# Patient Record
Sex: FEMALE | Race: WHITE | Employment: OTHER | URBAN - METROPOLITAN AREA
[De-identification: names, ages, dates, MRNs, and addresses within clinical notes are randomized per-mention and may not be internally consistent; named-entity substitution may affect disease eponyms.]

---

## 2017-01-18 RX ORDER — ALPRAZOLAM 1 MG/1
TABLET ORAL
Qty: 60 TAB | Refills: 3 | OUTPATIENT
Start: 2017-01-18 | End: 2017-05-23 | Stop reason: SDUPTHER

## 2017-01-18 RX ORDER — TRAMADOL HYDROCHLORIDE 50 MG/1
TABLET ORAL
Qty: 30 TAB | Refills: 1 | OUTPATIENT
Start: 2017-01-18 | End: 2017-11-13 | Stop reason: SDUPTHER

## 2017-02-07 RX ORDER — LEVOTHYROXINE SODIUM 75 UG/1
TABLET ORAL
Qty: 30 TAB | Refills: 11 | Status: SHIPPED | OUTPATIENT
Start: 2017-02-07 | End: 2017-11-13

## 2017-05-12 ENCOUNTER — OFFICE VISIT (OUTPATIENT)
Dept: FAMILY MEDICINE CLINIC | Age: 67
End: 2017-05-12

## 2017-05-12 ENCOUNTER — HOSPITAL ENCOUNTER (OUTPATIENT)
Dept: MAMMOGRAPHY | Age: 67
Discharge: HOME | End: 2017-05-12
Attending: FAMILY MEDICINE
Payer: COMMERCIAL

## 2017-05-12 VITALS
WEIGHT: 119.4 LBS | RESPIRATION RATE: 16 BRPM | DIASTOLIC BLOOD PRESSURE: 80 MMHG | TEMPERATURE: 99 F | BODY MASS INDEX: 22.54 KG/M2 | SYSTOLIC BLOOD PRESSURE: 148 MMHG | HEIGHT: 61 IN | OXYGEN SATURATION: 97 % | HEART RATE: 75 BPM

## 2017-05-12 DIAGNOSIS — N30.00 ACUTE CYSTITIS WITHOUT HEMATURIA: ICD-10-CM

## 2017-05-12 DIAGNOSIS — E03.4 HYPOTHYROIDISM DUE TO ACQUIRED ATROPHY OF THYROID: ICD-10-CM

## 2017-05-12 DIAGNOSIS — Z13.1 SCREENING FOR DIABETES MELLITUS: ICD-10-CM

## 2017-05-12 DIAGNOSIS — Z91.09 ENVIRONMENTAL ALLERGIES: ICD-10-CM

## 2017-05-12 DIAGNOSIS — F41.9 ANXIETY: ICD-10-CM

## 2017-05-12 DIAGNOSIS — Z51.81 ENCOUNTER FOR MEDICATION MONITORING: ICD-10-CM

## 2017-05-12 DIAGNOSIS — Z12.31 VISIT FOR SCREENING MAMMOGRAM: ICD-10-CM

## 2017-05-12 DIAGNOSIS — Z00.00 ROUTINE GENERAL MEDICAL EXAMINATION AT A HEALTH CARE FACILITY: Primary | ICD-10-CM

## 2017-05-12 LAB
BILIRUB UR QL STRIP: NEGATIVE
GLUCOSE UR-MCNC: NEGATIVE MG/DL
HBA1C MFR BLD HPLC: 5.1 %
HEMOCCULT STL QL: NEGATIVE
KETONES P FAST UR STRIP-MCNC: NORMAL MG/DL
PH UR STRIP: 5.5 [PH] (ref 4.6–8)
PROT UR QL STRIP: NORMAL MG/DL
SP GR UR STRIP: 1.02 (ref 1–1.03)
UA UROBILINOGEN AMB POC: NORMAL (ref 0.2–1)
URINALYSIS CLARITY POC: NORMAL
URINALYSIS COLOR POC: YELLOW
URINE BLOOD POC: NORMAL
URINE LEUKOCYTES POC: NORMAL
URINE NITRITES POC: POSITIVE
VALID INTERNAL CONTROL?: YES

## 2017-05-12 PROCEDURE — 77067 SCR MAMMO BI INCL CAD: CPT

## 2017-05-12 RX ORDER — SULFAMETHOXAZOLE AND TRIMETHOPRIM 800; 160 MG/1; MG/1
1 TABLET ORAL 2 TIMES DAILY
Qty: 10 TAB | Refills: 0 | Status: SHIPPED | OUTPATIENT
Start: 2017-05-12 | End: 2017-05-17

## 2017-05-12 NOTE — PROGRESS NOTES
Subjective:   79 y.o. female for Well Woman Check. Patient's last menstrual period was 01/01/1983. Hypercholesterolemia follow up:  Compliant w/ low fat, low cholesterol diet. Exercising some. Patient fasting today. Thyroid Review:  Patient is seen for followup of hypothyroidism. Thyroid ROS: denies fatigue, heat/cold intolerance, bowel/skin changes or CVS symptoms. Weight is up from last visit. Wt Readings from Last 3 Encounters:   05/12/17 119 lb 6.4 oz (54.2 kg)   06/06/16 114 lb (51.7 kg)   12/11/15 104 lb 6.4 oz (47.4 kg)     Patient Active Problem List   Diagnosis Code    Menopause Z78.0    Hypothyroidism E03.9    Anxiety F41.9    GERD (gastroesophageal reflux disease) K21.9    Lung nodule R91.1    Degenerative arthritis of thumb M19.049       Current Outpatient Prescriptions   Medication Sig Dispense Refill    calcium-cholecalciferol, d3, (CALCIUM 600 + D) 600-125 mg-unit tab Take 1 Tab by mouth daily.  Vit A,C,E-Zinc-Copper (ICAPS AREDS) cap capsule Take 1 Cap by mouth.  Cetirizine (ZYRTEC) 10 mg cap Take 1 Tab by mouth daily.  trimethoprim-sulfamethoxazole (BACTRIM DS, SEPTRA DS) 160-800 mg per tablet Take 1 Tab by mouth two (2) times a day for 5 days. 10 Tab 0    levothyroxine (SYNTHROID) 75 mcg tablet TAKE ONE TABLET BY MOUTH ONE TIME DAILY 30 Tab 11    traMADol (ULTRAM) 50 mg tablet Take 1 tablet by mouth every 6 hours as needed for pain. (Max 4 tablets per day) 30 Tab 1    ALPRAZolam (XANAX) 1 mg tablet TAKE ONE OR TWO TABLETS BY MOUTH TWICE DAILY AS NEEDED for anxiety 60 Tab 3    promethazine-dextromethorphan (PROMETHAZINE-DM) 6.25-15 mg/5 mL syrup Take 5 ml by mouth every 6 hours as needed for cough 240 mL 1    omeprazole (PRILOSEC) 20 mg capsule TAKE ONE CAPSULE BY MOUTH ONE TIME DAILY 90 Cap 3    black cohosh 540 mg cap Take 1 Tab by mouth daily.       fluticasone (FLONASE) 50 mcg/actuation nasal spray USE TWO SPRAYS IN EACH NOSTRIL DAILY  16 g 10 Allergies   Allergen Reactions    Codeine Nausea and Vomiting    Mepivacaine Other (comments)     Mepivacaine 3%   Facial swelling    Novocain [Procaine] Swelling     Facial swelling       Past Medical History:   Diagnosis Date    Anxiety 4/15/2010    Environmental allergies     GERD (gastroesophageal reflux disease)     Hypothyroidism 4/15/2010    Lung nodule     Menopause 4/15/2010    Pneumonia     Thyroid disease        Past Surgical History:   Procedure Laterality Date    HX APPENDECTOMY      HX HYSTERECTOMY  1983    MT COLONOSCOPY FLX DX W/COLLJ SPEC WHEN PFRMD  8/4/2010    nawaf       Family History   Problem Relation Age of Onset    Lung Disease Mother      emphysema    Hypertension Mother     Cancer Father      colon     Alzheimer Brother        Social History   Substance Use Topics    Smoking status: Former Smoker     Quit date: 1/1/2000    Smokeless tobacco: Never Used    Alcohol use 0.0 oz/week     0 Standard drinks or equivalent per week      Comment: occasional          ROS:  Feeling well. No dyspnea or chest pain on exertion. No abdominal pain, change in bowel habits, black or bloody stools. No urinary tract symptoms. GYN ROS: no breast pain or new or enlarging lumps on self exam, no discharge or pelvic pain, no hot flashes. No neurological complaints. Objective:     Visit Vitals    /80    Pulse 75    Temp 99 °F (37.2 °C) (Oral)    Resp 16    Ht 5' 1\" (1.549 m)    Wt 119 lb 6.4 oz (54.2 kg)    LMP 01/01/1983    SpO2 97%    BMI 22.56 kg/m2     The patient appears well, alert, oriented x 3, in no distress. ENT normal.  Neck supple. No adenopathy or thyromegaly. WILBERT. Lungs are clear, good air entry, no wheezes, rhonchi or rales. S1 and S2 normal, no murmurs, regular rate and rhythm. Abdomen soft without tenderness, guarding, mass or organomegaly. Extremities show no edema, normal peripheral pulses. Neurological is normal, no focal findings.     BREAST EXAM: breasts appear normal, no suspicious masses, no skin or nipple changes or axillary nodes    PELVIC EXAM: RECTAL: normal rectal, no masses, guaiac negative stool obtained    Assessment/Plan:   well woman  mammogram  counseled on breast self exam, mammography screening, osteoporosis and adequate intake of calcium and vitamin D  additional lab tests per orders  Emma Phillips was seen today for complete physical.    Diagnoses and all orders for this visit:    Routine general medical examination at a health care facility  -     LIPID PANEL  -     CBC W/O DIFF  -     AMB POC URINALYSIS DIP STICK AUTO W/ MICRO  -     AMB POC EKG ROUTINE W/ 12 LEADS, INTER & REP  -   Nonspecific ST changes non diagnostic. Asymptomatic for cardiac sx. -     AMB POC FECAL BLOOD, OCCULT, QL 1 CARD    Hypothyroidism due to acquired atrophy of thyroid  -     TSH 3RD GENERATION    Anxiety  Stable     Environmental allergies  Stable with OTC medication    Encounter for medication monitoring  -     METABOLIC PANEL, COMPREHENSIVE    Screening for diabetes mellitus  -     AMB POC HEMOGLOBIN A1C    Acute cystitis without hematuria  UA with 4+ bacteria. Does admit to urinary discomfort with passing her urine. Has a pulling sensation with urination. -     trimethoprim-sulfamethoxazole (BACTRIM DS, SEPTRA DS) 160-800 mg per tablet; Take 1 Tab by mouth two (2) times a day for 5 days. -     CULTURE, URINE        Follow-up Disposition:  Return in about 6 months (around 11/12/2017). reviewed diet, exercise and weight control  cardiovascular risk and specific lipid/LDL goals reviewed  reviewed medications and side effects in detail    I have discussed diagnosis listed in this note with pt and/or family. I have discussed treatment plans and options and the risk/benefit analysis of those options, including safe use of medications and possible medication side effects. Through the use of shared decision making we have agreed to the above plan.  The patient has received an after-visit summary and questions were answered concerning future plans and follow up. Advise pt of any urgent changes then to proceed to the ER.

## 2017-05-12 NOTE — PROGRESS NOTES
Patient is here for her yearly physical. She does not yet have Medicare.     CMP 12/11/2015    BMP 8/10/15    TSH/T4 6/6/16    Lipid 8/10/2015    Mammogram: 8/10/2015---appt today    Colonoscopy 3/12/2015 by Dr. Anna Query in 5 yrs

## 2017-05-12 NOTE — MR AVS SNAPSHOT
Visit Information Date & Time Provider Department Dept. Phone Encounter #  
 5/12/2017 10:15 AM Wilfredo Swenson MD [facility] [phone] [id number] Follow-up Instructions Return in about 6 months (around 11/12/2017). Your Appointments 11/13/2017  9:15 AM  
COMPLETE PHYSICAL with Wilfredo Swenson MD  
[facility] [address]) Appt Note: [facility]  
 [address] [place] [id number] [phone] [address] [facility] [address] Upcoming Health Maintenance Date Due ZOSTER VACCINE AGE 60> 1/28/2010 BREAST CANCER SCRN MAMMOGRAM 8/10/2017 INFLUENZA AGE 9 TO ADULT 8/1/2017 Pneumococcal 65+ Low/Medium Risk (2 of 2 - PPSV23) 12/11/2017 MEDICARE YEARLY EXAM 5/13/2018 GLAUCOMA SCREENING Q2Y 3/31/2019 COLONOSCOPY 3/12/2020 DTaP/Tdap/Td series (2 - Td) 4/19/2020 Allergies as of 5/12/2017  Review Complete On: 5/12/2017 By: Wilfredo Swenson MD  
  
 Severity Noted Reaction Type Reactions Codeine  10/07/2012    Nausea and Vomiting Mepivacaine  04/01/2010    Other (comments) Mepivacaine 3% Facial swelling Novocain [Procaine]  10/18/2010    Swelling Facial swelling Current Immunizations  Reviewed on 5/12/2017 Name Date Influenza Vaccine 9/15/2015, 12/11/2012 Influenza Vaccine PF 10/18/2013 Influenza Vaccine Split 10/21/2011, 10/18/2010 Pneumococcal Conjugate (PCV-13) 8/10/2015 Pneumococcal Polysaccharide (PPSV-23) 12/11/2012 TDAP Vaccine 4/19/2010 Reviewed by Wilfredo Swenson MD on 5/12/2017 at 11:05 AM  
You Were Diagnosed With   
  
 Codes Comments Routine general medical examination at a health care facility    -  Primary ICD-10-CM: Z00.00 ICD-9-CM: V70.0 Hypothyroidism due to acquired atrophy of thyroid     ICD-10-CM: E03.4 ICD-9-CM: 244.8, 246.8 Anxiety     ICD-10-CM: F41.9 ICD-9-CM: 300.00 Environmental allergies     ICD-10-CM: Z91.09 
ICD-9-CM: V15.09 Encounter for medication monitoring     ICD-10-CM: Z51.81 
ICD-9-CM: V58.83 Screening for diabetes mellitus     ICD-10-CM: Z13.1 ICD-9-CM: V77.1 Acute cystitis without hematuria     ICD-10-CM: N30.00 ICD-9-CM: 595.0 Vitals BP Pulse Temp Resp Height(growth percentile) Weight(growth percentile) 148/80 75 99 °F (37.2 °C) (Oral) 16 5' 1\" (1.549 m) 119 lb 6.4 oz (54.2 kg) LMP SpO2 BMI OB Status Smoking Status 01/01/1983 97% 22.56 kg/m2 Hysterectomy Former Smoker Vitals History BMI and BSA Data Body Mass Index Body Surface Area  
 22.56 kg/m 2 1.53 m 2 Preferred Pharmacy Pharmacy Name Phone [place] [id number] [place] [facility] [place] [phone] Your Updated Medication List  
  
   
This list is accurate as of: 5/12/17  5:06 PM.  Always use your most recent med list.  
  
  
  
  
 ALPRAZolam 1 mg tablet Commonly known as:  XANAX  
TAKE ONE OR TWO TABLETS BY MOUTH TWICE DAILY AS NEEDED for anxiety  
  
 black cohosh 540 mg Cap Take 1 Tab by mouth daily. CALCIUM 600 + D 600-125 mg-unit Tab Generic drug:  calcium-cholecalciferol (d3) Take 1 Tab by mouth daily. fluticasone 50 mcg/actuation nasal spray Commonly known as:  FLONASE  
USE TWO SPRAYS IN EACH NOSTRIL DAILY ICAPS AREDS Cap capsule Generic drug:  Vit A,C,E-Zinc-Copper Take 1 Cap by mouth.  
  
 levothyroxine 75 mcg tablet Commonly known as:  SYNTHROID  
TAKE ONE TABLET BY MOUTH ONE TIME DAILY  
  
 omeprazole 20 mg capsule Commonly known as:  PRILOSEC  
TAKE ONE CAPSULE BY MOUTH ONE TIME DAILY promethazine-dextromethorphan 6.25-15 mg/5 mL syrup Commonly known as:  PROMETHAZINE-DM Take 5 ml by mouth every 6 hours as needed for cough  
  
 traMADol 50 mg tablet Commonly known as:  Jose Burris  
 Take 1 tablet by mouth every 6 hours as needed for pain. (Max 4 tablets per day)  
  
 trimethoprim-sulfamethoxazole 160-800 mg per tablet Commonly known as:  BACTRIM DS, SEPTRA DS Take 1 Tab by mouth two (2) times a day for 5 days. ZyrTEC 10 mg Cap Generic drug:  Cetirizine Take 1 Tab by mouth daily. Prescriptions Sent to Pharmacy Refills  
 trimethoprim-sulfamethoxazole (BACTRIM DS, SEPTRA DS) 160-800 mg per tablet 0 Sig: Take 1 Tab by mouth two (2) times a day for 5 days. Class: Normal  
 Pharmacy: [facility] [id number] [place] [facility]: [phone] Route: Oral  
  
We Performed the Following AMB POC EKG ROUTINE W/ 12 LEADS, INTER & REP [71709 CPT(R)] AMB POC FECAL BLOOD, OCCULT, QL 1 CARD [81772 CPT(R)] AMB POC HEMOGLOBIN A1C [12750 CPT(R)] AMB POC URINALYSIS DIP STICK AUTO W/ MICRO [10615 CPT(R)] CBC W/O DIFF [44979 CPT(R)] CULTURE, URINE Q7618723 CPT(R)] LIPID PANEL [57689 CPT(R)] METABOLIC PANEL, COMPREHENSIVE [16358 CPT(R)] TSH 3RD GENERATION [12292 CPT(R)] Follow-up Instructions Return in about 6 months (around 11/12/2017). Introducing [facility]! Harshal Garcia introduces [employer] patient portal. Now you can access parts of your medical record, email your doctor's office, and request medication refills online. 1. In your internet browser, go to [email or web address] 2. Click on the First Time User? Click Here link in the Sign In box. You will see the New Member Sign Up page. 3. Enter your [employer] Access Code exactly as it appears below. You will not need to use this code after youve completed the sign-up process. If you do not sign up before the expiration date, you must request a new code. · [employer] Access Code: [email or web address] Expires: 8/10/2017 11:48 AM 
 
4.  Enter the last four digits of your Social Security Number (xxxx) and Date of Birth (mm/dd/yyyy) as indicated and click Submit. You will be taken to the next sign-up page. 5. Create a Medical Envelope ID. This will be your Medical Envelope login ID and cannot be changed, so think of one that is secure and easy to remember. 6. Create a Medical Envelope password. You can change your password at any time. 7. Enter your Password Reset Question and Answer. This can be used at a later time if you forget your password. 8. Enter your e-mail address. You will receive e-mail notification when new information is available in [address] 9. Click Sign Up. You can now view and download portions of your medical record. 10. Click the Download Summary menu link to download a portable copy of your medical information. If you have questions, please visit the Frequently Asked Questions section of the [employer] website. Remember, [employer] is NOT to be used for urgent needs. For medical emergencies, dial 911. Now available from your iPhone and Android! Please provide this summary of care documentation to your next provider. Your primary care clinician is listed as Dinah Berrios. If you have any questions after today's visit, please call [phone].

## 2017-05-13 LAB
ALBUMIN SERPL-MCNC: 4.7 G/DL (ref 3.6–4.8)
ALBUMIN/GLOB SERPL: 2.2 {RATIO} (ref 1.2–2.2)
ALP SERPL-CCNC: 79 IU/L (ref 39–117)
ALT SERPL-CCNC: 9 IU/L (ref 0–32)
AST SERPL-CCNC: 12 IU/L (ref 0–40)
BILIRUB SERPL-MCNC: 0.7 MG/DL (ref 0–1.2)
BUN SERPL-MCNC: 12 MG/DL (ref 8–27)
BUN/CREAT SERPL: 16 (ref 12–28)
CALCIUM SERPL-MCNC: 9.6 MG/DL (ref 8.7–10.3)
CHLORIDE SERPL-SCNC: 96 MMOL/L (ref 96–106)
CHOLEST SERPL-MCNC: 198 MG/DL (ref 100–199)
CO2 SERPL-SCNC: 23 MMOL/L (ref 18–29)
CREAT SERPL-MCNC: 0.75 MG/DL (ref 0.57–1)
ERYTHROCYTE [DISTWIDTH] IN BLOOD BY AUTOMATED COUNT: 13.6 % (ref 12.3–15.4)
GLOBULIN SER CALC-MCNC: 2.1 G/DL (ref 1.5–4.5)
GLUCOSE SERPL-MCNC: 89 MG/DL (ref 65–99)
HCT VFR BLD AUTO: 39.2 % (ref 34–46.6)
HDLC SERPL-MCNC: 85 MG/DL
HGB BLD-MCNC: 13.4 G/DL (ref 11.1–15.9)
INTERPRETATION, 910389: NORMAL
LDLC SERPL CALC-MCNC: 101 MG/DL (ref 0–99)
MCH RBC QN AUTO: 29.8 PG (ref 26.6–33)
MCHC RBC AUTO-ENTMCNC: 34.2 G/DL (ref 31.5–35.7)
MCV RBC AUTO: 87 FL (ref 79–97)
PLATELET # BLD AUTO: 298 X10E3/UL (ref 150–379)
POTASSIUM SERPL-SCNC: 4.7 MMOL/L (ref 3.5–5.2)
PROT SERPL-MCNC: 6.8 G/DL (ref 6–8.5)
RBC # BLD AUTO: 4.49 X10E6/UL (ref 3.77–5.28)
SODIUM SERPL-SCNC: 139 MMOL/L (ref 134–144)
TRIGL SERPL-MCNC: 59 MG/DL (ref 0–149)
TSH SERPL DL<=0.005 MIU/L-ACNC: 0.28 UIU/ML (ref 0.45–4.5)
VLDLC SERPL CALC-MCNC: 12 MG/DL (ref 5–40)
WBC # BLD AUTO: 11.1 X10E3/UL (ref 3.4–10.8)

## 2017-05-15 LAB — BACTERIA UR CULT: ABNORMAL

## 2017-05-16 RX ORDER — OMEPRAZOLE 20 MG/1
CAPSULE, DELAYED RELEASE ORAL
Qty: 90 CAP | Refills: 3 | Status: SHIPPED | OUTPATIENT
Start: 2017-05-16 | End: 2018-05-18 | Stop reason: SDUPTHER

## 2017-05-23 RX ORDER — ALPRAZOLAM 1 MG/1
TABLET ORAL
Qty: 60 TAB | Refills: 3 | OUTPATIENT
Start: 2017-05-23 | End: 2017-09-15 | Stop reason: SDUPTHER

## 2017-07-05 ENCOUNTER — LAB ONLY (OUTPATIENT)
Dept: FAMILY MEDICINE CLINIC | Age: 67
End: 2017-07-05

## 2017-07-05 DIAGNOSIS — E03.4 HYPOTHYROIDISM DUE TO ACQUIRED ATROPHY OF THYROID: Primary | ICD-10-CM

## 2017-07-06 LAB — TSH SERPL DL<=0.005 MIU/L-ACNC: 3.78 UIU/ML (ref 0.45–4.5)

## 2017-08-23 RX ORDER — TRAMADOL HYDROCHLORIDE 50 MG/1
TABLET ORAL
Qty: 30 TAB | Refills: 1 | OUTPATIENT
Start: 2017-08-23

## 2017-08-23 NOTE — TELEPHONE ENCOUNTER
Called pt LM that an office visit is required to get refill on Tramadol. Appt has been scheduled for 9/1/2017 at 2:45pm. If unable to make appt please call back.

## 2017-08-24 ENCOUNTER — TELEPHONE (OUTPATIENT)
Dept: FAMILY MEDICINE CLINIC | Age: 67
End: 2017-08-24

## 2017-08-24 NOTE — TELEPHONE ENCOUNTER
Patient wants to let you know she cancelled her appt for 09/01/2017.   If any questions please give her a call @ [phone]

## 2017-09-15 RX ORDER — ALPRAZOLAM 1 MG/1
TABLET ORAL
Qty: 60 TAB | Refills: 3 | Status: SHIPPED | OUTPATIENT
Start: 2017-09-15 | End: 2018-01-10 | Stop reason: SDUPTHER

## 2017-11-13 ENCOUNTER — OFFICE VISIT (OUTPATIENT)
Dept: FAMILY MEDICINE CLINIC | Age: 67
End: 2017-11-13

## 2017-11-13 VITALS
HEIGHT: 61 IN | BODY MASS INDEX: 22.51 KG/M2 | TEMPERATURE: 95.8 F | SYSTOLIC BLOOD PRESSURE: 140 MMHG | OXYGEN SATURATION: 95 % | HEART RATE: 61 BPM | RESPIRATION RATE: 14 BRPM | WEIGHT: 119.2 LBS | DIASTOLIC BLOOD PRESSURE: 84 MMHG

## 2017-11-13 DIAGNOSIS — F41.9 ANXIETY: ICD-10-CM

## 2017-11-13 DIAGNOSIS — M18.11 OSTEOARTHRITIS OF RIGHT THUMB: ICD-10-CM

## 2017-11-13 DIAGNOSIS — E03.4 HYPOTHYROIDISM DUE TO ACQUIRED ATROPHY OF THYROID: Primary | ICD-10-CM

## 2017-11-13 DIAGNOSIS — G89.29 ENCOUNTER FOR CHRONIC PAIN MANAGEMENT: ICD-10-CM

## 2017-11-13 RX ORDER — TRAMADOL HYDROCHLORIDE 50 MG/1
TABLET ORAL
Qty: 30 TAB | Refills: 0 | Status: SHIPPED | OUTPATIENT
Start: 2017-11-13 | End: 2018-05-14 | Stop reason: SDUPTHER

## 2017-11-13 RX ORDER — NAPROXEN 500 MG/1
500 TABLET ORAL
Qty: 30 TAB | Refills: 1 | Status: SHIPPED | OUTPATIENT
Start: 2017-11-13 | End: 2018-01-02 | Stop reason: SDUPTHER

## 2017-11-13 RX ORDER — LEVOTHYROXINE SODIUM 75 UG/1
TABLET ORAL
COMMUNITY
End: 2018-03-06 | Stop reason: SDUPTHER

## 2017-11-13 NOTE — MR AVS SNAPSHOT
Visit Information Date & Time Provider Department Dept. Phone Encounter #  
 11/13/2017  9:15 AM Eve Dial MD [facility] [phone] [id number] Follow-up Instructions Return in about 6 months (around 5/13/2018) for physical.  
  
Your Appointments 12/1/2017  8:30 AM  
ROUTINE CARE with Eve Dial MD  
[facility] [address]) Appt Note: f/u  jacob clay scheduled $25cp lsp 8/4/17  
 [address] [address] [phone] [address] [facility] [address] Upcoming Health Maintenance Date Due ZOSTER VACCINE AGE 60> 11/28/2009 Pneumococcal 65+ Low/Medium Risk (2 of 2 - PPSV23) 12/11/2017 MEDICARE YEARLY EXAM 5/13/2018 GLAUCOMA SCREENING Q2Y 3/31/2019 BREAST CANCER SCRN MAMMOGRAM 5/12/2019 COLONOSCOPY 3/12/2020 DTaP/Tdap/Td series (2 - Td) 4/19/2020 Allergies as of 11/13/2017  Review Complete On: 11/13/2017 By: Susan Skaggs LPN Severity Noted Reaction Type Reactions Codeine  10/07/2012    Nausea and Vomiting Mepivacaine  04/01/2010    Other (comments) Mepivacaine 3% Facial swelling Novocain [Procaine]  10/18/2010    Swelling Facial swelling Current Immunizations  Reviewed on 11/13/2017 Name Date Influenza Vaccine 8/8/2017, 9/15/2015, 12/11/2012 Influenza Vaccine PF 10/18/2013 Influenza Vaccine Split 10/21/2011, 10/18/2010 Pneumococcal Conjugate (PCV-13) 8/10/2015 Pneumococcal Polysaccharide (PPSV-23) 12/11/2012 TDAP Vaccine 4/19/2010 Reviewed by Eve Dial MD on 11/13/2017 at  9:24 AM  
You Were Diagnosed With   
  
 Codes Comments Hypothyroidism due to acquired atrophy of thyroid    -  Primary ICD-10-CM: E03.4 ICD-9-CM: 244.8, 246.8 Encounter for chronic pain management     ICD-10-CM: G89.29 ICD-9-CM: V65.49 Osteoarthritis of right thumb     ICD-10-CM: M18.11 ICD-9-CM: 715.34 Vitals BP Pulse Temp Resp Height(growth percentile) Weight(growth percentile) 140/84 61 95.8 °F (35.4 °C) (Oral) 14 5' 1\" (1.549 m) 119 lb 3.2 oz (54.1 kg) LMP SpO2 BMI OB Status Smoking Status 01/01/1983 95% 22.52 kg/m2 Hysterectomy Former Smoker Vitals History BMI and BSA Data Body Mass Index Body Surface Area  
 22.52 kg/m 2 1.53 m 2 Preferred Pharmacy Pharmacy Name Phone [facility] [address] [place] [place] [place] [phone] Your Updated Medication List  
  
   
This list is accurate as of: 11/13/17  9:41 AM.  Always use your most recent med list.  
  
  
  
  
 ALPRAZolam 1 mg tablet Commonly known as:  XANAX  
TAKE ONE OR TWO TABLETS BY MOUTH TWICE DAILY AS NEEDED for anxiety  
  
 black cohosh 540 mg Cap Take 1 Tab by mouth daily. CALCIUM 600 + D 600-125 mg-unit Tab Generic drug:  calcium-cholecalciferol (d3) Take 1 Tab by mouth daily. fluticasone 50 mcg/actuation nasal spray Commonly known as:  FLONASE  
USE TWO SPRAYS IN EACH NOSTRIL DAILY ICAPS AREDS Cap capsule Generic drug:  Vit A,C,E-Zinc-Copper Take 1 Cap by mouth.  
  
 levothyroxine 75 mcg tablet Commonly known as:  SYNTHROID Take 1/2  tab by mouth every Saturday and then take 1 tab all other days. naproxen 500 mg tablet Commonly known as:  NAPROSYN Take 1 Tab by mouth two (2) times daily as needed. omeprazole 20 mg capsule Commonly known as:  PRILOSEC  
TAKE ONE CAPSULE BY MOUTH ONE TIME DAILY  
  
 traMADol 50 mg tablet Commonly known as:  ULTRAM  
Take 1 tablet by mouth every 6 hours as needed for pain. (Max 4 tablets per day) ZyrTEC 10 mg Cap Generic drug:  Cetirizine Take 1 Tab by mouth daily. Prescriptions Printed  Refills  
 traMADol (ULTRAM) 50 mg tablet 0  
 Sig: Take 1 tablet by mouth every 6 hours as needed for pain. (Max 4 tablets per day) Class: Print Prescriptions Sent to Pharmacy Refills  
 naproxen (NAPROSYN) 500 mg tablet 1 Sig: Take 1 Tab by mouth two (2) times daily as needed. Class: Normal  
 Pharmacy: [facility] [id number] [place]  #: [phone] Route: Oral  
  
We Performed the Following 9-DRUG SCREEN + OXY, UR [id number] CPT(R)] [address] MONITORING [[id number] Custom] TSH 3RD GENERATION [87118 CPT(R)] Follow-up Instructions Return in about 6 months (around 5/13/2018) for physical.  
  
  
Introducing [facility]! Romayne Duster introduces [employer] patient portal. Now you can access parts of your medical record, email your doctor's office, and request medication refills online. 1. In your internet browser, go to [email or web address] Site 2. Click on the First Time User? Click Here link in the Sign In box. You will see the New Member Sign Up page. 3. Enter your [employer] Access Code exactly as it appears below. You will not need to use this code after youve completed the sign-up process. If you do not sign up before the expiration date, you must request a new code. · [employer] Access Code: [email or web address] Expires: 2/11/2018  8:39 AM 
 
4. Enter the last four digits of your Social Security Number (xxxx) and Date of Birth (mm/dd/yyyy) as indicated and click Submit. You will be taken to the next sign-up page. 5. Create a Secure [employer] ID. This will be your [employer] login ID and cannot be changed, so think of one that is secure and easy to remember. 6. Create a [employer] password. You can change your password at any time. 7. Enter your Password Reset Question and Answer. This can be used at a later time if you forget your password. 8. Enter your e-mail address. You will receive e-mail notification when new information is available in [address] 9. Click Sign Up. You can now view and download portions of your medical record. 10. Click the Download Summary menu link to download a portable copy of your medical information. If you have questions, please visit the Frequently Asked Questions section of the [employer] website. Remember, [employer] is NOT to be used for urgent needs. For medical emergencies, dial 911. Now available from your iPhone and Android! Please provide this summary of care documentation to your next provider. Your primary care clinician is listed as Deena Guevara. If you have any questions after today's visit, please call [phone].

## 2017-11-13 NOTE — PROGRESS NOTES
HISTORY OF PRESENT ILLNESS  Glo Arciniega is a 79 y.o. female. HPI   For follow up. Overall feeling well. Hypercholesterolemia follow up:  Compliant w/ low fat, low cholesterol diet. Exercising some. Patient fasting today. Thyroid Review:  Patient is seen for followup of hypothyroidism. Thyroid ROS: denies fatigue, heat/cold intolerance, bowel/skin changes or CVS symptoms. Weight loss as above. Encounter for pain management. 1./2. Medical history/Past medical History  Chronic Pain:  C/o pain in the right right thumb from arthritits. Aches and has increased pain with trying to  her grandchildren. Increase pain in the the thumb is so several t times. Using the tramadol for severe pain. 3. Applicable records from prior treatment providers are apart of [facility] under the media tab. 4. Diagnostic, therapeutic and laboratory results are available in the [facility] chart. 5. Consultation notes are available for review in the media tab of the [facility] chart. 6. Treatment goals include pain control so that the pt may be as active and function with her daily activities and improved comfort level. Previously pt has been limited by pain. 7. The risks and benefits of treatment has been discussed at this office visit with the pt. She understands that the medication has addicting potential.  Additionally the pt has been advised that narcotic pain medication may impair mental and/or physical ability required for performance of tasks such as driving or operating any other machinery. 8. Pt has an updated signed pain contract on file and can be found under the FYI section of the [facility] chart. 9. The pain contract is reviewed. Pain medication will be continued at the previous dosage. Urine drug screening will be done today. Diagnostic studies are not indicated at this time. Interventional procedure are not indicated at this time. 10. Medication prescibed is .    has been reviewed at this OV by me. 11. Patient instructions have been reviewed in detail as outlined above and in the pain contract. 12. Re-eval is planned for 3 months. Patient Active Problem List   Diagnosis Code    Menopause Z78.0    Hypothyroidism E03.9    Anxiety F41.9    GERD (gastroesophageal reflux disease) K21.9    Lung nodule R91.1    Degenerative arthritis of thumb M18.10       Current Outpatient Prescriptions   Medication Sig Dispense Refill    traMADol (ULTRAM) 50 mg tablet Take 1 tablet by mouth every 6 hours as needed for pain. (Max 4 tablets per day) 30 Tab 0    naproxen (NAPROSYN) 500 mg tablet Take 1 Tab by mouth two (2) times daily as needed. 30 Tab 1    levothyroxine (SYNTHROID) 75 mcg tablet Take 1/2  tab by mouth every Saturday and then take 1 tab all other days.  ALPRAZolam (XANAX) 1 mg tablet TAKE ONE OR TWO TABLETS BY MOUTH TWICE DAILY AS NEEDED for anxiety 60 Tab 3    omeprazole (PRILOSEC) 20 mg capsule TAKE ONE CAPSULE BY MOUTH ONE TIME DAILY 90 Cap 3    calcium-cholecalciferol, d3, (CALCIUM 600 + D) 600-125 mg-unit tab Take 1 Tab by mouth daily.  Vit A,C,E-Zinc-Copper (ICAPS AREDS) cap capsule Take 1 Cap by mouth.  Cetirizine (ZYRTEC) 10 mg cap Take 1 Tab by mouth daily.  black cohosh 540 mg cap Take 1 Tab by mouth daily.       fluticasone (FLONASE) 50 mcg/actuation nasal spray USE TWO SPRAYS IN EACH NOSTRIL DAILY  16 g 10       Allergies   Allergen Reactions    Codeine Nausea and Vomiting    Mepivacaine Other (comments)     Mepivacaine 3%   Facial swelling    Novocain [Procaine] Swelling     Facial swelling         Past Medical History:   Diagnosis Date    Anxiety 4/15/2010    Environmental allergies     GERD (gastroesophageal reflux disease)     Hypothyroidism 4/15/2010    Lung nodule     Menopause 4/15/2010    Pneumonia     Thyroid disease          Past Surgical History:   Procedure Laterality Date    HX APPENDECTOMY      HX HYSTERECTOMY 1983    MS COLONOSCOPY FLX DX W/COLLJ SPEC WHEN PFRMD  8/4/2010    nawaf         Family History   Problem Relation Age of Onset    Lung Disease Mother      emphysema    Hypertension Mother     Cancer Father      colon     Alzheimer Brother        Social History   Substance Use Topics    Smoking status: Former Smoker     Quit date: 1/1/2000    Smokeless tobacco: Never Used    Alcohol use 0.0 oz/week     0 Standard drinks or equivalent per week      Comment: occasional        Lab Results   Component Value Date/Time    WBC 11.1 05/12/2017 11:00 AM    HGB 13.4 05/12/2017 11:00 AM    HCT 39.2 05/12/2017 11:00 AM    PLATELET 326 37/23/2458 11:00 AM    MCV 87 05/12/2017 11:00 AM       Lab Results   Component Value Date/Time    Cholesterol, total 198 05/12/2017 11:00 AM    HDL Cholesterol 85 05/12/2017 11:00 AM    LDL, calculated 101 05/12/2017 11:00 AM    Triglyceride 59 05/12/2017 11:00 AM    CHOL/HDL Ratio 2.3 02/17/2009 04:08 PM       Lab Results   Component Value Date/Time    TSH 3.780 07/05/2017 09:00 AM    T4, Free 1.56 12/11/2015 08:13 AM      Lab Results   Component Value Date/Time    Sodium 139 05/12/2017 11:00 AM    Potassium 4.7 05/12/2017 11:00 AM    Chloride 96 05/12/2017 11:00 AM    CO2 23 05/12/2017 11:00 AM    Anion gap 6 10/07/2012 11:30 AM    Glucose 89 05/12/2017 11:00 AM    BUN 12 05/12/2017 11:00 AM    Creatinine 0.75 05/12/2017 11:00 AM    BUN/Creatinine ratio 16 05/12/2017 11:00 AM    GFR est AA 95 05/12/2017 11:00 AM    GFR est non-AA 83 05/12/2017 11:00 AM    Calcium 9.6 05/12/2017 11:00 AM    Bilirubin, total 0.7 05/12/2017 11:00 AM    ALT (SGPT) 9 05/12/2017 11:00 AM    AST (SGOT) 12 05/12/2017 11:00 AM    Alk.  phosphatase 79 05/12/2017 11:00 AM    Protein, total 6.8 05/12/2017 11:00 AM    Albumin 4.7 05/12/2017 11:00 AM    Globulin 3.7 10/07/2012 11:30 AM    A-G Ratio 2.2 05/12/2017 11:00 AM      Lab Results   Component Value Date/Time    Hemoglobin A1c 5.3 04/20/2012 10:34 AM Hemoglobin A1c (POC) 5.1 05/12/2017 10:05 AM         Review of Systems   Constitutional: Negative for malaise/fatigue. HENT: Negative for congestion. Eyes: Negative for blurred vision. Respiratory: Negative for cough and shortness of breath. Cardiovascular: Negative for chest pain, palpitations and leg swelling. Gastrointestinal: Negative for heartburn, abdominal pain and constipation. Genitourinary: Negative for dysuria, urgency and frequency. Musculoskeletal: Negative for back pain. Neurological: Negative for dizziness, tingling and headaches. Endo/Heme/Allergies: Negative for environmental allergies. Psychiatric/Behavioral: Negative for depression. The patient does not have insomnia. Physical Exam   Constitutional: She appears well-developed and well-nourished. /84  Pulse 61  Temp 95.8 °F (35.4 °C) (Oral)   Resp 14  Ht 5' 1\" (1.549 m)  Wt 119 lb 3.2 oz (54.1 kg)  LMP 01/01/1983  SpO2 95%  BMI 22.52 kg/m2  HENT:   Right Ear: Tympanic membrane and ear canal normal.   Left Ear: Tympanic membrane and ear canal normal.   Nose: No mucosal edema or rhinorrhea. Mouth/Throat: Oropharynx is clear and moist and mucous membranes are normal.   Neck: Normal range of motion. Neck supple. No thyromegaly present. Cardiovascular: Normal rate and regular rhythm. No murmur heard. Pulmonary/Chest: Effort normal and breath sounds normal.   Abdominal: Soft. Bowel sounds are normal. There is no tenderness. Musculoskeletal: Normal range of motion. She exhibits no edema. Lymphadenopathy:     She has no cervical adenopathy. Skin: Skin is warm and dry. Psychiatric: She has a normal mood and affect. Nursing note and vitals reviewed. ASSESSMENT and PLAN  Diagnoses and all orders for this visit:    1. Hypothyroidism due to acquired atrophy of thyroid  -     TSH 3RD GENERATION    2. Osteoarthritis of right thumb  -     traMADol (ULTRAM) 50 mg tablet;  Take 1 tablet by mouth every 6 hours as needed for pain. (Max 4 tablets per day)  -     naproxen (NAPROSYN) 500 mg tablet; Take 1 Tab by mouth two (2) times daily as needed. 3. Anxiety  Stable on Xanax    4. Encounter for chronic pain management  -     9-DRUG SCREEN + OXY, UR  -     COMPLIANCE DRUG SCREEN/PRESCRIPTION MONITORING  The pt has signed medication agreement. Pain contract is reviewed. Pain medications will be continued at the previous dosage. Urine drug screening will be done today. Diagnostic  studies are not indicated at this time. Interventional procedure are not indicated at this time. Re-eval in 3 months. Follow-up Disposition:  Return in about 6 months (around 5/13/2018) for physical.  reviewed diet, exercise and weight control  cardiovascular risk and specific lipid/LDL goals reviewed  reviewed medications and side effects in detail    I have discussed diagnosis listed in this note with pt and/or family. I have discussed treatment plans and options and the risk/benefit analysis of those options, including safe use of medications and possible medication side effects. Through the use of shared decision making we have agreed to the above plan. The patient has received an after-visit summary and questions were answered concerning future plans and follow up. Advise pt of any urgent changes then to proceed to the ER.

## 2017-11-13 NOTE — PROGRESS NOTES
Chief Complaint   Patient presents with    Thyroid Problem           CMP 5/12/17  TSH 7/5/17  Lipid 5/12/17  A1C 5/12/17  Mammogram 5/12/17  Bone Density 4/8/13  Colonoscopy  3/12/15 Dr Lazaro Lay 5yr recommendation  Eye exam  Pt states July 2017 Dr Mraia    1. Have you been to the ER, urgent care clinic since your last visit? Hospitalized since your last visit? No    2. Have you seen or consulted any other health care providers outside of the [address] [place] [address] since your last visit? Include any pap smears or colon screening.  No

## 2017-11-14 LAB — TSH SERPL DL<=0.005 MIU/L-ACNC: 2.84 UIU/ML (ref 0.45–4.5)

## 2017-11-15 LAB
AMPHETAMINES UR QL SCN: NEGATIVE NG/ML
BARBITURATES UR QL SCN: NEGATIVE NG/ML
BENZODIAZ UR QL SCN: POSITIVE NG/ML
BZE UR QL SCN: NEGATIVE NG/ML
CANNABINOIDS UR QL SCN: NEGATIVE NG/ML
CREAT UR-MCNC: 19.9 MG/DL (ref 20–300)
METHADONE UR QL SCN: NEGATIVE NG/ML
OPIATES UR QL SCN: NEGATIVE NG/ML
OXYCODONE+OXYMORPHONE UR QL SCN: NEGATIVE NG/ML
PCP UR QL SCN: NEGATIVE NG/ML
PH UR: 5.6 [PH] (ref 4.5–8.9)
PLEASE NOTE:, 733163: ABNORMAL
PROPOXYPH UR QL SCN: NEGATIVE NG/ML
SP GR UR: 1

## 2017-11-20 LAB — DRUGS UR: NORMAL

## 2017-11-27 ENCOUNTER — TELEPHONE (OUTPATIENT)
Dept: FAMILY MEDICINE CLINIC | Age: 67
End: 2017-11-27

## 2017-11-27 NOTE — TELEPHONE ENCOUNTER
Patients spouse walked in requesting an order to be placed for his wife for her to to get her bone density test and mammogram scheduled  on May 13, 2018 at [facility]. Please fax copy of order to office along with mailing a copy to home.

## 2018-01-02 DIAGNOSIS — M18.11 OSTEOARTHRITIS OF RIGHT THUMB: ICD-10-CM

## 2018-01-02 RX ORDER — NAPROXEN 500 MG/1
500 TABLET ORAL
Qty: 30 TAB | Refills: 1 | Status: SHIPPED | OUTPATIENT
Start: 2018-01-02 | End: 2018-09-05

## 2018-01-02 NOTE — TELEPHONE ENCOUNTER
Requested Prescriptions     Pending Prescriptions Disp Refills    naproxen (NAPROSYN) 500 mg tablet 30 Tab 1     Sig: Take 1 Tab by mouth two (2) times daily as needed.

## 2018-01-10 DIAGNOSIS — F41.9 ANXIETY: Primary | ICD-10-CM

## 2018-01-10 RX ORDER — ALPRAZOLAM 1 MG/1
TABLET ORAL
Qty: 60 TAB | Refills: 3 | OUTPATIENT
Start: 2018-01-10 | End: 2018-05-15 | Stop reason: SDUPTHER

## 2018-03-06 ENCOUNTER — TELEPHONE (OUTPATIENT)
Dept: FAMILY MEDICINE CLINIC | Age: 68
End: 2018-03-06

## 2018-03-06 DIAGNOSIS — E03.4 HYPOTHYROIDISM DUE TO ACQUIRED ATROPHY OF THYROID: ICD-10-CM

## 2018-03-06 RX ORDER — LEVOTHYROXINE SODIUM 75 UG/1
75 TABLET ORAL
Qty: 30 TAB | Refills: 11 | Status: SHIPPED | OUTPATIENT
Start: 2018-03-06 | End: 2018-09-06 | Stop reason: SDUPTHER

## 2018-04-03 RX ORDER — AZITHROMYCIN 250 MG/1
TABLET, FILM COATED ORAL
Qty: 6 TAB | Refills: 0 | Status: SHIPPED | OUTPATIENT
Start: 2018-04-03 | End: 2018-04-08

## 2018-04-03 NOTE — TELEPHONE ENCOUNTER
Pt  has had sore throat, chest congestion and would like something called in for her.  Pt # [phone]

## 2018-04-03 NOTE — TELEPHONE ENCOUNTER
Patient c/o coughing up \"gray/yellow\" sputum, sore throat, and sinus drainage. Patient would like RX sent to the pharm. Informed patient will check with Dr. Brand. Patient verbalized understanding.

## 2018-05-10 ENCOUNTER — DOCUMENTATION ONLY (OUTPATIENT)
Dept: FAMILY MEDICINE CLINIC | Age: 68
End: 2018-05-10

## 2018-05-10 DIAGNOSIS — M81.0 OSTEOPOROSIS, UNSPECIFIED OSTEOPOROSIS TYPE, UNSPECIFIED PATHOLOGICAL FRACTURE PRESENCE: ICD-10-CM

## 2018-05-10 DIAGNOSIS — Z12.31 VISIT FOR SCREENING MAMMOGRAM: Primary | ICD-10-CM

## 2018-05-14 ENCOUNTER — HOSPITAL ENCOUNTER (OUTPATIENT)
Dept: MAMMOGRAPHY | Age: 68
Discharge: HOME | End: 2018-05-14
Attending: FAMILY MEDICINE
Payer: COMMERCIAL

## 2018-05-14 ENCOUNTER — HOSPITAL ENCOUNTER (OUTPATIENT)
Dept: BONE DENSITY | Age: 68
Discharge: HOME | End: 2018-05-14
Attending: FAMILY MEDICINE
Payer: COMMERCIAL

## 2018-05-14 ENCOUNTER — OFFICE VISIT (OUTPATIENT)
Dept: FAMILY MEDICINE CLINIC | Age: 68
End: 2018-05-14

## 2018-05-14 VITALS
BODY MASS INDEX: 22.84 KG/M2 | TEMPERATURE: 96.6 F | SYSTOLIC BLOOD PRESSURE: 136 MMHG | HEART RATE: 76 BPM | WEIGHT: 121 LBS | OXYGEN SATURATION: 97 % | DIASTOLIC BLOOD PRESSURE: 80 MMHG | RESPIRATION RATE: 16 BRPM | HEIGHT: 61 IN

## 2018-05-14 DIAGNOSIS — G89.29 ENCOUNTER FOR CHRONIC PAIN MANAGEMENT: ICD-10-CM

## 2018-05-14 DIAGNOSIS — E03.4 HYPOTHYROIDISM DUE TO ACQUIRED ATROPHY OF THYROID: Primary | ICD-10-CM

## 2018-05-14 DIAGNOSIS — M18.11 OSTEOARTHRITIS OF RIGHT THUMB: ICD-10-CM

## 2018-05-14 DIAGNOSIS — Z13.1 SCREENING FOR DIABETES MELLITUS: ICD-10-CM

## 2018-05-14 DIAGNOSIS — Z51.81 ENCOUNTER FOR MEDICATION MONITORING: ICD-10-CM

## 2018-05-14 DIAGNOSIS — M81.0 OSTEOPOROSIS, UNSPECIFIED OSTEOPOROSIS TYPE, UNSPECIFIED PATHOLOGICAL FRACTURE PRESENCE: ICD-10-CM

## 2018-05-14 DIAGNOSIS — Z12.31 VISIT FOR SCREENING MAMMOGRAM: ICD-10-CM

## 2018-05-14 DIAGNOSIS — Z13.220 LIPID SCREENING: ICD-10-CM

## 2018-05-14 DIAGNOSIS — Z91.09 ENVIRONMENTAL ALLERGIES: ICD-10-CM

## 2018-05-14 LAB — HBA1C MFR BLD HPLC: 4.9 %

## 2018-05-14 PROCEDURE — 77067 SCR MAMMO BI INCL CAD: CPT

## 2018-05-14 PROCEDURE — 77080 DXA BONE DENSITY AXIAL: CPT

## 2018-05-14 RX ORDER — ZOSTER VACCINE RECOMBINANT, ADJUVANTED 50 MCG/0.5
KIT INTRAMUSCULAR
COMMUNITY
Start: 2018-04-20 | End: 2018-05-14 | Stop reason: ALTCHOICE

## 2018-05-14 RX ORDER — TRAMADOL HYDROCHLORIDE 50 MG/1
TABLET ORAL
Qty: 30 TAB | Refills: 0 | Status: SHIPPED | OUTPATIENT
Start: 2018-05-14 | End: 2018-10-05 | Stop reason: SDUPTHER

## 2018-05-14 NOTE — PROGRESS NOTES
HISTORY OF PRESENT ILLNESS  Heath Cisneros is a 76 y.o. female. HPI   For follow up. Overall feeling well. Hypercholesterolemia follow up:  Compliant w/ low fat, low cholesterol diet. Exercising some. Patient fasting today. Thyroid Review:  Patient is seen for followup of hypothyroidism. Thyroid ROS: denies fatigue, heat/cold intolerance, bowel/skin changes or CVS symptoms. Weight loss as above. Encounter for pain management. 1./2. Medical history/Past medical History  Chronic Pain:  C/o pain in the right right thumb from arthritits. Aches and has increased pain with trying to  her grandchildren. Increase pain in the thumb is so severe at times. Using the tramadol for severe pain. Taking NSAIDs upsets her stomach. 3. Applicable records from prior treatment providers are apart of [facility] under the media tab. 4. Diagnostic, therapeutic and laboratory results are available in the [facility] chart. 5. Consultation notes are available for review in the media tab of the [facility] chart. 6. Treatment goals include pain control so that the pt may be as active and function with her daily activities and improved comfort level. Previously pt has been limited by pain. 7. The risks and benefits of treatment has been discussed at this office visit with the pt. She understands that the medication has addicting potential.  Additionally the pt has been advised that narcotic pain medication may impair mental and/or physical ability required for performance of tasks such as driving or operating any other machinery. 8. Pt has an updated signed pain contract on file and can be found under the FYI section of the [facility] chart. 9. The pain contract is reviewed. Pain medication will be continued at the previous dosage. Urine drug screening will be done today. Diagnostic studies are not indicated at this time. Interventional procedure are not indicated at this time.       10. Medication prescibed is traMADol (ULTRAM) 50 mg tablet.  has been reviewed at this OV by me. 11. Patient instructions have been reviewed in detail as outlined above and in the pain contract. 12. Re-eval is planned for 3 months. Patient Active Problem List   Diagnosis Code    Menopause Z78.0    Hypothyroidism E03.9    Anxiety F41.9    GERD (gastroesophageal reflux disease) K21.9    Lung nodule R91.1    Degenerative arthritis of thumb M18.10       Current Outpatient Prescriptions   Medication Sig Dispense Refill    multivit-min/iron/folic/lutein (CENTRUM SILVER WOMEN PO) Take 1 Tab by mouth daily.  loratadine-pseudoephedrine (CLARITIN-D 12 HOUR) 5-120 mg per tablet Take 1 Tab by mouth two (2) times daily as needed.  traMADol (ULTRAM) 50 mg tablet Take 1 tablet by mouth every 6 hours as needed for pain. (Max 4 tablets per day) 30 Tab 0    levothyroxine (SYNTHROID) 75 mcg tablet Take 1 Tab by mouth Daily (before breakfast). 30 Tab 11    ALPRAZolam (XANAX) 1 mg tablet TAKE ONE OR TWO TABLETS BY MOUTH TWICE DAILY AS NEEDED for anxiety 60 Tab 3    naproxen (NAPROSYN) 500 mg tablet Take 1 Tab by mouth two (2) times daily as needed. 30 Tab 1    omeprazole (PRILOSEC) 20 mg capsule TAKE ONE CAPSULE BY MOUTH ONE TIME DAILY 90 Cap 3    Vit A,C,E-Zinc-Copper (ICAPS AREDS) cap capsule Take 1 Cap by mouth.  Cetirizine (ZYRTEC) 10 mg cap Take 1 Tab by mouth daily as needed.  black cohosh 540 mg cap Take 1 Tab by mouth daily.       fluticasone (FLONASE) 50 mcg/actuation nasal spray USE TWO SPRAYS IN EACH NOSTRIL DAILY  16 g 10       Allergies   Allergen Reactions    Codeine Nausea and Vomiting    Mepivacaine Other (comments)     Mepivacaine 3%   Facial swelling    Novocain [Procaine] Swelling     Facial swelling       Past Medical History:   Diagnosis Date    Anxiety 4/15/2010    Environmental allergies     GERD (gastroesophageal reflux disease)     Hypothyroidism 4/15/2010    Lung nodule     Menopause 4/15/2010    Pneumonia     Thyroid disease        Past Surgical History:   Procedure Laterality Date    HX APPENDECTOMY      HX HYSTERECTOMY  1983    VT COLONOSCOPY FLX DX W/COLLJ SPEC WHEN PFRMD  8/4/2010    nawaf       Family History   Problem Relation Age of Onset    Lung Disease Mother      emphysema    Hypertension Mother     Cancer Father      colon     Alzheimer Brother        Social History   Substance Use Topics    Smoking status: Former Smoker     Quit date: 1/1/2000    Smokeless tobacco: Never Used    Alcohol use 0.0 oz/week     0 Standard drinks or equivalent per week      Comment: occasional     Lab Results  Component Value Date/Time   WBC 11.1 (H) 05/12/2017 11:00 AM   HGB 13.4 05/12/2017 11:00 AM   HCT 39.2 05/12/2017 11:00 AM   PLATELET 635 44/59/6745 11:00 AM   MCV 87 05/12/2017 11:00 AM     Lab Results  Component Value Date/Time   Cholesterol, total 198 05/12/2017 11:00 AM   HDL Cholesterol 85 05/12/2017 11:00 AM   LDL, calculated 101 (H) 05/12/2017 11:00 AM   Triglyceride 59 05/12/2017 11:00 AM   CHOL/HDL Ratio 2.3 02/17/2009 04:08 PM     Lab Results  Component Value Date/Time   TSH 2.840 11/13/2017 09:47 AM   T4, Free 1.56 12/11/2015 08:13 AM      Lab Results   Component Value Date/Time    Sodium 139 05/12/2017 11:00 AM    Potassium 4.7 05/12/2017 11:00 AM    Chloride 96 05/12/2017 11:00 AM    CO2 23 05/12/2017 11:00 AM    Anion gap 6 10/07/2012 11:30 AM    Glucose 89 05/12/2017 11:00 AM    BUN 12 05/12/2017 11:00 AM    Creatinine 0.75 05/12/2017 11:00 AM    BUN/Creatinine ratio 16 05/12/2017 11:00 AM    GFR est AA 95 05/12/2017 11:00 AM    GFR est non-AA 83 05/12/2017 11:00 AM    Calcium 9.6 05/12/2017 11:00 AM    Bilirubin, total 0.7 05/12/2017 11:00 AM    ALT (SGPT) 9 05/12/2017 11:00 AM    AST (SGOT) 12 05/12/2017 11:00 AM    Alk.  phosphatase 79 05/12/2017 11:00 AM    Protein, total 6.8 05/12/2017 11:00 AM    Albumin 4.7 05/12/2017 11:00 AM    Globulin 3.7 10/07/2012 11:30 AM    A-G Ratio 2.2 05/12/2017 11:00 AM      Lab Results   Component Value Date/Time    Hemoglobin A1c 5.3 04/20/2012 10:34 AM    Hemoglobin A1c (POC) 4.9 05/14/2018 10:15 AM       Review of Systems   Constitutional: Negative for malaise/fatigue. HENT: Negative for congestion. Eyes: Negative for blurred vision. Respiratory: Negative for cough and shortness of breath. Cardiovascular: Negative for chest pain, palpitations and leg swelling. Gastrointestinal: Negative for heartburn, abdominal pain and constipation. Genitourinary: Negative for dysuria, urgency and frequency. Musculoskeletal: Negative for back pain. Neurological: Negative for dizziness, tingling and headaches. Endo/Heme/Allergies: positive for environmental allergies. Psychiatric/Behavioral: Negative for depression. The patient does not have insomnia. Physical Exam   Constitutional: She appears well-developed and well-nourished. /80  Pulse 76  Temp 96.6 °F (35.9 °C) (Oral)   Resp 16  Ht 5' 1\" (1.549 m)  Wt 121 lb (54.9 kg)  LMP 01/01/1983  SpO2 97%  BMI 22.86 kg/m2    HENT:   Right Ear: Tympanic membrane and ear canal normal.   Left Ear: Tympanic membrane and ear canal normal.   Nose: No mucosal edema or rhinorrhea. Mouth/Throat: Oropharynx is clear and moist and mucous membranes are normal.   Neck: Normal range of motion. Neck supple. No thyromegaly present. Cardiovascular: Normal rate and regular rhythm. No murmur heard. Pulmonary/Chest: Effort normal and breath sounds normal.   Abdominal: Soft. Bowel sounds are normal. There is no tenderness. Musculoskeletal: Normal range of motion. She exhibits no edema. Tenderness in the right thumb at the MCP. Mild swelling noted. Lymphadenopathy:     She has no cervical adenopathy. Skin: Skin is warm and dry. Psychiatric: She has a normal mood and affect. Nursing note and vitals reviewed.     ASSESSMENT and PLAN  Diagnoses and all orders for this visit:    1. Hypothyroidism due to acquired atrophy of thyroid  -     TSH 3RD GENERATION    2. Environmental allergies  Stable with Claritin. Does have some cough at night and she can use mucinex for this. 3. Encounter for medication monitoring  -     METABOLIC PANEL, COMPREHENSIVE  -     CBC W/O DIFF    4. Screening for diabetes mellitus  -     AMB POC HEMOGLOBIN A1C    5. Lipid screening  -     LIPID PANEL    6. Osteoarthritis of right thumb  -     traMADol (ULTRAM) 50 mg tablet; Take 1 tablet by mouth every 6 hours as needed for pain. (Max 4 tablets per day)  Instructions for exercises given and reviewed with pt. Pt also to use heat to the area 3-4 times a day over the next several days until sx are improved. 7. Encounter for chronic pain management  -     9-DRUG SCREEN + OXY, UR  The pt has signed medication agreement. Pain contract is reviewed. Pain medications will be continued at the previous dosage. Urine drug screening will be done today. Diagnostic  studies are not indicated at this time. Interventional procedure are not indicated at this time. Re-eval in 3 months. Follow-up Disposition:  Return in about 4 months (around 9/14/2018) for physical.  reviewed diet, exercise and weight control  cardiovascular risk and specific lipid/LDL goals reviewed  reviewed medications and side effects in detail    I have discussed diagnosis listed in this note with pt and/or family. I have discussed treatment plans and options and the risk/benefit analysis of those options, including safe use of medications and possible medication side effects. Through the use of shared decision making we have agreed to the above plan. The patient has received an after-visit summary and questions were answered concerning future plans and follow up. Advise pt of any urgent changes then to proceed to the ER.

## 2018-05-14 NOTE — PROGRESS NOTES
Chief Complaint   Patient presents with    Thyroid Problem     follow up       Mammogram 5/12/2017. Patient has an appt today. Patient also states she has a bone density today as well. Colonoscopy 3/12/2015 by Dr. Kb Willis repeat in 5 years. Patient states her last eye exam was 3/2018 by Dr. Ximena Retana. Patient signed medical release. 1. Have you been to the ER, urgent care clinic since your last visit? Hospitalized since your last visit? No    2. Have you seen or consulted any other health care providers outside of the [address] [place] [address] since your last visit? Include any pap smears or colon screening.  No

## 2018-05-14 NOTE — MR AVS SNAPSHOT
[address] [facility] 
 
 
 [facility] [id number] 
[phone] Patient: Chuck Irizarry MRN: [id number] FAU:[id number] Visit Information Date & Time Provider Department Dept. Phone Encounter #  
 5/14/2018  9:00 AM Paramjit Piper [phone] [id number] Follow-up Instructions Return in about 4 months (around 9/14/2018) for physical.  
  
Your Appointments 9/5/2018  8:15 AM  
ROUTINE CARE with Audra Foss MD  
[facility] [address] [facility]) Appt Note: cpe per dr Kwan Montoya [address] [facility] [id number] [phone] [address] [facility] [address] Upcoming Health Maintenance Date Due ZOSTER VACCINE AGE 60> 11/28/2009 Pneumococcal 65+ Low/Medium Risk (2 of 2 - PPSV23) 7/14/2018* Influenza Age 5 to Adult 8/1/2018 GLAUCOMA SCREENING Q2Y 3/31/2019 BREAST CANCER SCRN MAMMOGRAM 5/12/2019 COLONOSCOPY 3/12/2020 DTaP/Tdap/Td series (2 - Td) 4/19/2020 *Topic was postponed. The date shown is not the original due date. Allergies as of 5/14/2018  Review Complete On: 5/14/2018 By: Audra Foss MD  
  
 Severity Noted Reaction Type Reactions Codeine  10/07/2012    Nausea and Vomiting Mepivacaine  04/01/2010    Other (comments) Mepivacaine 3% Facial swelling Novocain [Procaine]  10/18/2010    Swelling Facial swelling Current Immunizations  Reviewed on 5/14/2018 Name Date Influenza Vaccine 8/8/2017, 9/15/2015, 12/11/2012 Influenza Vaccine PF 10/18/2013 Influenza Vaccine Split 10/21/2011, 10/18/2010 Pneumococcal Conjugate (PCV-13) 8/10/2015 Pneumococcal Polysaccharide (PPSV-23) 12/11/2012 TDAP Vaccine 4/19/2010 Zoster Recombinant 5/4/2018 Reviewed by Abhijeet Erickson LPN on 1/79/6967 at  9:37 AM  
You Were Diagnosed With   
  
 Codes Comments Hypothyroidism due to acquired atrophy of thyroid    -  Primary ICD-10-CM: E03.4 ICD-9-CM: 244.8, 246.8 Encounter for medication monitoring     ICD-10-CM: Z51.81 
ICD-9-CM: V58.83 Screening for diabetes mellitus     ICD-10-CM: Z13.1 ICD-9-CM: V77.1 Lipid screening     ICD-10-CM: F16.708 ICD-9-CM: V77.91 Osteoarthritis of right thumb     ICD-10-CM: M18.11 ICD-9-CM: 715.34 Encounter for chronic pain management     ICD-10-CM: G89.29 ICD-9-CM: V65.49 Vitals BP Pulse Temp Resp Height(growth percentile) Weight(growth percentile) 140/83 (BP 1 Location: Right arm, BP Patient Position: Sitting) 76 96.6 °F (35.9 °C) (Oral) 16 5' 1\" (1.549 m) 121 lb (54.9 kg) LMP SpO2 BMI OB Status Smoking Status 01/01/1983 97% 22.86 kg/m2 Hysterectomy Former Smoker Vitals History BMI and BSA Data Body Mass Index Body Surface Area  
 22.86 kg/m 2 1.54 m 2 Preferred Pharmacy Pharmacy Name Phone [facility] [place] [phone] Your Updated Medication List  
  
   
This list is accurate as of 5/14/18  1:36 PM.  Always use your most recent med list.  
  
  
  
  
 ALPRAZolam 1 mg tablet Commonly known as:  XANAX  
TAKE ONE OR TWO TABLETS BY MOUTH TWICE DAILY AS NEEDED for anxiety  
  
 black cohosh 540 mg Cap Take 1 Tab by mouth daily. CENTRUM SILVER WOMEN PO Take 1 Tab by mouth daily. CLARITIN-D 12 HOUR 5-120 mg per tablet Generic drug:  loratadine-pseudoephedrine Take 1 Tab by mouth two (2) times daily as needed. fluticasone 50 mcg/actuation nasal spray Commonly known as:  FLONASE  
USE TWO SPRAYS IN EACH NOSTRIL DAILY ICAPS AREDS Cap capsule Generic drug:  Vit A,C,E-Zinc-Copper Take 1 Cap by mouth.  
  
 levothyroxine 75 mcg tablet Commonly known as:  SYNTHROID Take 1 Tab by mouth Daily (before breakfast). naproxen 500 mg tablet Commonly known as:  NAPROSYN  
 Take 1 Tab by mouth two (2) times daily as needed. omeprazole 20 mg capsule Commonly known as:  PRILOSEC  
TAKE ONE CAPSULE BY MOUTH ONE TIME DAILY  
  
 traMADol 50 mg tablet Commonly known as:  ULTRAM  
Take 1 tablet by mouth every 6 hours as needed for pain. (Max 4 tablets per day) ZyrTEC 10 mg Cap Generic drug:  Cetirizine Take 1 Tab by mouth daily as needed. Prescriptions Printed Refills  
 traMADol (ULTRAM) 50 mg tablet 0 Sig: Take 1 tablet by mouth every 6 hours as needed for pain. (Max 4 tablets per day) Class: Print We Performed the Following 9-DRUG SCREEN + OXY, UR [id number] CPT(R)] AMB POC HEMOGLOBIN A1C [82841 CPT(R)] CBC W/O DIFF [70087 CPT(R)] LIPID PANEL [84334 CPT(R)] METABOLIC PANEL, COMPREHENSIVE [32034 CPT(R)] TSH 3RD GENERATION [20507 CPT(R)] Follow-up Instructions Return in about 4 months (around 9/14/2018) for physical.  
  
  
Introducing hospitals & HEALTH SERVICES! [employer] introduces "[employer]" patient portal. Now you can access parts of your medical record, email your doctor's office, and request medication refills online. 1. In your internet browser, go to [email or web address]. [email or web address] 2. Click on the First Time User? Click Here link in the Sign In box. You will see the New Member Sign Up page. 3. Enter your "[employer] [employer]" Access Code exactly as it appears below. You will not need to use this code after youve completed the sign-up process. If you do not sign up before the expiration date, you must request a new code. · "[employer]" Access Code: [email or web address] Expires: 8/9/2018 10:59 AM 
 
4. Enter the last four digits of your Social Security Number (xxxx) and Date of Birth (mm/dd/yyyy) as indicated and click Submit. You will be taken to the next sign-up page. 5. Create a "[employer]" ID. This will be your "[employer]" login ID and cannot be changed, so think of one that is secure and easy to remember. 6. Create a [employer] password. You can change your password at any time. 7. Enter your Password Reset Question and Answer. This can be used at a later time if you forget your password. 8. Enter your e-mail address. You will receive e-mail notification when new information is available in [address] 9. Click Sign Up. You can now view and download portions of your medical record. 10. Click the Download Summary menu link to download a portable copy of your medical information. If you have questions, please visit the Frequently Asked Questions section of the [employer] website. Remember, [employer] is NOT to be used for urgent needs. For medical emergencies, dial 911. Now available from your iPhone and Android! Please provide this summary of care documentation to your next provider. Your primary care clinician is listed as [facility]. If you have any questions after today's visit, please call [phone].

## 2018-05-15 DIAGNOSIS — F41.9 ANXIETY: ICD-10-CM

## 2018-05-15 LAB
ALBUMIN SERPL-MCNC: 4.9 G/DL (ref 3.6–4.8)
ALBUMIN/GLOB SERPL: 2.3 {RATIO} (ref 1.2–2.2)
ALP SERPL-CCNC: 71 IU/L (ref 39–117)
ALT SERPL-CCNC: 18 IU/L (ref 0–32)
AMPHETAMINES UR QL SCN: NEGATIVE NG/ML
AST SERPL-CCNC: 24 IU/L (ref 0–40)
BARBITURATES UR QL SCN: NEGATIVE NG/ML
BENZODIAZ UR QL SCN: POSITIVE NG/ML
BILIRUB SERPL-MCNC: 0.6 MG/DL (ref 0–1.2)
BUN SERPL-MCNC: 16 MG/DL (ref 8–27)
BUN/CREAT SERPL: 23 (ref 12–28)
BZE UR QL SCN: NEGATIVE NG/ML
CALCIUM SERPL-MCNC: 10.1 MG/DL (ref 8.7–10.3)
CANNABINOIDS UR QL SCN: NEGATIVE NG/ML
CHLORIDE SERPL-SCNC: 102 MMOL/L (ref 96–106)
CHOLEST SERPL-MCNC: 216 MG/DL (ref 100–199)
CO2 SERPL-SCNC: 24 MMOL/L (ref 18–29)
CREAT SERPL-MCNC: 0.7 MG/DL (ref 0.57–1)
CREAT UR-MCNC: 21.7 MG/DL (ref 20–300)
ERYTHROCYTE [DISTWIDTH] IN BLOOD BY AUTOMATED COUNT: 13.5 % (ref 12.3–15.4)
GFR SERPLBLD CREATININE-BSD FMLA CKD-EPI: 103 ML/MIN/1.73
GFR SERPLBLD CREATININE-BSD FMLA CKD-EPI: 89 ML/MIN/1.73
GLOBULIN SER CALC-MCNC: 2.1 G/DL (ref 1.5–4.5)
GLUCOSE SERPL-MCNC: 81 MG/DL (ref 65–99)
HCT VFR BLD AUTO: 42.4 % (ref 34–46.6)
HDLC SERPL-MCNC: 92 MG/DL
HGB BLD-MCNC: 14.4 G/DL (ref 11.1–15.9)
INTERPRETATION, 910389: NORMAL
LDLC SERPL CALC-MCNC: 112 MG/DL (ref 0–99)
MCH RBC QN AUTO: 29.7 PG (ref 26.6–33)
MCHC RBC AUTO-ENTMCNC: 34 G/DL (ref 31.5–35.7)
MCV RBC AUTO: 87 FL (ref 79–97)
METHADONE UR QL SCN: NEGATIVE NG/ML
OPIATES UR QL SCN: NEGATIVE NG/ML
OXYCODONE+OXYMORPHONE UR QL SCN: NEGATIVE NG/ML
PCP UR QL: NEGATIVE NG/ML
PH UR: 5.5 [PH] (ref 4.5–8.9)
PLATELET # BLD AUTO: 264 X10E3/UL (ref 150–379)
PLEASE NOTE:, 733163: NORMAL
POTASSIUM SERPL-SCNC: 5.3 MMOL/L (ref 3.5–5.2)
PROPOXYPH UR QL SCN: NEGATIVE NG/ML
PROT SERPL-MCNC: 7 G/DL (ref 6–8.5)
RBC # BLD AUTO: 4.85 X10E6/UL (ref 3.77–5.28)
SODIUM SERPL-SCNC: 142 MMOL/L (ref 134–144)
TRIGL SERPL-MCNC: 58 MG/DL (ref 0–149)
TSH SERPL DL<=0.005 MIU/L-ACNC: 0.55 UIU/ML (ref 0.45–4.5)
VLDLC SERPL CALC-MCNC: 12 MG/DL (ref 5–40)
WBC # BLD AUTO: 5.1 X10E3/UL (ref 3.4–10.8)

## 2018-05-15 RX ORDER — ALPRAZOLAM 1 MG/1
TABLET ORAL
Qty: 60 TAB | Refills: 1 | OUTPATIENT
Start: 2018-05-15 | End: 2018-07-11 | Stop reason: SDUPTHER

## 2018-05-18 DIAGNOSIS — K21.9 GASTROESOPHAGEAL REFLUX DISEASE, ESOPHAGITIS PRESENCE NOT SPECIFIED: Primary | ICD-10-CM

## 2018-05-18 RX ORDER — OMEPRAZOLE 20 MG/1
CAPSULE, DELAYED RELEASE ORAL
Qty: 90 CAP | Refills: 3 | Status: SHIPPED | OUTPATIENT
Start: 2018-05-18 | End: 2018-11-02 | Stop reason: SDUPTHER

## 2018-07-11 DIAGNOSIS — F41.9 ANXIETY: ICD-10-CM

## 2018-07-11 NOTE — TELEPHONE ENCOUNTER
Last Visit: 5/14/18-Reji  Next Appt: 9/5/18-Reji  Previous Refill Encounter: 5/15-60+1    Requested Prescriptions     Pending Prescriptions Disp Refills    ALPRAZolam (XANAX) 1 mg tablet 60 Tab 0     Sig: TAKE ONE OR TWO TABLETS BY MOUTH TWICE DAILY AS NEEDED for anxiety

## 2018-07-12 RX ORDER — ALPRAZOLAM 1 MG/1
TABLET ORAL
Qty: 60 TAB | Refills: 0 | OUTPATIENT
Start: 2018-07-12 | End: 2018-08-07 | Stop reason: SDUPTHER

## 2018-09-05 ENCOUNTER — OFFICE VISIT (OUTPATIENT)
Dept: FAMILY MEDICINE CLINIC | Age: 68
End: 2018-09-05

## 2018-09-05 VITALS
BODY MASS INDEX: 20.84 KG/M2 | TEMPERATURE: 97.8 F | RESPIRATION RATE: 16 BRPM | SYSTOLIC BLOOD PRESSURE: 130 MMHG | HEART RATE: 78 BPM | WEIGHT: 110.4 LBS | OXYGEN SATURATION: 96 % | DIASTOLIC BLOOD PRESSURE: 88 MMHG | HEIGHT: 61 IN

## 2018-09-05 DIAGNOSIS — F41.9 ANXIETY: ICD-10-CM

## 2018-09-05 DIAGNOSIS — E03.4 HYPOTHYROIDISM DUE TO ACQUIRED ATROPHY OF THYROID: ICD-10-CM

## 2018-09-05 DIAGNOSIS — Z51.81 ENCOUNTER FOR MEDICATION MONITORING: ICD-10-CM

## 2018-09-05 DIAGNOSIS — Z23 ENCOUNTER FOR IMMUNIZATION: ICD-10-CM

## 2018-09-05 DIAGNOSIS — Z00.00 ROUTINE GENERAL MEDICAL EXAMINATION AT A HEALTH CARE FACILITY: Primary | ICD-10-CM

## 2018-09-05 DIAGNOSIS — Z12.11 ENCOUNTER FOR SCREENING FECAL OCCULT BLOOD TESTING: ICD-10-CM

## 2018-09-05 DIAGNOSIS — R10.31 RIGHT LOWER QUADRANT ABDOMINAL PAIN: ICD-10-CM

## 2018-09-05 DIAGNOSIS — M25.50 ARTHRALGIA OF MULTIPLE JOINTS: ICD-10-CM

## 2018-09-05 DIAGNOSIS — R07.9 LEFT SIDED CHEST PAIN: ICD-10-CM

## 2018-09-05 DIAGNOSIS — R52 ENCOUNTER FOR PAIN MANAGEMENT: ICD-10-CM

## 2018-09-05 LAB
BILIRUB UR QL STRIP: NEGATIVE
GLUCOSE UR-MCNC: NEGATIVE MG/DL
HEMOCCULT STL QL: NEGATIVE
KETONES P FAST UR STRIP-MCNC: NEGATIVE MG/DL
PH UR STRIP: 6 [PH] (ref 4.6–8)
PROT UR QL STRIP: NEGATIVE
SP GR UR STRIP: 1 (ref 1–1.03)
UA UROBILINOGEN AMB POC: NORMAL (ref 0.2–1)
URINALYSIS CLARITY POC: CLEAR
URINALYSIS COLOR POC: YELLOW
URINE BLOOD POC: NORMAL
URINE LEUKOCYTES POC: NEGATIVE
URINE NITRITES POC: NEGATIVE
VALID INTERNAL CONTROL?: YES

## 2018-09-05 RX ORDER — ZOSTER VACCINE RECOMBINANT, ADJUVANTED 50 MCG/0.5
KIT INTRAMUSCULAR
COMMUNITY
Start: 2018-07-13 | End: 2018-09-05 | Stop reason: ALTCHOICE

## 2018-09-05 RX ORDER — ALPRAZOLAM 1 MG/1
TABLET ORAL
Qty: 60 TAB | Refills: 1 | Status: SHIPPED | OUTPATIENT
Start: 2018-09-05 | End: 2018-11-07 | Stop reason: SDUPTHER

## 2018-09-05 RX ORDER — BLACK COHOSH ROOT 40 MG
1 TABLET ORAL DAILY
COMMUNITY

## 2018-09-05 NOTE — PROGRESS NOTES
Subjective:  
76 y.o. female for Well Woman Check. Patient's last menstrual period was 01/01/1983. Hypercholesterolemia follow up: 
Compliant w/ low fat, low cholesterol diet. Exercising some. Patient fasting today. Thyroid Review: 
Patient is seen for followup of hypothyroidism. Thyroid ROS: denies fatigue, heat/cold intolerance, bowel/skin changes or CVS symptoms. Weight is up from last visit. HM: 
Mammogram: 5/14/2018 Colonoscopy 3/12/2015 by Dr. Antonette Gilmore in 5 yrs Encounter for pain management. 1./2. Medical history/Past medical History Chronic Pain: C/o pain in the right right thumb from arthritits. Aches and has increased pain with trying to  her grandchildren. Increase pain in the thumb is so severe at times. Using the tramadol for severe pain. Taking NSAIDs upsets her stomach. 3. Applicable records from prior treatment providers are apart of [facility] under the media tab. 4. Diagnostic, therapeutic and laboratory results are available in the [address] [place] [address] chart. 5. Consultation notes are available for review in the media tab of the [address] [place] [address] chart. 6. Treatment goals include pain control so that the pt may be as active and function with her daily activities and improved comfort level. Previously pt has been limited by pain. 7. The risks and benefits of treatment has been discussed at this office visit with the pt. She understands that the medication has addicting potential.  Additionally the pt has been advised that narcotic pain medication may impair mental and/or physical ability required for performance of tasks such as driving or operating any other machinery. 8. Pt has an updated signed pain contract on file and can be found under the FYI section of the [facility] chart. 9. The pain contract is reviewed. Pain medication will be continued at the previous dosage. Urine drug screening will be done today.   Diagnostic studies are not indicated at this time. Interventional procedure are not indicated at this time. 10. Medication prescibed is traMADol (ULTRAM) 50 mg tablet.  has been reviewed at this OV by me. 11. Patient instructions have been reviewed in detail as outlined above and in the pain contract. 12. Re-eval is planned for 3 months. Wt Readings from Last 3 Encounters:  
09/05/18 110 lb 6.4 oz (50.1 kg) 05/14/18 121 lb (54.9 kg)  
11/13/17 119 lb 3.2 oz (54.1 kg) Patient Active Problem List  
Diagnosis Code  Menopause Z78.0  Hypothyroidism E03.9  Anxiety F41.9  GERD (gastroesophageal reflux disease) K21.9  Lung nodule R91.1  Degenerative arthritis of thumb M18.10 Current Outpatient Prescriptions Medication Sig Dispense Refill  black cohosh 40 mg tab Take 1 Tab by mouth daily.  calcium citrate/vitamin D3 (CITRACAL + D PO) Take 1 Tab by mouth two (2) times a day.  ALPRAZolam (XANAX) 1 mg tablet TAKE ONE TABLET BY MOUTH TWICE DAILY AS NEEDED for anxiety. DO NOT TAKE MORE THAN 2 TABLETS PER DAY. 60 Tab 1  
 omeprazole (PRILOSEC) 20 mg capsule TAKE ONE CAPSULE BY MOUTH ONE TIME DAILY 90 Cap 3  
 multivit-min/iron/folic/lutein (CENTRUM SILVER WOMEN PO) Take 1 Tab by mouth daily.  loratadine-pseudoephedrine (CLARITIN-D 12 HOUR) 5-120 mg per tablet Take 1 Tab by mouth two (2) times daily as needed.  traMADol (ULTRAM) 50 mg tablet Take 1 tablet by mouth every 6 hours as needed for pain. (Max 4 tablets per day) 30 Tab 0  
 levothyroxine (SYNTHROID) 75 mcg tablet Take 1 Tab by mouth Daily (before breakfast). 30 Tab 11  Vit A,C,E-Zinc-Copper (ICAPS AREDS) cap capsule Take 1 Cap by mouth.  Cetirizine (ZYRTEC) 10 mg cap Take 1 Tab by mouth daily as needed.  fluticasone (FLONASE) 50 mcg/actuation nasal spray USE TWO SPRAYS IN EACH NOSTRIL DAILY  16 g 10 Allergies Allergen Reactions  Codeine Nausea and Vomiting  Mepivacaine Other (comments) Mepivacaine 3% Facial swelling  Novocain [Procaine] Swelling Facial swelling Past Medical History:  
Diagnosis Date  Anxiety 4/15/2010  Environmental allergies  GERD (gastroesophageal reflux disease)  Hypothyroidism 4/15/2010  Lung nodule  Menopause 4/15/2010  Pneumonia  Thyroid disease Past Surgical History:  
Procedure Laterality Date  HX APPENDECTOMY [facility]  MO COLONOSCOPY FLX DX W/COLLJ SPEC WHEN PFRMD  8/4/2010  
 [facility] Family History Problem Relation Age of Onset  Lung Disease Mother   
  emphysema  Hypertension Mother  Cancer Father   
  colon  Alzheimer Brother Social History Substance Use Topics  Smoking status: Former Smoker Quit date: 1/1/2000  Smokeless tobacco: Never Used  Alcohol use 0.0 oz/week  
  0 Standard drinks or equivalent per week Comment: occasional  
 
Lab Results Component Value Date/Time WBC 5.1 05/14/2018 10:15 AM  
HGB 14.4 05/14/2018 10:15 AM  
HCT 42.4 05/14/2018 10:15 AM  
PLATELET 570 15/95/0482 10:15 AM  
MCV 87 05/14/2018 10:15 AM  
 
Lab Results Component Value Date/Time Cholesterol, total 216 (H) 05/14/2018 10:15 AM  
HDL Cholesterol 92 05/14/2018 10:15 AM  
LDL, calculated 112 (H) 05/14/2018 10:15 AM  
Triglyceride 58 05/14/2018 10:15 AM  
CHOL/HDL Ratio 2.3 02/17/2009 04:08 PM  
 
Lab Results Component Value Date/Time TSH 0.545 05/14/2018 10:15 AM  
T4, Free 1.56 12/11/2015 08:13 AM  
  
Lab Results Component Value Date/Time  Sodium 142 05/14/2018 10:15 AM  
 Potassium 5.3 (H) 05/14/2018 10:15 AM  
 Chloride 102 05/14/2018 10:15 AM  
 CO2 24 05/14/2018 10:15 AM  
 Anion gap 6 10/07/2012 11:30 AM  
 Glucose 81 05/14/2018 10:15 AM  
 BUN 16 05/14/2018 10:15 AM  
 Creatinine 0.70 05/14/2018 10:15 AM  
 BUN/Creatinine ratio 23 05/14/2018 10:15 AM  
 GFR est  05/14/2018 10:15 AM  
 GFR est non-AA 89 05/14/2018 10:15 AM  
 Calcium 10.1 05/14/2018 10:15 AM  
 Bilirubin, total 0.6 05/14/2018 10:15 AM  
 ALT (SGPT) 18 05/14/2018 10:15 AM  
 AST (SGOT) 24 05/14/2018 10:15 AM  
 Alk. phosphatase 71 05/14/2018 10:15 AM  
 Protein, total 7.0 05/14/2018 10:15 AM  
 Albumin 4.9 (H) 05/14/2018 10:15 AM  
 Globulin 3.7 10/07/2012 11:30 AM  
 A-G Ratio 2.3 (H) 05/14/2018 10:15 AM  
  
Lab Results Component Value Date/Time Hemoglobin A1c 5.3 04/20/2012 10:34 AM  
 Hemoglobin A1c (POC) 4.9 05/14/2018 10:15 AM  
   
 
 
ROS:  Feeling well. No dyspnea or chest pain on exertion. No abdominal pain, change in bowel habits, black or bloody stools. No urinary tract symptoms. GYN ROS: no breast pain or new or enlarging lumps on self exam, no discharge or pelvic pain, no hot flashes. No neurological complaints. C/o left side pain under the left breast.  Comes and goes. Pain is 5/10. Last for about 30 minutes and then subsides. Not related to activities or eating. No SOB. Occurs about 1 to 2 times a month for the past couple of months. Also c/o pain in the right lower abd. \"Feels like ovation pain\". Seems to happen about once a month. Takes advil which tom help with the pain. No urinary sx. No vaginal discharge. Objective:  
 
Visit Vitals  /88 (BP 1 Location: Left arm, BP Patient Position: Sitting)  Pulse 78  Temp 97.8 °F (36.6 °C) (Oral)  Resp 16  
 Ht 5' 1\" (1.549 m)  Wt 110 lb 6.4 oz (50.1 kg)  LMP 01/01/1983  SpO2 96%  BMI 20.86 kg/m2 The patient appears well, alert, oriented x 3, in no distress. ENT normal.  Neck supple. No adenopathy or thyromegaly. WILBERT. Lungs are clear, good air entry, no wheezes, rhonchi or rales. S1 and S2 normal, no murmurs, regular rate and rhythm. Abdomen soft without tenderness, guarding, mass or organomegaly. Extremities show no edema, normal peripheral pulses. Neurological is normal, no focal findings. BREAST EXAM: breasts appear normal, no suspicious masses, no skin or nipple changes or axillary nodes PELVIC EXAM: RECTAL: normal rectal, no masses, guaiac negative stool obtained Bimanual exam vaginal without any pain or masses noted in the adnexal.   
 
Assessment/Plan:  
well woman 
mammogram 
counseled on breast self exam, mammography screening, osteoporosis and adequate intake of calcium and vitamin D 
additional lab tests per orders Natanael Kebede was seen today for complete physical. 
 
ASSESSMENT and PLAN Diagnoses and all orders for this visit: 1. Routine general medical examination at a health care facility -     AMB POC URINALYSIS DIP STICK AUTO W/ MICRO 2. Hypothyroidism due to acquired atrophy of thyroid 
-     TSH 3RD GENERATION 3. Anxiety -     Refill ALPRAZolam (XANAX) 1 mg tablet; TAKE ONE TABLET BY MOUTH TWICE DAILY AS NEEDED for anxiety. DO NOT TAKE MORE THAN 2 TABLETS PER DAY. 4. Left sided chest pain -     AMB POC EKG ROUTINE W/ 12 LEADS, INTER & REP  -  NSR, WNL Negative risk factors for cardiac cause. Watch for triggers that bring on the pain. Watch for food triggers. Add Zantac as needed to see if this helps with the discomfort. 5. Right lower quadrant abdominal pain 
-     US ABD COMP; Future -     US PELV NON OBS; Future 6. Encounter for medication monitoring 7. Encounter for screening fecal occult blood testing -     AMB POC FECAL BLOOD, OCCULT, QL 1 CARD 8. Arthralgia of multiple joints// 
9. Encounter for pain management 
-     9-DRUG SCREEN + OXY, UR No refill is needed today for the tramadol. The pt has signed medication agreement. Pain contract is reviewed. Pain medications will be continued at the previous dosage. Urine drug screening will be done today. Diagnostic  studies are not indicated at this time. Interventional procedure are not indicated at this time. Re-eval in 3 months. Follow-up Disposition: Return in about 6 months (around 3/5/2019). reviewed diet, exercise and weight control 
cardiovascular risk and specific lipid/LDL goals reviewed 
reviewed medications and side effects in detail I have discussed diagnosis listed in this note with pt and/or family. I have discussed treatment plans and options and the risk/benefit analysis of those options, including safe use of medications and possible medication side effects. Through the use of shared decision making we have agreed to the above plan. The patient has received an after-visit summary and questions were answered concerning future plans and follow up. Advise pt of any urgent changes then to proceed to the ER.

## 2018-09-05 NOTE — PROGRESS NOTES
Chief Complaint Patient presents with  Complete Physical  
  LMP 1/1/1983 Mammogram: 5/14/2018 Colonoscopy 3/12/2015 by Dr. Antonette Gilmore in 5 yrs 1. Have you been to the ER, urgent care clinic since your last visit? Hospitalized since your last visit? No 
 
2. Have you seen or consulted any other health care providers outside of the [address] [place] [address] since your last visit? Include any pap smears or colon screening. No 
 
 
Verbal order received per Dr. Mariela Palomo 23 IM for need for immunization-VORB. Pt received Pneumococcal 23 IM in right deltoid without any difficulty.

## 2018-09-05 NOTE — LETTER
Bronwyn Aguilera XID:[birth year]27 MR #:[id number] Provider Name:Guerita Leavitt Se, MD  
*ISLC-629* BSMG-491 (5/16) Page 1 of 5 Initial Web[employer] CONTROLLED SUBSTANCE AGREEMENT I may be prescribed medications that are controlled substances as part  of my treatment plan for management of my medical condition(s). The goal of my treatment plan is to maintain and/or improve my health and wellbeing. Because controlled substances have an increased risk of abuse or harm, continual re-evaluation is needed determine if the goals of my treatment plan are being met for my safety and the safety of others. Favio Coyle  am entering into this Controlled Substance Agreement with my provider, Vivek Rosa MD at [facility] . I understand that successful treatment requires mutual trust and honesty between me and my provider. I understand that there are state and federal laws and regulations which apply to the medications that my provider may prescribe that must be followed. I understand there are risks and benefits ts of taking the medicines that my provider may prescribe. I understand and agree that following this Agreement is necessary in continuing my provider-patient relationship and success of my treatment plan. As a part of my treatment plan, I agree to the following: COMMUNICATION: 
 
1. I will communicate fully with my provider about my medical condition(s), including the effect on my daily life and how well my medications are helping. I will tell my provider all of the medications that I take for any reason, including medications I receive from another health care provider, and will notify my provider about all issues, problems or concerns, including any side effects, which may be related to my medications. I understand that this information allows my provider to adjust my treatment plan to help manage my medical condition.  I understand that this information will become part of my permanent medical record. 2. I will notify my provider if I have a history of alcohol/drug misuse/addiction or if I have had treatment for alcohol/drug addiction in the past, or if I have a new problem with or concern about alcohol/drug use/addiction, because this increases the likelihood of high risk behaviors and may lead to serious medical conditions. 3. Females Only: I will notify my provider if I am or become pregnant, or if I intend to become pregnant, or if I intend to breastfeed. I understand that communication of these issues with my provider is important, due to possible effects my medication could have on an unborn fetus or breastfeeding child. [facility]:5/[birth year]98 MR #:[id number] Provider Name:Guerita Andrade MD  
*JAWL-404* BSMG-491 (5/16) Page 2 of 5 Initial [employer] MISUSE OF MEDICATIONS / DRUGS: 
 
1. I agree to take all controlled substances as prescribed, and will not misuse or abuse any controlled substances prescribed by my provider. For my safety, I will not increase the amount of medicine I take without first talking with and getting permission from my provider. 2. If I have a medical emergency, another health care provider may prescribe me medication. If I seek emergency treatment, I will notify my provider within seventy-two (72) hours. 3. I understand that my provider may discuss my use and/or possible misuse/abuse of controlled substances and alcohol, as appropriate, with any health care provider involved in my care, pharmacist or legal authority. ILLEGAL DRUGS: 
 
1. I will not use illegal drugs of any kind, including but not limited to marijuana, heroin, cocaine, or any prescription drug which is not prescribed to me. DRUG DIVERSION / PRESCRIPTION FRAUD: 
 
1. I will not share, sell, trade, give away, or otherwise misuse my prescriptions or medications. 2. I will not alter any prescriptions provided to me by my provider. SINGLE PROVIDER: 
 
1. I agree that all controlled substances that I take will be prescribed only by my provider (or his/her covering provider) under this Agreement. This agreement does not prevent me from seeking emergency medical treatment or receiving pain management related to a surgery. PROTECTING MEDICATIONS: 
 
1. I am responsible for keeping my prescriptions and medications in a safe and secure place including safeguarding them from loss or theft. I understand that lost, stolen or damaged/destroyed prescriptions or medications will not be replaced. Idalia Tamez ZNU:9[birth year] MR #:[id number] Provider Name:Guerita Miles MD  
*LCQX-028* BSMG-491 (5/16) Page 3 of 5 Initial [employer] PRESCRIPTION RENEWALS/REFILLS: 
 
1. I will follow my controlled substance medication schedule as prescribed by my provider. 2. I understand and agree that I will make any requests for renewals or refills of my prescriptions only at the time of an office visit or during my providers regular office hours subject to the prescription refill requirements of the individual practice. 3. I understand that my provider may not call in prescriptions for controlled substances to my pharmacy. 4. I understand that my provider may adjust or discontinue these medications as deemed appropriate for my medical treatment plan. This Agreement does not guarantee the prescription of controlled medications. 5. I agree that if my medications are adjusted or discontinued, I will properly dispose of any remaining medications. I understand that I will be required to dispose of any remaining controlled medications prior to being provided with any prescriptions for other controlled medications.  
 
 
1. I authorize my provider and my pharmacy to cooperate fully with any local, state, or federal law enforcement agency in the investigation of any possible misuse, sale, or other diversion of my controlled substance prescriptions or medications. RISKS: 
 
 
1. I understand that if I do not adhere to this Agreement in any way, my provider may change my prescriptions, stop prescribing controlled substances or end our provider-patient relationship. 2. If my provider decides to stop prescribing medication, or decides to end our provider-patient relationship,my provider may require that I taper my medications slowly. If necessary, my provider may also provide a prescription for other medications to treat my withdrawal symptoms. UNDERSTANDING THIS AGREEMENT: 
 
I understand that my provider may adjust or stop my prescriptions for controlled substances based on my medical condition and my treatment plan. I understand that this Agreement does not guarantee that I will be prescribed medications or controlled substances. I understand that controlled substances may be just one part 
of my treatment plan. My initial on each page and my signature below shows that I have read each page of this Agreement, I have had an opportunity to ask questions, and all of my questions have been answered to my satisfaction by my provider. By signing below, I agree to comply with this Agreement, and I understand that if I do not follow the Agreements listed above, my provider may stop 
 
 
 
_________________________________________  Date/Time 9/5/2018 7:58 AM   
             (Patient Signature)

## 2018-09-05 NOTE — MR AVS SNAPSHOT
[facility] 
 
 
 [address] [facility] [address] 
[phone] Patient: Km Diaz MRN: [id number] IZQ:[birth year] Visit Information Date & Time Provider Department Dept. Phone Encounter #  
 9/5/2018  8:15 AM Johnathan Aguial MD [facility] [phone] [id number] Follow-up Instructions Return in about 6 months (around 3/5/2019). Upcoming Health Maintenance Date Due Pneumococcal 65+ Low/Medium Risk (2 of 2 - PPSV23) 12/11/2017 ZOSTER VACCINE AGE 60> 11/12/2018* Influenza Age 5 to Adult 11/12/2018* GLAUCOMA SCREENING Q2Y 3/31/2019 COLONOSCOPY 3/12/2020 DTaP/Tdap/Td series (2 - Td) 4/19/2020 BREAST CANCER SCRN MAMMOGRAM 5/14/2020 *Topic was postponed. The date shown is not the original due date. Allergies as of 9/5/2018  Review Complete On: 9/5/2018 By: Johnathan Aguila MD  
  
 Severity Noted Reaction Type Reactions Codeine  10/07/2012    Nausea and Vomiting Mepivacaine  04/01/2010    Other (comments) Mepivacaine 3% Facial swelling Novocain [Procaine]  10/18/2010    Swelling Facial swelling Current Immunizations  Reviewed on 9/5/2018 Name Date Influenza High Dose Vaccine PF 8/26/2018 Influenza Vaccine 8/8/2017, 9/15/2015, 12/11/2012 Influenza Vaccine PF 10/18/2013 Influenza Vaccine Split 10/21/2011, 10/18/2010 Pneumococcal Conjugate (PCV-13) 8/10/2015 Pneumococcal Polysaccharide (PPSV-23) 12/11/2012 TDAP Vaccine 4/19/2010 Zoster Recombinant 7/13/2018, 5/4/2018, 5/4/2018 Reviewed by Henri Castorena LPN on 4/5/5669 at  1:77 AM  
 Reviewed by Johnathan Aguila MD on 9/5/2018 at  8:13 AM  
 Reviewed by Johnathan Aguila MD on 9/5/2018 at  8:13 AM  
You Were Diagnosed With   
  
 Codes Comments Hypothyroidism due to acquired atrophy of thyroid    -  Primary ICD-10-CM: E03.4 ICD-9-CM: 244.8, 246.8 Anxiety     ICD-10-CM: F41.9 ICD-9-CM: 300.00 Encounter for medication monitoring     ICD-10-CM: Z51.81 
ICD-9-CM: V58.83 Routine general medical examination at a health care facility     ICD-10-CM: Z00.00 ICD-9-CM: V70.0 Encounter for screening fecal occult blood testing     ICD-10-CM: Z12.11 ICD-9-CM: V76.51 Left sided chest pain     ICD-10-CM: R07.9 ICD-9-CM: 786.50 Encounter for pain management     ICD-10-CM: A65 ICD-9-CM: 780.96 Vitals BP Pulse Temp Resp Height(growth percentile) Weight(growth percentile) 130/88 (BP 1 Location: Left arm, BP Patient Position: Sitting) 78 97.8 °F (36.6 °C) (Oral) 16 5' 1\" (1.549 m) 110 lb 6.4 oz (50.1 kg) LMP SpO2 BMI OB Status Smoking Status 01/01/1983 96% 20.86 kg/m2 Hysterectomy Former Smoker Vitals History BMI and BSA Data Body Mass Index Body Surface Area  
 20.86 kg/m 2 1.47 m 2 Preferred Pharmacy Pharmacy Name Phone [facility] [place] [phone] Your Updated Medication List  
  
   
This list is accurate as of 9/5/18  8:51 AM.  Always use your most recent med list.  
  
  
  
  
 ALPRAZolam 1 mg tablet Commonly known as:  XANAX  
TAKE ONE TABLET BY MOUTH TWICE DAILY AS NEEDED for anxiety. DO NOT TAKE MORE THAN 2 TABLETS PER DAY. black cohosh 40 mg Tab Take 1 Tab by mouth daily. CENTRUM SILVER WOMEN PO Take 1 Tab by mouth daily. CITRACAL + D PO Take 1 Tab by mouth two (2) times a day. CLARITIN-D 12 HOUR 5-120 mg per tablet Generic drug:  loratadine-pseudoephedrine Take 1 Tab by mouth two (2) times daily as needed. fluticasone 50 mcg/actuation nasal spray Commonly known as:  FLONASE  
USE TWO SPRAYS IN EACH NOSTRIL DAILY ICAPS AREDS Cap capsule Generic drug:  Vit A,C,E-Zinc-Copper Take 1 Cap by mouth.  
  
 levothyroxine 75 mcg tablet Commonly known as:  SYNTHROID  
 Take 1 Tab by mouth Daily (before breakfast). omeprazole 20 mg capsule Commonly known as:  PRILOSEC  
TAKE ONE CAPSULE BY MOUTH ONE TIME DAILY  
  
 traMADol 50 mg tablet Commonly known as:  ULTRAM  
Take 1 tablet by mouth every 6 hours as needed for pain. (Max 4 tablets per day) ZyrTEC 10 mg Cap Generic drug:  Cetirizine Take 1 Tab by mouth daily as needed. Prescriptions Printed Refills ALPRAZolam (XANAX) 1 mg tablet 1 Sig: TAKE ONE TABLET BY MOUTH TWICE DAILY AS NEEDED for anxiety. DO NOT TAKE MORE THAN 2 TABLETS PER DAY. Class: Print We Performed the Following 9-DRUG SCREEN + OXY, UR B3672580 CPT(R)] AMB POC EKG ROUTINE W/ 12 LEADS, INTER & REP [78422 CPT(R)] AMB POC FECAL BLOOD, OCCULT, QL 1 CARD [14042 CPT(R)] AMB POC URINALYSIS DIP STICK AUTO W/ MICRO [30439 CPT(R)] TSH 3RD GENERATION [67532 CPT(R)] Follow-up Instructions Return in about 6 months (around 3/5/2019). Introducing [facility]! Lilliana Sepulveda introduces [employer] patient portal. Now you can access parts of your medical record, email your doctor's office, and request medication refills online. 1. In your internet browser, go to [email or web address] 2. Click on the First Time User? Click Here link in the Sign In box. You will see the New Member Sign Up page. 3. Enter your [employer] Access Code exactly as it appears below. You will not need to use this code after youve completed the sign-up process. If you do not sign up before the expiration date, you must request a new code. · [employer] Access Code: [email or web address] Expires: 12/4/2018  7:44 AM 
 
4. Enter the last four digits of your Social Security Number (xxxx) and Date of Birth (mm/dd/yyyy) as indicated and click Submit. You will be taken to the next sign-up page. 5. Create a [employer] ID.  This will be your [employer] login ID and cannot be changed, so think of one that is secure and easy to remember. 6. Create a [employer] password. You can change your password at any time. 7. Enter your Password Reset Question and Answer. This can be used at a later time if you forget your password. 8. Enter your e-mail address. You will receive e-mail notification when new information is available in [address] 9. Click Sign Up. You can now view and download portions of your medical record. 10. Click the Download Summary menu link to download a portable copy of your medical information. If you have questions, please visit the Frequently Asked Questions section of the [employer] website. Remember, [employer] is NOT to be used for urgent needs. For medical emergencies, dial 911. Now available from your iPhone and Android! Please provide this summary of care documentation to your next provider. Your primary care clinician is listed as Hardeep Carlos. If you have any questions after today's visit, please call [phone].

## 2018-09-06 DIAGNOSIS — E03.4 HYPOTHYROIDISM DUE TO ACQUIRED ATROPHY OF THYROID: ICD-10-CM

## 2018-09-06 LAB
AMPHETAMINES UR QL SCN: NEGATIVE NG/ML
BARBITURATES UR QL SCN: NEGATIVE NG/ML
BENZODIAZ UR QL SCN: POSITIVE NG/ML
BZE UR QL SCN: NEGATIVE NG/ML
CANNABINOIDS UR QL SCN: NEGATIVE NG/ML
CREAT UR-MCNC: 37.8 MG/DL (ref 20–300)
METHADONE UR QL SCN: NEGATIVE NG/ML
OPIATES UR QL SCN: NEGATIVE NG/ML
OXYCODONE+OXYMORPHONE UR QL SCN: NEGATIVE NG/ML
PCP UR QL: NEGATIVE NG/ML
PH UR: 5.7 [PH] (ref 4.5–8.9)
PLEASE NOTE:, 733163: ABNORMAL
PROPOXYPH UR QL SCN: NEGATIVE NG/ML
TSH SERPL DL<=0.005 MIU/L-ACNC: 2.51 UIU/ML (ref 0.45–4.5)

## 2018-09-06 NOTE — TELEPHONE ENCOUNTER
[facility] Requests A 90 day supply on the patients behalf. Last Visit: 9/60-Xgsjmu-Vfhuwa  Next Appt: 3/1-Fqyojb-Ljmrzh  Previous Refill Encounter: 3/6-30+11    Requested Prescriptions     Pending Prescriptions Disp Refills    levothyroxine (SYNTHROID) 75 mcg tablet 90 Tab 3     Sig: Take 1 Tab by mouth Daily (before breakfast).

## 2018-09-07 RX ORDER — LEVOTHYROXINE SODIUM 75 UG/1
75 TABLET ORAL
Qty: 90 TAB | Refills: 3 | Status: SHIPPED | OUTPATIENT
Start: 2018-09-07 | End: 2019-08-23 | Stop reason: SDUPTHER

## 2018-09-14 ENCOUNTER — HOSPITAL ENCOUNTER (OUTPATIENT)
Dept: ULTRASOUND IMAGING | Age: 68
Discharge: HOME | End: 2018-09-14
Attending: FAMILY MEDICINE
Payer: COMMERCIAL

## 2018-09-14 DIAGNOSIS — R10.31 RIGHT LOWER QUADRANT ABDOMINAL PAIN: ICD-10-CM

## 2018-09-14 DIAGNOSIS — R10.31 RLQ ABDOMINAL PAIN: ICD-10-CM

## 2018-09-14 PROCEDURE — 76700 US EXAM ABDOM COMPLETE: CPT

## 2018-09-14 PROCEDURE — 76856 US EXAM PELVIC COMPLETE: CPT

## 2018-09-18 ENCOUNTER — DOCUMENTATION ONLY (OUTPATIENT)
Dept: FAMILY MEDICINE CLINIC | Age: 68
End: 2018-09-18

## 2018-10-05 DIAGNOSIS — M18.11 OSTEOARTHRITIS OF RIGHT THUMB: ICD-10-CM

## 2018-10-05 RX ORDER — TRAMADOL HYDROCHLORIDE 50 MG/1
TABLET ORAL
Qty: 30 TAB | Refills: 0 | OUTPATIENT
Start: 2018-10-05 | End: 2019-04-23 | Stop reason: SDUPTHER

## 2018-10-05 NOTE — TELEPHONE ENCOUNTER
Last Visit: 9/5  Next Appt: 3/5  Previous Refill Encounter: 5/14-30+0    Requested Prescriptions     Pending Prescriptions Disp Refills    traMADol (ULTRAM) 50 mg tablet 30 Tab 0     Sig: Take 1 tablet by mouth every 6 hours as needed for pain.  (Max 4 tablets per day)

## 2018-11-02 DIAGNOSIS — K21.9 GASTROESOPHAGEAL REFLUX DISEASE, ESOPHAGITIS PRESENCE NOT SPECIFIED: ICD-10-CM

## 2018-11-02 RX ORDER — OMEPRAZOLE 20 MG/1
CAPSULE, DELAYED RELEASE ORAL
Qty: 90 CAP | Refills: 3 | Status: SHIPPED | OUTPATIENT
Start: 2018-11-02 | End: 2020-01-24

## 2018-11-07 DIAGNOSIS — F41.9 ANXIETY: ICD-10-CM

## 2018-11-07 RX ORDER — ALPRAZOLAM 1 MG/1
TABLET ORAL
Qty: 60 TAB | Refills: 1 | OUTPATIENT
Start: 2018-11-07 | End: 2019-02-02 | Stop reason: SDUPTHER

## 2018-11-07 NOTE — TELEPHONE ENCOUNTER
Last Visit: 9/5  Next Appt: 3/5  Previous Refill Encounter: 9/5-60+1    Requested Prescriptions     Pending Prescriptions Disp Refills    ALPRAZolam (XANAX) 1 mg tablet 60 Tab 1     Sig: TAKE ONE TABLET BY MOUTH TWICE DAILY AS NEEDED for anxiety. DO NOT TAKE MORE THAN 2 TABLETS PER DAY.

## 2018-12-27 DIAGNOSIS — M18.11 OSTEOARTHRITIS OF RIGHT THUMB: ICD-10-CM

## 2018-12-28 ENCOUNTER — TELEPHONE (OUTPATIENT)
Dept: FAMILY MEDICINE CLINIC | Age: 68
End: 2018-12-28

## 2018-12-28 RX ORDER — TRAMADOL HYDROCHLORIDE 50 MG/1
TABLET ORAL
Qty: 30 TAB | Refills: 0 | OUTPATIENT
Start: 2018-12-28

## 2018-12-28 NOTE — TELEPHONE ENCOUNTER
Called patient LM that Dr. Brand is unable to refill Tramadol due to the new laws and regulations. Pain contract needs to updated and need a urine drug. An appt has been made for 1/7/2019 at 9:45am. If unable to make appt please call back to reschedule. If appt comes available sooner will call back. Any questions please call back.

## 2019-02-02 DIAGNOSIS — F41.9 ANXIETY: ICD-10-CM

## 2019-02-04 RX ORDER — ALPRAZOLAM 1 MG/1
TABLET ORAL
Qty: 60 TAB | Refills: 1 | OUTPATIENT
Start: 2019-02-04 | End: 2019-04-02 | Stop reason: SDUPTHER

## 2019-04-02 DIAGNOSIS — F41.9 ANXIETY: ICD-10-CM

## 2019-04-03 RX ORDER — ALPRAZOLAM 1 MG/1
TABLET ORAL
Qty: 60 TAB | Refills: 1 | OUTPATIENT
Start: 2019-04-03 | End: 2019-05-31 | Stop reason: SDUPTHER

## 2019-04-23 ENCOUNTER — HOSPITAL ENCOUNTER (OUTPATIENT)
Dept: LAB | Age: 69
Discharge: HOME | End: 2019-04-23
Payer: MEDICARE

## 2019-04-23 ENCOUNTER — OFFICE VISIT (OUTPATIENT)
Dept: FAMILY MEDICINE CLINIC | Age: 69
End: 2019-04-23

## 2019-04-23 VITALS
DIASTOLIC BLOOD PRESSURE: 78 MMHG | OXYGEN SATURATION: 99 % | HEART RATE: 76 BPM | SYSTOLIC BLOOD PRESSURE: 120 MMHG | WEIGHT: 115.2 LBS | TEMPERATURE: 96.4 F | BODY MASS INDEX: 21.75 KG/M2 | RESPIRATION RATE: 16 BRPM | HEIGHT: 61 IN

## 2019-04-23 DIAGNOSIS — M25.50 ARTHRALGIA OF MULTIPLE JOINTS: ICD-10-CM

## 2019-04-23 DIAGNOSIS — R14.3 PASSING GAS: ICD-10-CM

## 2019-04-23 DIAGNOSIS — E03.4 HYPOTHYROIDISM DUE TO ACQUIRED ATROPHY OF THYROID: Primary | ICD-10-CM

## 2019-04-23 DIAGNOSIS — R14.0 ABDOMINAL BLOATING: ICD-10-CM

## 2019-04-23 DIAGNOSIS — R52 ENCOUNTER FOR PAIN MANAGEMENT: ICD-10-CM

## 2019-04-23 DIAGNOSIS — M18.11 OSTEOARTHRITIS OF RIGHT THUMB: ICD-10-CM

## 2019-04-23 DIAGNOSIS — Z51.81 ENCOUNTER FOR MEDICATION MONITORING: ICD-10-CM

## 2019-04-23 PROCEDURE — 80053 COMPREHEN METABOLIC PANEL: CPT

## 2019-04-23 PROCEDURE — 82570 ASSAY OF URINE CREATININE: CPT

## 2019-04-23 PROCEDURE — 84443 ASSAY THYROID STIM HORMONE: CPT

## 2019-04-23 PROCEDURE — 80307 DRUG TEST PRSMV CHEM ANLYZR: CPT

## 2019-04-23 PROCEDURE — 36415 COLL VENOUS BLD VENIPUNCTURE: CPT

## 2019-04-23 PROCEDURE — 85027 COMPLETE CBC AUTOMATED: CPT

## 2019-04-23 RX ORDER — TRAMADOL HYDROCHLORIDE 50 MG/1
50 TABLET ORAL
Qty: 30 TAB | Refills: 0 | Status: SHIPPED | OUTPATIENT
Start: 2019-04-23 | End: 2019-07-31

## 2019-04-23 RX ORDER — GUAIFENESIN AND DEXTROMETHORPHAN HYDROBROMIDE 1200; 60 MG/1; MG/1
1 TABLET, EXTENDED RELEASE ORAL
COMMUNITY
End: 2020-09-11 | Stop reason: ALTCHOICE

## 2019-04-23 NOTE — PATIENT INSTRUCTIONS
Gas and Bloating: Care Instructions Your Care Instructions Gas and bloating can be uncomfortable and embarrassing problems. All people pass gas, but some people produce more gas than others, sometimes enough to cause distress. It is normal to pass gas from 6 to 20 times per day. Excess gas usually is not caused by a serious health problem. Gas and bloating usually are caused by something you eat or drink, including some food supplements and medicines. Gas and bloating are usually harmless and go away without treatment. However, changing your diet can help end the problem. Some over-the-counter medicines can help prevent gas and relieve bloating. Follow-up care is a key part of your treatment and safety. Be sure to make and go to all appointments, and call your doctor if you are having problems. It's also a good idea to know your test results and keep a list of the medicines you take. How can you care for yourself at home? · Keep a food diary if you think a food gives you gas. Write down what you eat or drink. Also record when you get gas. If you notice that a food seems to cause your gas each time, avoid it and see if the gas goes away. Examples of foods that cause gas include: ? Fried and fatty foods. ? Beans. ? Vegetables such as artichokes, asparagus, broccoli, brussels sprouts, cabbage, cauliflower, cucumbers, green peppers, onions, peas, radishes, and raw potatoes. ? Fruits such as apricots, bananas, melons, peaches, pears, prunes, and raw apples. ? Wheat and wheat bran. · Soak dry beans in water overnight, then dump the water and cook the soaked beans in new water. This can help prevent gas and bloating. · If you have problems with lactose, avoid dairy products such as milk and cheese. · Try not to swallow air. Do not drink through a straw, gulp your food, or chew gum. · Take an over-the-counter medicine. Read and follow all instructions on the label. ? Food enzymes, such as Beano, can be added to gas-producing foods to prevent gas. ? Antacids, such as Maalox Anti-Gas and Mylanta Gas, can relieve bloating by making you burp. Be careful when you take over-the-counter antacid medicines. Many of these medicines have aspirin in them. Read the label to make sure that you are not taking more than the recommended dose. Too much aspirin can be harmful. ? Activated charcoal tablets, such as CharcoCaps, may decrease odor from gas you pass. ? If you have problems with lactose, you can take medicines such as Dairy Ease and Lactaid with dairy products to prevent gas and bloating. · Get some exercise regularly. When should you call for help? Call 911 anytime you think you may need emergency care. For example, call if: 
  · You have gas and signs of a heart attack, such as: 
? Chest pain or pressure. ? Sweating. ? Shortness of breath. ? Nausea or vomiting. ? Pain that spreads from the chest to the neck, jaw, or one or both shoulders or arms. ? Dizziness or lightheadedness. ? A fast or uneven pulse. After calling 911, chew 1 adult-strength aspirin. Wait for an ambulance. Do not try to drive yourself.  
 Call your doctor now or seek immediate medical care if: 
  · You have severe belly pain.  
  · You have blood in your stool.  
 Watch closely for changes in your health, and be sure to contact your doctor if: 
  · You have blood or pus in your urine.  
  · Your urine is cloudy or smells bad.  
  · You are burping and have trouble swallowing.  
  · You feel bloated and have swelling in your belly.  
  · You do not get better as expected. Where can you learn more? Go to http://jodi-pete.info/. Enter E931 in the search box to learn more about \"Gas and Bloating: Care Instructions. \" Current as of: September 23, 2018 Content Version: 11.9 © 8723-3592 [employer].  Care instructions adapted under license by [address] (which disclaims liability or warranty for this information). If you have questions about a medical condition or this instruction, always ask your healthcare professional. [address] any warranty or liability for your use of this information.

## 2019-04-23 NOTE — LETTER
Brendan Mcleod FNI:[birth year] MR #:[id number] Provider Taryn Maldonado MD  
*RELR-243* BSMG-491 (5/16) Page 1 of 5 Initial [employer] CONTROLLED SUBSTANCE AGREEMENT I may be prescribed medications that are controlled substances as part  of my treatment plan for management of my medical condition(s). The goal of my treatment plan is to maintain and/or improve my health and wellbeing. Because controlled substances have an increased risk of abuse or harm, continual re-evaluation is needed determine if the goals of my treatment plan are being met for my safety and the safety of others. Nori Qiu  am entering into this Controlled Substance Agreement with my provider, Moy Vivar MD at [facility] . I understand that successful treatment requires mutual trust and honesty between me and my provider. I understand that there are state and federal laws and regulations which apply to the medications that my provider may prescribe that must be followed. I understand there are risks and benefits ts of taking the medicines that my provider may prescribe. I understand and agree that following this Agreement is necessary in continuing my provider-patient relationship and success of my treatment plan. As a part of my treatment plan, I agree to the following: COMMUNICATION: 
 
1. I will communicate fully with my provider about my medical condition(s), including the effect on my daily life and how well my medications are helping. I will tell my provider all of the medications that I take for any reason, including medications I receive from another health care provider, and will notify my provider about all issues, problems or concerns, including any side effects, which may be related to my medications. I understand that this information allows my provider to adjust my treatment plan to help manage my medical condition.  I understand that this information will become part of my permanent medical record. 2. I will notify my provider if I have a history of alcohol/drug misuse/addiction or if I have had treatment for alcohol/drug addiction in the past, or if I have a new problem with or concern about alcohol/drug use/addiction, because this increases the likelihood of high risk behaviors and may lead to serious medical conditions. 3. Females Only: I will notify my provider if I am or become pregnant, or if I intend to become pregnant, or if I intend to breastfeed. I understand that communication of these issues with my provider is important, due to possible effects my medication could have on an unborn fetus or breastfeeding child. Ebony Henson JDD:[birth year] MR #:[id number] Provider Obey Baez MD  
*WPCR-777* BSMG-491 (5/16) Page 2 of 5 Initial [employer] MISUSE OF MEDICATIONS / DRUGS: 
 
1. I agree to take all controlled substances as prescribed, and will not misuse or abuse any controlled substances prescribed by my provider. For my safety, I will not increase the amount of medicine I take without first talking with and getting permission from my provider. 2. If I have a medical emergency, another health care provider may prescribe me medication. If I seek emergency treatment, I will notify my provider within seventy-two (72) hours. 3. I understand that my provider may discuss my use and/or possible misuse/abuse of controlled substances and alcohol, as appropriate, with any health care provider involved in my care, pharmacist or legal authority. ILLEGAL DRUGS: 
 
1. I will not use illegal drugs of any kind, including but not limited to marijuana, heroin, cocaine, or any prescription drug which is not prescribed to me. DRUG DIVERSION / PRESCRIPTION FRAUD: 
 
1. I will not share, sell, trade, give away, or otherwise misuse my prescriptions or medications. 2. I will not alter any prescriptions provided to me by my provider. SINGLE PROVIDER: 
 
1. I agree that all controlled substances that I take will be prescribed only by my provider (or his/her covering provider) under this Agreement. This agreement does not prevent me from seeking emergency medical treatment or receiving pain management related to a surgery. PROTECTING MEDICATIONS: 
 
1. I am responsible for keeping my prescriptions and medications in a safe and secure place including safeguarding them from loss or theft. I understand that lost, stolen or damaged/destroyed prescriptions or medications will not be replaced. Bruna Denis ZFP:[birth year] MR #:[id number] Provider Alesha Puri MD  
*TAMN-256* BSMG-491 (5/16) Page 3 of 5 Initial [facility] PRESCRIPTION RENEWALS/REFILLS: 
 
1. I will follow my controlled substance medication schedule as prescribed by my provider. 2. I understand and agree that I will make any requests for renewals or refills of my prescriptions only at the time of an office visit or during my providers regular office hours subject to the prescription refill requirements of the individual practice. 3. I understand that my provider may not call in prescriptions for controlled substances to my pharmacy. 4. I understand that my provider may adjust or discontinue these medications as deemed appropriate for my medical treatment plan. This Agreement does not guarantee the prescription of controlled medications. 5. I agree that if my medications are adjusted or discontinued, I will properly dispose of any remaining medications. I understand that I will be required to dispose of any remaining controlled medications prior to being provided with any prescriptions for other controlled medications.  
 
 
1. I authorize my provider and my pharmacy to cooperate fully with any local, state, or federal law enforcement agency in the investigation of any possible misuse, sale, or other diversion of my controlled substance prescriptions or medications. RISKS: 
 
 
1. I understand that if I do not adhere to this Agreement in any way, my provider may change my prescriptions, stop prescribing controlled substances or end our provider-patient relationship. 2. If my provider decides to stop prescribing medication, or decides to end our provider-patient relationship,my provider may require that I taper my medications slowly. If necessary, my provider may also provide a prescription for other medications to treat my withdrawal symptoms. UNDERSTANDING THIS AGREEMENT: 
 
I understand that my provider may adjust or stop my prescriptions for controlled substances based on my medical condition and my treatment plan. I understand that this Agreement does not guarantee that I will be prescribed medications or controlled substances. I understand that controlled substances may be just one part 
of my treatment plan. My initial on each page and my signature below shows that I have read each page of this Agreement, I have had an opportunity to ask questions, and all of my questions have been answered to my satisfaction by my provider. By signing below, I agree to comply with this Agreement, and I understand that if I do not follow the Agreements listed above, my provider may stop 
 
 
 
_________________________________________  Date/Time 4/23/2019 7:54 AM   
             (Patient Signature)

## 2019-04-23 NOTE — PROGRESS NOTES
Chief Complaint Patient presents with  Thyroid Problem  
  follow up  Cholesterol Problem  
  follow up Mammogram: 5/14/2018 Bone density 5/14/2018 Colonoscopy 3/12/2015 by Dr. Brisa Villalta in 5 yrs Patient states she has an eye 6/2019 Pain contracted updated today 1. Have you been to the ER, urgent care clinic since your last visit? Hospitalized since your last visit? No 
 
2. Have you seen or consulted any other health care providers outside of the [address] [place] [address] since your last visit? Include any pap smears or colon screening.  no

## 2019-04-24 LAB
ALBUMIN SERPL-MCNC: 5.1 G/DL (ref 3.6–4.8)
ALBUMIN/GLOB SERPL: 2.4 {RATIO} (ref 1.2–2.2)
ALP SERPL-CCNC: 71 IU/L (ref 39–117)
ALT SERPL-CCNC: 14 IU/L (ref 0–32)
AST SERPL-CCNC: 18 IU/L (ref 0–40)
BILIRUB SERPL-MCNC: 0.4 MG/DL (ref 0–1.2)
BUN SERPL-MCNC: 21 MG/DL (ref 8–27)
BUN/CREAT SERPL: 21 (ref 12–28)
CALCIUM SERPL-MCNC: 10.3 MG/DL (ref 8.7–10.3)
CHLORIDE SERPL-SCNC: 103 MMOL/L (ref 96–106)
CO2 SERPL-SCNC: 24 MMOL/L (ref 20–29)
CREAT SERPL-MCNC: 0.99 MG/DL (ref 0.57–1)
ERYTHROCYTE [DISTWIDTH] IN BLOOD BY AUTOMATED COUNT: 13.3 % (ref 12.3–15.4)
GLOBULIN SER CALC-MCNC: 2.1 G/DL (ref 1.5–4.5)
GLUCOSE SERPL-MCNC: 83 MG/DL (ref 65–99)
HCT VFR BLD AUTO: 46.1 % (ref 34–46.6)
HGB BLD-MCNC: 15.7 G/DL (ref 11.1–15.9)
INTERPRETATION: NORMAL
MCH RBC QN AUTO: 30.1 PG (ref 26.6–33)
MCHC RBC AUTO-ENTMCNC: 34.1 G/DL (ref 31.5–35.7)
MCV RBC AUTO: 88 FL (ref 79–97)
PLATELET # BLD AUTO: 244 X10E3/UL (ref 150–379)
POTASSIUM SERPL-SCNC: 5.2 MMOL/L (ref 3.5–5.2)
PROT SERPL-MCNC: 7.2 G/DL (ref 6–8.5)
RBC # BLD AUTO: 5.22 X10E6/UL (ref 3.77–5.28)
SODIUM SERPL-SCNC: 144 MMOL/L (ref 134–144)
TSH SERPL DL<=0.005 MIU/L-ACNC: 3.77 UIU/ML (ref 0.45–4.5)
WBC # BLD AUTO: 5.2 X10E3/UL (ref 3.4–10.8)

## 2019-04-25 LAB
AMPHETAMINES UR QL SCN: NEGATIVE NG/ML
BARBITURATES UR QL SCN: NEGATIVE NG/ML
BENZODIAZ UR QL SCN: POSITIVE NG/ML
BZE UR QL SCN: NEGATIVE NG/ML
CANNABINOIDS UR QL SCN: NEGATIVE NG/ML
CREAT UR-MCNC: 18.9 MG/DL (ref 20–300)
METHADONE UR QL SCN: NEGATIVE NG/ML
OPIATES UR QL SCN: NEGATIVE NG/ML
OXYCODONE+OXYMORPHONE UR QL SCN: NEGATIVE NG/ML
PCP UR QL: NEGATIVE NG/ML
PH UR: 6.2 [PH] (ref 4.5–8.9)
PLEASE NOTE:, 733163: ABNORMAL
PROPOXYPH UR QL SCN: NEGATIVE NG/ML
SP GR UR: 1

## 2019-05-01 LAB — DRUGS UR: NORMAL

## 2019-05-17 ENCOUNTER — HOSPITAL ENCOUNTER (OUTPATIENT)
Dept: MAMMOGRAPHY | Age: 69
Discharge: HOME | End: 2019-05-17
Attending: FAMILY MEDICINE
Payer: MEDICARE

## 2019-05-17 DIAGNOSIS — Z12.31 VISIT FOR SCREENING MAMMOGRAM: ICD-10-CM

## 2019-05-17 PROCEDURE — 77067 SCR MAMMO BI INCL CAD: CPT

## 2019-05-31 DIAGNOSIS — F41.9 ANXIETY: ICD-10-CM

## 2019-05-31 RX ORDER — ALPRAZOLAM 1 MG/1
TABLET ORAL
Qty: 60 TAB | Refills: 1 | OUTPATIENT
Start: 2019-05-31 | End: 2019-08-04 | Stop reason: SDUPTHER

## 2019-08-04 DIAGNOSIS — F41.9 ANXIETY: ICD-10-CM

## 2019-08-05 RX ORDER — ALPRAZOLAM 1 MG/1
TABLET ORAL
Qty: 60 TAB | Refills: 1 | OUTPATIENT
Start: 2019-08-05 | End: 2019-10-03 | Stop reason: SDUPTHER

## 2019-08-23 DIAGNOSIS — E03.4 HYPOTHYROIDISM DUE TO ACQUIRED ATROPHY OF THYROID: ICD-10-CM

## 2019-08-23 RX ORDER — LEVOTHYROXINE SODIUM 75 UG/1
TABLET ORAL
Qty: 90 TAB | Refills: 3 | Status: SHIPPED | OUTPATIENT
Start: 2019-08-23 | End: 2020-09-11 | Stop reason: SDUPTHER

## 2019-09-26 NOTE — PROGRESS NOTES
HISTORY OF PRESENT ILLNESS  Venancio Tay is a 71 y.o. female. HPI   Follow up on chronic medical problems. C/o dizzy spells over the past 1-2 months. Feeling lightheaded for periods of time that last for several minutes and then subsides. No nausea or vomiting. No focal sx. No headache noted. Does has some slight congestion over the past few weeks related to allergies. Dizziness seems worse when moving her head suddenly or getting up from sitting or laying down. Feels at time like the room is spinning but then other times just feels lightheaded feeling. No chest pain or SOB. No visual sx. Last eye exam was May of this year. Hypercholesterolemia follow up:  Compliant w/ low fat, low cholesterol diet. Exercising some. Patient fasting today. Thyroid Review:  Patient is seen for followup of hypothyroidism. Thyroid ROS: denies fatigue, heat/cold intolerance, bowel/skin changes or CVS symptoms. Weight is up from last visit. HM:  Mammogram: 5/17/2019   Bone density 5/14/2018  Colonoscopy 3/12/2015 by Dr. Mihir Santiago in 5 yrs   Eye exam 5/31/2019 by Dr. Harleen Pederson for pain management. 1./2. Medical history/Past medical History  Chronic Pain:  Has osteoarthritis in multple joints. Pain is worse in the thumb. C/o pain in the right right thumb from arthritits. Pain in the thumb in particular is severe at times. Using the tramadol for severe pain. Taking NSAIDs upsets her stomach. 3. Applicable records from prior treatment providers are apart of [facility] under the media tab. 4. Diagnostic, therapeutic and laboratory results are available in the [address] [place] [address] chart. 5. Consultation notes are available for review in the media tab of the [address] [place] [address] chart. 6. Treatment goals include pain control so that the pt may be as active and function with her daily activities and improved comfort level. Previously pt has been limited by pain.     7. The risks and benefits of treatment has been discussed at this office visit with the pt. She understands that the medication has addicting potential.  Additionally the pt has been advised that narcotic pain medication may impair mental and/or physical ability required for performance of tasks such as driving or operating any other machinery. 8. Pt has an updated signed pain contract on file and can be found under the FYI section of the [facility] chart. 9. The pain contract is reviewed. Pain medication will be continued at the previous dosage. Urine drug screening will be done today. Diagnostic studies are not indicated at this time. Interventional procedure are not indicated at this time. 10. Medication prescibed is traMADol (ULTRAM) 50 mg tablet.  has been reviewed at this OV by me. 11. Patient instructions have been reviewed in detail as outlined above and in the pain contract. 12. Re-eval is planned for 3 months. Patient Active Problem List   Diagnosis Code    Menopause Z78.0    Hypothyroidism E03.9    Anxiety F41.9    GERD (gastroesophageal reflux disease) K21.9    Lung nodule R91.1    Degenerative arthritis of thumb M18.10       Current Outpatient Medications   Medication Sig Dispense Refill    levothyroxine (SYNTHROID) 75 mcg tablet TAKE 1 TABLET BY MOUTH EVERY DAY BEFORE BREAKFAST 90 Tab 3    ALPRAZolam (XANAX) 1 mg tablet TAKE 1 TABLET BY MOUTH TWICE A DAY AS NEEDED MAX 2 PER DAY 60 Tab 1    dextromethorphan-guaiFENesin (MUCINEX DM) 60-1,200 mg Tb12 Take 1 Tab by mouth daily as needed.  dextromethorphan-guaiFENesin (ROBITUSSIN-DM)  mg/5 mL syrup Take 10 mL by mouth every four (4) hours as needed for Cough.  omeprazole (PRILOSEC) 20 mg capsule TAKE ONE CAPSULE BY MOUTH ONE TIME DAILY 90 Cap 3    black cohosh 40 mg tab Take 1 Tab by mouth daily.  calcium citrate/vitamin D3 (CITRACAL + D PO) Take 1 Tab by mouth two (2) times a day.       loratadine-pseudoephedrine (CLARITIN-D 12 HOUR) 5-120 mg per tablet Take 1 Tab by mouth two (2) times daily as needed.  fluticasone (FLONASE) 50 mcg/actuation nasal spray USE TWO SPRAYS IN EACH NOSTRIL DAILY  16 g 10       Allergies   Allergen Reactions    Codeine Nausea and Vomiting    Mepivacaine Other (comments)     Mepivacaine 3%   Facial swelling    Novocain [Procaine] Swelling     Facial swelling         Past Medical History:   Diagnosis Date    Anxiety 4/15/2010    Environmental allergies     GERD (gastroesophageal reflux disease)     Hypothyroidism 4/15/2010    Lung nodule     Menopause 4/15/2010    Pneumonia     Thyroid disease          Past Surgical History:   Procedure Laterality Date    HX APPENDECTOMY      HX HYSTERECTOMY      NV COLONOSCOPY FLX DX W/COLLJ SPEC WHEN PFRMD  2010    nawaf         Family History   Problem Relation Age of Onset    Lung Disease Mother         emphysema    Hypertension Mother     Cancer Father         colon     Alzheimer Brother        Social History     Tobacco Use    Smoking status: Former Smoker     Last attempt to quit: 2000     Years since quittin.7    Smokeless tobacco: Never Used   Substance Use Topics    Alcohol use:  Yes     Alcohol/week: 0.0 standard drinks     Comment: occasional          Lab Results   Component Value Date/Time    WBC 5.2 2019 08:30 AM    HGB 15.7 2019 08:30 AM    HCT 46.1 2019 08:30 AM    PLATELET 017  08:30 AM    MCV 88 2019 08:30 AM     Lab Results   Component Value Date/Time    Cholesterol, total 216 (H) 2018 10:15 AM    HDL Cholesterol 92 2018 10:15 AM    LDL, calculated 112 (H) 2018 10:15 AM    Triglyceride 58 2018 10:15 AM    CHOL/HDL Ratio 2.3 2009 04:08 PM     Lab Results   Component Value Date/Time    TSH 3.770 2019 08:30 AM    T4, Free 1.56 2015 08:13 AM      Lab Results   Component Value Date/Time    Sodium 144 2019 08:30 AM    Potassium 5.2 2019 08:30 AM Chloride 103 04/23/2019 08:30 AM    CO2 24 04/23/2019 08:30 AM    Anion gap 6 10/07/2012 11:30 AM    Glucose 83 04/23/2019 08:30 AM    BUN 21 04/23/2019 08:30 AM    Creatinine 0.99 04/23/2019 08:30 AM    BUN/Creatinine ratio 21 04/23/2019 08:30 AM    GFR est AA 67 04/23/2019 08:30 AM    GFR est non-AA 58 (L) 04/23/2019 08:30 AM    Calcium 10.3 04/23/2019 08:30 AM    Bilirubin, total 0.4 04/23/2019 08:30 AM    ALT (SGPT) 14 04/23/2019 08:30 AM    AST (SGOT) 18 04/23/2019 08:30 AM    Alk. phosphatase 71 04/23/2019 08:30 AM    Protein, total 7.2 04/23/2019 08:30 AM    Albumin 5.1 (H) 04/23/2019 08:30 AM    Globulin 3.7 10/07/2012 11:30 AM    A-G Ratio 2.4 (H) 04/23/2019 08:30 AM      Lab Results   Component Value Date/Time    Hemoglobin A1c 5.3 04/20/2012 10:34 AM    Hemoglobin A1c (POC) 4.9 05/14/2018 10:15 AM         Review of Systems   Constitutional: Negative for malaise/fatigue. HENT: Negative for congestion. Eyes: Negative for blurred vision. Respiratory: Negative for cough and shortness of breath. Cardiovascular: Negative for chest pain, palpitations and leg swelling. Gastrointestinal: Negative for abdominal pain, constipation and heartburn. Genitourinary: Negative for dysuria, frequency and urgency. Musculoskeletal: Positive for back pain and joint pain. Skin: Positive for itching and rash. Rash on the forearms that has comes up over the past several weeks. Has some itching. Has been using OTC cortisone cream which has not helped. Has some discoloration noted also. Neurological: Negative for tingling, speech change, focal weakness, weakness and headaches. Endo/Heme/Allergies: Negative for environmental allergies. Psychiatric/Behavioral: Negative for depression. The patient does not have insomnia. Physical Exam   Constitutional: She appears well-developed and well-nourished.    /82 (BP 1 Location: Right arm, BP Patient Position: Sitting)   Pulse 70   Temp 96.9 °F (36.1 °C) (Oral)   Resp 16   Ht 5' 1\" (1.549 m)   Wt 112 lb 3.2 oz (50.9 kg)   LMP 01/01/1983   SpO2 97%   BMI 21.20 kg/m²         HENT:   Right Ear: Tympanic membrane and ear canal normal.   Left Ear: Tympanic membrane and ear canal normal.   Nose: No mucosal edema or rhinorrhea. Mouth/Throat: Oropharynx is clear and moist and mucous membranes are normal.   Neck: Normal range of motion. Neck supple. No thyromegaly present. Cardiovascular: Normal rate, regular rhythm and normal heart sounds. Exam reveals no gallop. Pulmonary/Chest: Effort normal and breath sounds normal. Right breast exhibits tenderness (upper outer quandrant of the breast). Abdominal: Soft. Normal appearance and bowel sounds are normal. She exhibits no mass. There is no tenderness. Genitourinary: Rectal exam shows guaiac negative stool. Musculoskeletal: Normal range of motion. She exhibits no edema. Lumbar back: She exhibits tenderness and bony tenderness. She exhibits normal range of motion. Has right thumb tenderness at the MCP. Lymphadenopathy:     She has no cervical adenopathy. Neurological:   Neurological Exam: alert, oriented, normal speech, no focal findings or movement disorder noted, neck supple without rigidity, cranial nerves II through XII intact, DTR's normal and symmetric, Babinski sign negative, motor and sensory grossly normal bilaterally, normal muscle tone, no tremors, strength 5/5, Romberg sign negative, normal gait and station. Skin: Skin is warm and dry. Dry scaly skin patches with hypopigmentation noted around the rash. Psychiatric: She has a normal mood and affect. Nursing note and vitals reviewed. BP lying   160/84      P 70  BP sitting 172/91  P 77  BP standing 155/88    P- 75       ASSESSMENT and PLAN  Diagnoses and all orders for this visit:    1.  Medicare annual wellness visit, subsequent  -     AMB POC URINALYSIS DIP STICK AUTO W/ MICRO  -     AMB POC FECAL BLOOD, OCCULT, QL 1 CARD    2. Dizziness  Appears to most likely related to her BP elevation.    -     AMB POC EKG ROUTINE W/ 12 LEADS, INTER & REP  -  WNL    3. Blood pressure elevated without history of HTN  Likely causing her dizziness at this point.   -     amLODIPine (NORVASC) 2.5 mg tablet; Take 1 Tab by mouth daily. For high blood pressure. Discussed sodium restriction, high k rich diet, maintaining ideal body weight and regular exercise program such as daily walking 30 min perday 4-5 times per week, as physiologic means to achieve blood pressure control.  Medication compliance advised. 4. Hypothyroidism due to acquired atrophy of thyroid  -     TSH 3RD GENERATION    5. Hypercholesterolemia  -     LIPID PANEL    6. Anxiety  Stable     7. Encounter for medication monitoring    8. Encounter for screening fecal occult blood testing  -     AMB POC FECAL BLOOD, OCCULT, QL 1 CARD    9. Arthralgia of multiple joints  -     traMADol (ULTRAM) 50 mg tablet; Take 1 Tab by mouth every six (6) hours as needed for Pain for up to 99 days. Max Daily Amount: 200 mg. 10. Osteoarthritis of right thumb  -     traMADol (ULTRAM) 50 mg tablet; Take 1 Tab by mouth every six (6) hours as needed for Pain for up to 99 days. Max Daily Amount: 200 mg. 11. Encounter for chronic pain management  -     9-DRUG SCREEN + OXY, UR  -     COMPLIANCE DRUG SCREEN/PRESCRIPTION MONITORING  The pt has signed medication agreement. Pain contract is reviewed. Pain medications will be continued at the previous dosage. Urine drug screening will be done today. Diagnostic  studies are not indicated at this time. Interventional procedure are not indicated at this time. Re-eval in 3 months. 12. Rash  -     REFERRAL TO DERMATOLOGY  She should stop the HC cream for now as this may be causing the hypopigmentation. 13. Pain of right breast  -     US BREAST RT COMPLETE 4 QUAD; Future  -     [facility] 3D COLBY W MAMMO RT DX INCL CAD;  Future  Reduce caffeine. Add vitamin E daily. Follow-up and Dispositions    · Return in about 2 weeks (around 10/11/2019). reviewed diet, exercise and weight control  cardiovascular risk and specific lipid/LDL goals reviewed  reviewed medications and side effects in detail    I have discussed diagnosis listed in this note with pt and/or family. I have discussed treatment plans and options and the risk/benefit analysis of those options, including safe use of medications and possible medication side effects. Through the use of shared decision making we have agreed to the above plan. The patient has received an after-visit summary and questions were answered concerning future plans and follow up. Advise pt of any urgent changes then to proceed to the ER.

## 2019-09-27 ENCOUNTER — HOSPITAL ENCOUNTER (OUTPATIENT)
Dept: LAB | Age: 69
Discharge: HOME | End: 2019-09-27
Payer: MEDICARE

## 2019-09-27 ENCOUNTER — OFFICE VISIT (OUTPATIENT)
Dept: FAMILY MEDICINE CLINIC | Age: 69
End: 2019-09-27

## 2019-09-27 VITALS
OXYGEN SATURATION: 97 % | RESPIRATION RATE: 16 BRPM | TEMPERATURE: 96.9 F | SYSTOLIC BLOOD PRESSURE: 140 MMHG | HEIGHT: 61 IN | BODY MASS INDEX: 21.18 KG/M2 | DIASTOLIC BLOOD PRESSURE: 82 MMHG | WEIGHT: 112.2 LBS | HEART RATE: 70 BPM

## 2019-09-27 DIAGNOSIS — G89.29 ENCOUNTER FOR CHRONIC PAIN MANAGEMENT: ICD-10-CM

## 2019-09-27 DIAGNOSIS — N64.4 PAIN OF RIGHT BREAST: ICD-10-CM

## 2019-09-27 DIAGNOSIS — Z00.00 MEDICARE ANNUAL WELLNESS VISIT, SUBSEQUENT: Primary | ICD-10-CM

## 2019-09-27 DIAGNOSIS — Z12.11 ENCOUNTER FOR SCREENING FECAL OCCULT BLOOD TESTING: ICD-10-CM

## 2019-09-27 DIAGNOSIS — R03.0 BLOOD PRESSURE ELEVATED WITHOUT HISTORY OF HTN: ICD-10-CM

## 2019-09-27 DIAGNOSIS — M25.50 ARTHRALGIA OF MULTIPLE JOINTS: ICD-10-CM

## 2019-09-27 DIAGNOSIS — F41.9 ANXIETY: ICD-10-CM

## 2019-09-27 DIAGNOSIS — E78.00 HYPERCHOLESTEROLEMIA: ICD-10-CM

## 2019-09-27 DIAGNOSIS — E03.4 HYPOTHYROIDISM DUE TO ACQUIRED ATROPHY OF THYROID: ICD-10-CM

## 2019-09-27 DIAGNOSIS — R21 RASH: ICD-10-CM

## 2019-09-27 DIAGNOSIS — R42 DIZZINESS: ICD-10-CM

## 2019-09-27 DIAGNOSIS — Z51.81 ENCOUNTER FOR MEDICATION MONITORING: ICD-10-CM

## 2019-09-27 DIAGNOSIS — M18.11 OSTEOARTHRITIS OF RIGHT THUMB: ICD-10-CM

## 2019-09-27 LAB
BILIRUB UR QL STRIP: NEGATIVE
GLUCOSE UR-MCNC: NEGATIVE MG/DL
KETONES P FAST UR STRIP-MCNC: NEGATIVE MG/DL
PH UR STRIP: 7 [PH] (ref 4.6–8)
PROT UR QL STRIP: NEGATIVE
SP GR UR STRIP: 1.01 (ref 1–1.03)
UA UROBILINOGEN AMB POC: NORMAL (ref 0.2–1)
URINALYSIS CLARITY POC: CLEAR
URINALYSIS COLOR POC: YELLOW
URINE BLOOD POC: NORMAL
URINE LEUKOCYTES POC: NEGATIVE
URINE NITRITES POC: NEGATIVE

## 2019-09-27 PROCEDURE — 82570 ASSAY OF URINE CREATININE: CPT

## 2019-09-27 PROCEDURE — 80307 DRUG TEST PRSMV CHEM ANLYZR: CPT

## 2019-09-27 RX ORDER — TRAMADOL HYDROCHLORIDE 50 MG/1
50 TABLET ORAL
COMMUNITY
End: 2019-09-27 | Stop reason: SDUPTHER

## 2019-09-27 RX ORDER — TRAMADOL HYDROCHLORIDE 50 MG/1
50 TABLET ORAL
Qty: 30 TAB | Refills: 0 | Status: SHIPPED | OUTPATIENT
Start: 2019-09-27 | End: 2020-01-04

## 2019-09-27 RX ORDER — AMLODIPINE BESYLATE 2.5 MG/1
2.5 TABLET ORAL DAILY
Qty: 30 TAB | Refills: 3 | Status: SHIPPED | OUTPATIENT
Start: 2019-09-27 | End: 2019-10-18 | Stop reason: SDUPTHER

## 2019-09-27 NOTE — PROGRESS NOTES
Chief Complaint   Patient presents with   [facility] Annual Wellness Visit     Medicare        BP lying   160/84      P 70    BP sitting 172/91  P 77    BP standing 155/88 P- 75    Mammogram: 5/17/2019    Bone density 5/14/2018    Colonoscopy 3/12/2015 by Dr. Betty Juarez in 5 yrs    Eye exam 5/31/2019 by Dr. Michelle Sanchez      1. Have you been to the ER, urgent care clinic since your last visit? Hospitalized since your last visit? No    2. Have you seen or consulted any other health care providers outside of the [address] [place] [address] since your last visit? Include any pap smears or colon screening.  no

## 2019-09-27 NOTE — PATIENT INSTRUCTIONS
Low Sodium Diet (2,000 Milligram): Care Instructions Your Care Instructions Too much sodium causes your body to hold on to extra water. This can raise your blood pressure and force your heart and kidneys to work harder. In very serious cases, this could cause you to be put in the hospital. It might even be life-threatening. By limiting sodium, you will feel better and lower your risk of serious problems. The most common source of sodium is salt. People get most of the salt in their diet from canned, prepared, and packaged foods. Fast food and restaurant meals also are very high in sodium. Your doctor will probably limit your sodium to less than 2,000 milligrams (mg) a day. This limit counts all the sodium in prepared and packaged foods and any salt you add to your food. Follow-up care is a key part of your treatment and safety. Be sure to make and go to all appointments, and call your doctor if you are having problems. It's also a good idea to know your test results and keep a list of the medicines you take. How can you care for yourself at home? Read food labels · Read labels on cans and food packages. The labels tell you how much sodium is in each serving. Make sure that you look at the serving size. If you eat more than the serving size, you have eaten more sodium. · Food labels also tell you the Percent Daily Value for sodium. Choose products with low Percent Daily Values for sodium. · Be aware that sodium can come in forms other than salt, including monosodium glutamate (MSG), sodium citrate, and sodium bicarbonate (baking soda). MSG is often added to Asian food. When you eat out, you can sometimes ask for food without MSG or added salt. Buy low-sodium foods · Buy foods that are labeled \"unsalted\" (no salt added), \"sodium-free\" (less than 5 mg of sodium per serving), or \"low-sodium\" (less than 140 mg of sodium per serving).  Foods labeled \"reduced-sodium\" and \"light sodium\" may still have too much sodium. Be sure to read the label to see how much sodium you are getting. · Buy fresh vegetables, or frozen vegetables without added sauces. Buy low-sodium versions of canned vegetables, soups, and other canned goods. Prepare low-sodium meals · Cut back on the amount of salt you use in cooking. This will help you adjust to the taste. Do not add salt after cooking. One teaspoon of salt has about 2,300 mg of sodium. · Take the salt shaker off the table. · Flavor your food with garlic, lemon juice, onion, vinegar, herbs, and spices. Do not use soy sauce, lite soy sauce, steak sauce, onion salt, garlic salt, celery salt, mustard, or ketchup on your food. · Use low-sodium salad dressings, sauces, and ketchup. Or make your own salad dressings and sauces without adding salt. · Use less salt (or none) when recipes call for it. You can often use half the salt a recipe calls for without losing flavor. Other foods such as rice, pasta, and grains do not need added salt. · Rinse canned vegetables, and cook them in fresh water. This removes somebut not allof the salt. · Avoid water that is naturally high in sodium or that has been treated with water softeners, which add sodium. Call your local water company to find out the sodium content of your water supply. If you buy bottled water, read the label and choose a sodium-free brand. Avoid high-sodium foods · Avoid eating: 
? Smoked, cured, salted, and canned meat, fish, and poultry. ? Ham, thakkar, hot dogs, and luncheon meats. ? Regular, hard, and processed cheese and regular peanut butter. ? Crackers with salted tops, and other salted snack foods such as pretzels, chips, and salted popcorn. ? Frozen prepared meals, unless labeled low-sodium. ? Canned and dried soups, broths, and bouillon, unless labeled sodium-free or low-sodium. ? Canned vegetables, unless labeled sodium-free or low-sodium. ? [place] fries, pizza, tacos, and other fast foods. ? Pickles, olives, ketchup, and other condiments, especially soy sauce, unless labeled sodium-free or low-sodium. Where can you learn more? Go to http://jodi-pete.info/. Enter P287 in the search box to learn more about \"Low Sodium Diet (2,000 Milligram): Care Instructions. \" Current as of: November 7, 2018 Content Version: 12.2 © 2866-0255 [employer]. Care instructions adapted under license by [employer] (which disclaims liability or warranty for this information). If you have questions about a medical condition or this instruction, always ask your healthcare professional. [address] any warranty or liability for your use of this information.

## 2019-09-28 LAB
CHOLEST SERPL-MCNC: 204 MG/DL (ref 100–199)
HDLC SERPL-MCNC: 90 MG/DL
INTERPRETATION, 910389: NORMAL
LDLC SERPL CALC-MCNC: 101 MG/DL (ref 0–99)
TRIGL SERPL-MCNC: 63 MG/DL (ref 0–149)
TSH SERPL DL<=0.005 MIU/L-ACNC: 1.38 UIU/ML (ref 0.45–4.5)
VLDLC SERPL CALC-MCNC: 13 MG/DL (ref 5–40)

## 2019-10-03 DIAGNOSIS — F41.9 ANXIETY: ICD-10-CM

## 2019-10-03 LAB
AMPHETAMINES UR QL SCN: NEGATIVE NG/ML
BARBITURATES UR QL SCN: NEGATIVE NG/ML
BENZODIAZ UR QL SCN: POSITIVE NG/ML
BZE UR QL SCN: NEGATIVE NG/ML
CANNABINOIDS UR QL SCN: NEGATIVE NG/ML
CREAT UR-MCNC: 28.7 MG/DL (ref 20–300)
DRUGS UR: NORMAL
METHADONE UR QL SCN: NEGATIVE NG/ML
OPIATES UR QL SCN: NEGATIVE NG/ML
OXYCODONE+OXYMORPHONE UR QL SCN: NEGATIVE NG/ML
PCP UR QL: NEGATIVE NG/ML
PH UR: 6.8 [PH] (ref 4.5–8.9)
PLEASE NOTE:, 733163: ABNORMAL
PROPOXYPH UR QL SCN: NEGATIVE NG/ML

## 2019-10-03 RX ORDER — ALPRAZOLAM 1 MG/1
TABLET ORAL
Qty: 60 TAB | Refills: 1 | OUTPATIENT
Start: 2019-10-03 | End: 2019-12-01 | Stop reason: SDUPTHER

## 2019-10-18 ENCOUNTER — OFFICE VISIT (OUTPATIENT)
Dept: FAMILY MEDICINE CLINIC | Age: 69
End: 2019-10-18

## 2019-10-18 VITALS
HEART RATE: 79 BPM | WEIGHT: 112.2 LBS | BODY MASS INDEX: 21.18 KG/M2 | SYSTOLIC BLOOD PRESSURE: 138 MMHG | RESPIRATION RATE: 16 BRPM | OXYGEN SATURATION: 97 % | HEIGHT: 61 IN | TEMPERATURE: 96.4 F | DIASTOLIC BLOOD PRESSURE: 78 MMHG

## 2019-10-18 DIAGNOSIS — I10 ESSENTIAL HYPERTENSION: Primary | ICD-10-CM

## 2019-10-18 RX ORDER — AMLODIPINE BESYLATE 2.5 MG/1
5 TABLET ORAL DAILY
Qty: 60 TAB | Refills: 6 | Status: SHIPPED | OUTPATIENT
Start: 2019-10-18 | End: 2020-06-03 | Stop reason: SDUPTHER

## 2019-10-18 NOTE — PROGRESS NOTES
Chief Complaint   Patient presents with    Hypertension     2 week follow up    Medication Evaluation     2 week follow up Norvasc 2.5mg 1 tab daily           1. Have you been to the ER, urgent care clinic since your last visit? Hospitalized since your last visit? no    2. Have you seen or consulted any other health care providers outside of the [address] [place] [address] since your last visit? Include any pap smears or colon screening.  no

## 2019-10-18 NOTE — PATIENT INSTRUCTIONS
Low Sodium Diet (2,000 Milligram): Care Instructions Your Care Instructions Too much sodium causes your body to hold on to extra water. This can raise your blood pressure and force your heart and kidneys to work harder. In very serious cases, this could cause you to be put in the hospital. It might even be life-threatening. By limiting sodium, you will feel better and lower your risk of serious problems. The most common source of sodium is salt. People get most of the salt in their diet from canned, prepared, and packaged foods. Fast food and restaurant meals also are very high in sodium. Your doctor will probably limit your sodium to less than 2,000 milligrams (mg) a day. This limit counts all the sodium in prepared and packaged foods and any salt you add to your food. Follow-up care is a key part of your treatment and safety. Be sure to make and go to all appointments, and call your doctor if you are having problems. It's also a good idea to know your test results and keep a list of the medicines you take. How can you care for yourself at home? Read food labels · Read labels on cans and food packages. The labels tell you how much sodium is in each serving. Make sure that you look at the serving size. If you eat more than the serving size, you have eaten more sodium. · Food labels also tell you the Percent Daily Value for sodium. Choose products with low Percent Daily Values for sodium. · Be aware that sodium can come in forms other than salt, including monosodium glutamate (MSG), sodium citrate, and sodium bicarbonate (baking soda). MSG is often added to Asian food. When you eat out, you can sometimes ask for food without MSG or added salt. Buy low-sodium foods · Buy foods that are labeled \"unsalted\" (no salt added), \"sodium-free\" (less than 5 mg of sodium per serving), or \"low-sodium\" (less than 140 mg of sodium per serving).  Foods labeled \"reduced-sodium\" and \"light sodium\" may still have too much sodium. Be sure to read the label to see how much sodium you are getting. · Buy fresh vegetables, or frozen vegetables without added sauces. Buy low-sodium versions of canned vegetables, soups, and other canned goods. Prepare low-sodium meals · Cut back on the amount of salt you use in cooking. This will help you adjust to the taste. Do not add salt after cooking. One teaspoon of salt has about 2,300 mg of sodium. · Take the salt shaker off the table. · Flavor your food with garlic, lemon juice, onion, vinegar, herbs, and spices. Do not use soy sauce, lite soy sauce, steak sauce, onion salt, garlic salt, celery salt, mustard, or ketchup on your food. · Use low-sodium salad dressings, sauces, and ketchup. Or make your own salad dressings and sauces without adding salt. · Use less salt (or none) when recipes call for it. You can often use half the salt a recipe calls for without losing flavor. Other foods such as rice, pasta, and grains do not need added salt. · Rinse canned vegetables, and cook them in fresh water. This removes somebut not allof the salt. · Avoid water that is naturally high in sodium or that has been treated with water softeners, which add sodium. Call your local water company to find out the sodium content of your water supply. If you buy bottled water, read the label and choose a sodium-free brand. Avoid high-sodium foods · Avoid eating: 
? Smoked, cured, salted, and canned meat, fish, and poultry. ? Ham, thakkar, hot dogs, and luncheon meats. ? Regular, hard, and processed cheese and regular peanut butter. ? Crackers with salted tops, and other salted snack foods such as pretzels, chips, and salted popcorn. ? Frozen prepared meals, unless labeled low-sodium. ? Canned and dried soups, broths, and bouillon, unless labeled sodium-free or low-sodium. ? Canned vegetables, unless labeled sodium-free or low-sodium. ? [place] fries, pizza, tacos, and other fast foods. ? Pickles, olives, ketchup, and other condiments, especially soy sauce, unless labeled sodium-free or low-sodium. Where can you learn more? Go to http://jodi-pete.info/. Enter X219 in the search box to learn more about \"Low Sodium Diet (2,000 Milligram): Care Instructions. \" Current as of: November 7, 2018 Content Version: 12.2 © 8989-8298 MXP4. Care instructions adapted under license by [employer] (which disclaims liability or warranty for this information). If you have questions about a medical condition or this instruction, always ask your healthcare professional. [address] any warranty or liability for your use of this information. How to Read a Food Label to Limit Sodium: Care Instructions Your Care Instructions Sodium causes your body to hold on to extra water. This can raise your blood pressure and force your heart and kidneys to work harder. In very serious cases, this could cause you to be put in the hospital. It might even be life-threatening. By limiting sodium, you will feel better and lower your risk of serious problems. Processed foods, fast food, and restaurant foods are the major sources of dietary sodium. The most common name for sodium is salt. Try to limit how much sodium you eat to less than 2,300 milligrams (mg) a day. If you limit your sodium to 1,500 mg a day, you can lower your blood pressure even more. This limit counts all the salt that you eat in foods you cook or in packaged foods. Keep a list of everything you eat and drink. Follow-up care is a key part of your treatment and safety. Be sure to make and go to all appointments, and call your doctor if you are having problems. It's also a good idea to know your test results and keep a list of the medicines you take. How can you care for yourself at home? Read ingredient lists on food labels · Read the list of ingredients on food labels to help you find how much sodium is in a food. The label lists the ingredients in a food in descending order (from the most to the least). If salt or sodium is high on the list, there may be a lot of sodium in the food. · Know that sodium has different names. Sodium is also called monosodium glutamate (MSG, common in [facility] food), sodium citrate, sodium alginate, sodium hydroxide, and sodium phosphate. Read Nutrition Facts labels · On most foods, there is a Nutrition Facts label. This will tell you how much sodium is in one serving of food. Look at both the serving size and the sodium amount. The serving size is located at the top of the label, usually right under the \"Nutrition Facts\" title. The amount of sodium is given in the list under the title. It is given in milligrams (mg). ? Check the serving size carefully. A single serving is often very small, and you may eat more than one serving. If this is the case, you will eat more sodium than listed on the label. For example, if the serving size for a canned soup is 1 cup and the sodium amount is 470 mg, if you have 2 cups you will eat 940 mg of sodium. · The nutrition facts for fresh fruits and vegetables are not listed on the food. They may be listed somewhere in the store. These foods usually have no sodium or low sodium. · The Nutrition Facts label also gives you the Percent Daily Value for sodium. This is how much of the recommended amount of sodium a serving contains. The daily value for sodium is less than 2,300 mg. So if the Percent Daily Value says 50%, this means one serving is giving you half of this, or 1,150 mg. Buy low-sodium foods · Look for foods that are made with less sodium. Watch for the following words on the label. ? \"Unsalted\" means there is no sodium added to the food. But there may be sodium already in the food naturally. ? \"Sodium-free\" means a serving has less than 5 mg of sodium. ? \"Very low sodium\" means a serving has 35 mg or less of sodium. ? \"Low-sodium\" means a serving has 140 mg or less of sodium. · \"Reduced-sodium\" means that there is 25% less sodium than what the food normally has. This is still usually too much sodium. Try not to buy foods with this on the label. · Buy fresh vegetables, or frozen vegetables without added sauces. Buy low-sodium versions of canned vegetables, soups, and other canned goods. Where can you learn more? Go to http://jodiSea's Food Cafepete.info/. Enter 26 [id number] in the search box to learn more about \"How to Read a Food Label to Limit Sodium: Care Instructions. \" Current as of: November 7, 2018 Content Version: 12.2 © 0535-7287 [employer]. Care instructions adapted under license by [employer] (which disclaims liability or warranty for this information). If you have questions about a medical condition or this instruction, always ask your healthcare professional. [place] [address] any warranty or liability for your use of this information.

## 2019-10-18 NOTE — PROGRESS NOTES
HISTORY OF PRESENT ILLNESS  Karen Mojica is a 71 y.o. female. HPI   Follow up on starting norvasc for her BP. Overall tolerating medications well. Cardiovascular Review:  The patient has hypertension and hyperlipidemia. Diet and Lifestyle: generally follows a low fat low cholesterol diet, generally follows a low sodium diet, exercises sporadically  Home BP Monitoring: is not measured at home. Pertinent ROS: taking medications as instructed, no medication side effects noted, no TIA's, no chest pain on exertion, no dyspnea on exertion, no swelling of ankles. Patient Active Problem List   Diagnosis Code    Menopause Z78.0    Hypothyroidism E03.9    Anxiety F41.9    GERD (gastroesophageal reflux disease) K21.9    Lung nodule R91.1    Degenerative arthritis of thumb M18.10     Current Outpatient Medications   Medication Sig Dispense Refill    ALPRAZolam (XANAX) 1 mg tablet TAKE 1 TABLET BY MOUTH TWICE A DAY AS NEEDED MAX 2 PER DAY 60 Tab 1    vitamin E acetate (VITAMIN E PO) Take 180 mg by mouth daily.  amLODIPine (NORVASC) 2.5 mg tablet Take 1 Tab by mouth daily. For high blood pressure. 30 Tab 3    traMADol (ULTRAM) 50 mg tablet Take 1 Tab by mouth every six (6) hours as needed for Pain for up to 99 days. Max Daily Amount: 200 mg. 30 Tab 0    levothyroxine (SYNTHROID) 75 mcg tablet TAKE 1 TABLET BY MOUTH EVERY DAY BEFORE BREAKFAST 90 Tab 3    dextromethorphan-guaiFENesin (MUCINEX DM) 60-1,200 mg Tb12 Take 1 Tab by mouth daily as needed.  dextromethorphan-guaiFENesin (ROBITUSSIN-DM)  mg/5 mL syrup Take 10 mL by mouth every four (4) hours as needed for Cough.  omeprazole (PRILOSEC) 20 mg capsule TAKE ONE CAPSULE BY MOUTH ONE TIME DAILY 90 Cap 3    black cohosh 40 mg tab Take 1 Tab by mouth daily.  calcium citrate/vitamin D3 (CITRACAL + D PO) Take 1 Tab by mouth daily.       loratadine-pseudoephedrine (CLARITIN-D 12 HOUR) 5-120 mg per tablet Take 1 Tab by mouth two (2) times daily as needed.  fluticasone (FLONASE) 50 mcg/actuation nasal spray USE TWO SPRAYS IN EACH NOSTRIL DAILY  16 g 10     Past Medical History:   Diagnosis Date    Anxiety 4/15/2010    Environmental allergies     GERD (gastroesophageal reflux disease)     Hypothyroidism 4/15/2010    Lung nodule     Menopause 4/15/2010    Pneumonia     Thyroid disease      Past Surgical History:   Procedure Laterality Date    HX APPENDECTOMY      HX HYSTERECTOMY      [place] COLONOSCOPY FLX DX W/COLLJ SPEC WHEN PFRMD  2010    nawaf     Family History   Problem Relation Age of Onset    Lung Disease Mother         emphysema    Hypertension Mother     Cancer Father         colon     Alzheimer Brother      Social History     Tobacco Use    Smoking status: Former Smoker     Last attempt to quit: 2000     Years since quittin.8    Smokeless tobacco: Never Used   Substance Use Topics    Alcohol use:  Yes     Alcohol/week: 0.0 standard drinks     Comment: occasional      Lab Results   Component Value Date/Time    WBC 5.2 2019 08:30 AM    HGB 15.7 2019 08:30 AM    HCT 46.1 2019 08:30 AM    PLATELET 405  08:30 AM    MCV 88 2019 08:30 AM     Lab Results   Component Value Date/Time    Cholesterol, total 204 (H) 2019 12:16 PM    HDL Cholesterol 90 2019 12:16 PM    LDL, calculated 101 (H) 2019 12:16 PM    Triglyceride 63 2019 12:16 PM    CHOL/HDL Ratio 2.3 2009 04:08 PM     Lab Results   Component Value Date/Time    TSH 1.380 2019 12:16 PM    T4, Free 1.56 2015 08:13 AM      Lab Results   Component Value Date/Time    Sodium 144 2019 08:30 AM    Potassium 5.2 2019 08:30 AM    Chloride 103 2019 08:30 AM    CO2 24 2019 08:30 AM    Anion gap 6 10/07/2012 11:30 AM    Glucose 83 2019 08:30 AM    BUN 21 2019 08:30 AM    Creatinine 0.99 2019 08:30 AM    BUN/Creatinine ratio 21 2019 08:30 AM    GFR est AA 67 04/23/2019 08:30 AM    GFR est non-AA 58 (L) 04/23/2019 08:30 AM    Calcium 10.3 04/23/2019 08:30 AM    Bilirubin, total 0.4 04/23/2019 08:30 AM    ALT (SGPT) 14 04/23/2019 08:30 AM    AST (SGOT) 18 04/23/2019 08:30 AM    Alk. phosphatase 71 04/23/2019 08:30 AM    Protein, total 7.2 04/23/2019 08:30 AM    Albumin 5.1 (H) 04/23/2019 08:30 AM    Globulin 3.7 10/07/2012 11:30 AM    A-G Ratio 2.4 (H) 04/23/2019 08:30 AM      Lab Results   Component Value Date/Time    Hemoglobin A1c 5.3 04/20/2012 10:34 AM    Hemoglobin A1c (POC) 4.9 05/14/2018 10:15 AM         Review of Systems   Constitutional: Negative for malaise/fatigue. HENT: Negative for congestion. Eyes: Negative for blurred vision. Respiratory: Negative for cough and shortness of breath. Cardiovascular: Negative for chest pain, palpitations and leg swelling. Gastrointestinal: Negative for abdominal pain, constipation and heartburn. Genitourinary: Negative for dysuria, frequency and urgency. Musculoskeletal: Negative for back pain and joint pain. Neurological: Negative for dizziness, tingling and headaches. Endo/Heme/Allergies: Negative for environmental allergies. Psychiatric/Behavioral: Negative for depression. The patient does not have insomnia. Physical Exam   Constitutional: She appears well-developed and well-nourished. /78   Pulse 79   Temp 96.4 °F (35.8 °C) (Oral)   Resp 16   Ht 5' 1\" (1.549 m)   Wt 112 lb 3.2 oz (50.9 kg)   LMP 01/01/1983   SpO2 97%   BMI 21.20 kg/m²    HENT:   Right Ear: Tympanic membrane and ear canal normal.   Left Ear: Tympanic membrane and ear canal normal.   Nose: No mucosal edema or rhinorrhea. Mouth/Throat: Oropharynx is clear and moist and mucous membranes are normal.   Neck: Normal range of motion. Neck supple. No thyromegaly present. Cardiovascular: Normal rate, regular rhythm and normal heart sounds. Exam reveals no gallop.    Pulmonary/Chest: Effort normal and breath sounds normal.   Abdominal: Soft. Normal appearance and bowel sounds are normal. She exhibits no mass. There is no tenderness. Musculoskeletal: Normal range of motion. She exhibits no edema. Lymphadenopathy:     She has no cervical adenopathy. Skin: Skin is warm and dry. Psychiatric: She has a normal mood and affect. Nursing note and vitals reviewed. ASSESSMENT and PLAN  Diagnoses and all orders for this visit:    1. Essential hypertension  -    Increase amLODIPine (NORVASC) 2.5 mg tablet; Take 2 Tabs by mouth daily. For high blood pressure. Discussed sodium restriction, high k rich diet, maintaining ideal body weight and regular exercise program such as daily walking 30 min perday 4-5 times per week, as physiologic means to achieve blood pressure control.  Medication compliance advised. Follow-up and Dispositions    · Return in about 4 months (around 2/18/2020). reviewed diet, exercise and weight control  cardiovascular risk and specific lipid/LDL goals reviewed  reviewed medications and side effects in detail    I have discussed diagnosis listed in this note with pt and/or family. I have discussed treatment plans and options and the risk/benefit analysis of those options, including safe use of medications and possible medication side effects. Through the use of shared decision making we have agreed to the above plan. The patient has received an after-visit summary and questions were answered concerning future plans and follow up. Advise pt of any urgent changes then to proceed to the ER.

## 2019-12-01 DIAGNOSIS — F41.9 ANXIETY: ICD-10-CM

## 2019-12-02 RX ORDER — ALPRAZOLAM 1 MG/1
TABLET ORAL
Qty: 60 TAB | Refills: 1 | OUTPATIENT
Start: 2019-12-02 | End: 2020-01-31

## 2019-12-17 ENCOUNTER — TELEPHONE (OUTPATIENT)
Dept: FAMILY MEDICINE CLINIC | Age: 69
End: 2019-12-17

## 2019-12-17 NOTE — TELEPHONE ENCOUNTER
Spoke with patient and advised her that the consigner had left several messages trying to schedule the mammogram and ultrasound that Dr. Brand had ordered. She stated her  was in the hospital at this time and as soon as she got him straight , she would work on herself.

## 2020-01-24 DIAGNOSIS — K21.9 GASTROESOPHAGEAL REFLUX DISEASE, ESOPHAGITIS PRESENCE NOT SPECIFIED: ICD-10-CM

## 2020-01-24 RX ORDER — OMEPRAZOLE 20 MG/1
CAPSULE, DELAYED RELEASE ORAL
Qty: 90 CAP | Refills: 3 | Status: SHIPPED | OUTPATIENT
Start: 2020-01-24 | End: 2021-01-22

## 2020-01-31 DIAGNOSIS — F41.9 ANXIETY: ICD-10-CM

## 2020-01-31 RX ORDER — ALPRAZOLAM 1 MG/1
TABLET ORAL
Qty: 60 TAB | Refills: 1 | OUTPATIENT
Start: 2020-01-31 | End: 2020-03-30 | Stop reason: SDUPTHER

## 2020-02-28 ENCOUNTER — OFFICE VISIT (OUTPATIENT)
Dept: FAMILY MEDICINE CLINIC | Age: 70
End: 2020-02-28

## 2020-02-28 ENCOUNTER — HOSPITAL ENCOUNTER (OUTPATIENT)
Dept: LAB | Age: 70
Discharge: HOME | End: 2020-02-28
Payer: MEDICARE

## 2020-02-28 ENCOUNTER — DOCUMENTATION ONLY (OUTPATIENT)
Dept: FAMILY MEDICINE CLINIC | Age: 70
End: 2020-02-28

## 2020-02-28 VITALS
WEIGHT: 123.4 LBS | HEART RATE: 81 BPM | TEMPERATURE: 96.2 F | HEIGHT: 61 IN | OXYGEN SATURATION: 96 % | DIASTOLIC BLOOD PRESSURE: 80 MMHG | BODY MASS INDEX: 23.3 KG/M2 | RESPIRATION RATE: 16 BRPM | SYSTOLIC BLOOD PRESSURE: 130 MMHG

## 2020-02-28 DIAGNOSIS — Z51.81 ENCOUNTER FOR MEDICATION MONITORING: ICD-10-CM

## 2020-02-28 DIAGNOSIS — Z80.0 FAMILY HX OF COLON CANCER: ICD-10-CM

## 2020-02-28 DIAGNOSIS — E78.00 HYPERCHOLESTEROLEMIA: ICD-10-CM

## 2020-02-28 DIAGNOSIS — Z86.010 HX OF COLONIC POLYP: ICD-10-CM

## 2020-02-28 DIAGNOSIS — E03.4 HYPOTHYROIDISM DUE TO ACQUIRED ATROPHY OF THYROID: ICD-10-CM

## 2020-02-28 DIAGNOSIS — I10 ESSENTIAL HYPERTENSION: Primary | ICD-10-CM

## 2020-02-28 DIAGNOSIS — M25.50 ARTHRALGIA OF MULTIPLE JOINTS: ICD-10-CM

## 2020-02-28 DIAGNOSIS — Z12.11 COLON CANCER SCREENING: ICD-10-CM

## 2020-02-28 DIAGNOSIS — G89.29 ENCOUNTER FOR CHRONIC PAIN MANAGEMENT: ICD-10-CM

## 2020-02-28 PROCEDURE — 36415 COLL VENOUS BLD VENIPUNCTURE: CPT

## 2020-02-28 PROCEDURE — 85027 COMPLETE CBC AUTOMATED: CPT

## 2020-02-28 PROCEDURE — 80061 LIPID PANEL: CPT

## 2020-02-28 PROCEDURE — 80053 COMPREHEN METABOLIC PANEL: CPT

## 2020-02-28 PROCEDURE — 80307 DRUG TEST PRSMV CHEM ANLYZR: CPT

## 2020-02-28 PROCEDURE — 84443 ASSAY THYROID STIM HORMONE: CPT

## 2020-02-28 RX ORDER — TRAMADOL HYDROCHLORIDE 50 MG/1
50 TABLET ORAL
COMMUNITY

## 2020-02-28 RX ORDER — MUPIROCIN 20 MG/G
OINTMENT TOPICAL DAILY
Qty: 22 G | Refills: 1 | Status: SHIPPED | OUTPATIENT
Start: 2020-02-28 | End: 2020-06-26

## 2020-02-28 NOTE — PROGRESS NOTES
Chief Complaint   Patient presents with    Thyroid Problem     follow up    Medication Management     follow up    Hypertension     follow up       Mammogram: 5/17/2019    Bone density 5/14/2018    Colonoscopy 3/12/2015 by Dr. Sylvia Das in 5 yrs    Pain contracted updated today. 1. Have you been to the ER, urgent care clinic since your last visit? Hospitalized since your last visit? No    2. Have you seen or consulted any other health care providers outside of the [address] [place] [address] since your last visit? Include any pap smears or colon screening.  no

## 2020-02-28 NOTE — LETTER
Olinda Anderson PETER:[birth year] MR #:[id number] Provider Lg Matthew MD  
*PSND-544* BSMG-491 (5/16) Page 1 of 5 Initial [employer] CONTROLLED SUBSTANCE AGREEMENT I may be prescribed medications that are controlled substances as part  of my treatment plan for management of my medical condition(s). The goal of my treatment plan is to maintain and/or improve my health and wellbeing. Because controlled substances have an increased risk of abuse or harm, continual re-evaluation is needed determine if the goals of my treatment plan are being met for my safety and the safety of others. Taylor Carmen  am entering into this Controlled Substance Agreement with my provider, Wiley Chris MD at [facility] . I understand that successful treatment requires mutual trust and honesty between me and my provider. I understand that there are state and federal laws and regulations which apply to the medications that my provider may prescribe that must be followed. I understand there are risks and benefits ts of taking the medicines that my provider may prescribe. I understand and agree that following this Agreement is necessary in continuing my provider-patient relationship and success of my treatment plan. As a part of my treatment plan, I agree to the following: COMMUNICATION: 
 
1. I will communicate fully with my provider about my medical condition(s), including the effect on my daily life and how well my medications are helping. I will tell my provider all of the medications that I take for any reason, including medications I receive from another health care provider, and will notify my provider about all issues, problems or concerns, including any side effects, which may be related to my medications. I understand that this information allows my provider to adjust my treatment plan to help manage my medical condition.  I understand that this information will become part of my permanent medical record. 2. I will notify my provider if I have a history of alcohol/drug misuse/addiction or if I have had treatment for alcohol/drug addiction in the past, or if I have a new problem with or concern about alcohol/drug use/addiction, because this increases the likelihood of high risk behaviors and may lead to serious medical conditions. 3. Females Only: I will notify my provider if I am or become pregnant, or if I intend to become pregnant, or if I intend to breastfeed. I understand that communication of these issues with my provider is important, due to possible effects my medication could have on an unborn fetus or breastfeeding child. Michelle Boston [facility]:[birth year] MR #:[id number] Provider Laureen Mcdaniel MD  
*BLGK-968* BSMG-491 (5/16) Page 2 of 5 Initial [employer] MISUSE OF MEDICATIONS / DRUGS: 
 
1. I agree to take all controlled substances as prescribed, and will not misuse or abuse any controlled substances prescribed by my provider. For my safety, I will not increase the amount of medicine I take without first talking with and getting permission from my provider. 2. If I have a medical emergency, another health care provider may prescribe me medication. If I seek emergency treatment, I will notify my provider within seventy-two (72) hours. 3. I understand that my provider may discuss my use and/or possible misuse/abuse of controlled substances and alcohol, as appropriate, with any health care provider involved in my care, pharmacist or legal authority. ILLEGAL DRUGS: 
 
1. I will not use illegal drugs of any kind, including but not limited to marijuana, heroin, cocaine, or any prescription drug which is not prescribed to me. DRUG DIVERSION / PRESCRIPTION FRAUD: 
 
1. I will not share, sell, trade, give away, or otherwise misuse my prescriptions or medications. 2. I will not alter any prescriptions provided to me by my provider. SINGLE PROVIDER: 
 
1. I agree that all controlled substances that I take will be prescribed only by my provider (or his/her covering provider) under this Agreement. This agreement does not prevent me from seeking emergency medical treatment or receiving pain management related to a surgery. PROTECTING MEDICATIONS: 
 
1. I am responsible for keeping my prescriptions and medications in a safe and secure place including safeguarding them from loss or theft. I understand that lost, stolen or damaged/destroyed prescriptions or medications will not be replaced. Cheryl Hoyt TRM:[birth year] MR #:[id number] Provider Dank Kohli MD  
*RTCA-486* BSMG-491 (5/16) Page 3 of 5 Initial [employer] PRESCRIPTION RENEWALS/REFILLS: 
 
1. I will follow my controlled substance medication schedule as prescribed by my provider. 2. I understand and agree that I will make any requests for renewals or refills of my prescriptions only at the time of an office visit or during my providers regular office hours subject to the prescription refill requirements of the individual practice. 3. I understand that my provider may not call in prescriptions for controlled substances to my pharmacy. 4. I understand that my provider may adjust or discontinue these medications as deemed appropriate for my medical treatment plan. This Agreement does not guarantee the prescription of controlled medications. 5. I agree that if my medications are adjusted or discontinued, I will properly dispose of any remaining medications. I understand that I will be required to dispose of any remaining controlled medications prior to being provided with any prescriptions for other controlled medications.  
 
 
1. I authorize my provider and my pharmacy to cooperate fully with any local, state, or federal law enforcement agency in the investigation of any possible misuse, sale, or other diversion of my controlled substance prescriptions or medications. RISKS: 
 
 
1. I understand that if I do not adhere to this Agreement in any way, my provider may change my prescriptions, stop prescribing controlled substances or end our provider-patient relationship. 2. If my provider decides to stop prescribing medication, or decides to end our provider-patient relationship,my provider may require that I taper my medications slowly. If necessary, my provider may also provide a prescription for other medications to treat my withdrawal symptoms. UNDERSTANDING THIS AGREEMENT: 
 
I understand that my provider may adjust or stop my prescriptions for controlled substances based on my medical condition and my treatment plan. I understand that this Agreement does not guarantee that I will be prescribed medications or controlled substances. I understand that controlled substances may be just one part 
of my treatment plan. My initial on each page and my signature below shows that I have read each page of this Agreement, I have had an opportunity to ask questions, and all of my questions have been answered to my satisfaction by my provider. By signing below, I agree to comply with this Agreement, and I understand that if I do not follow the Agreements listed above, my provider may stop 
 
 
 
_________________________________________  Date/Time 2/28/2020 1:44 PM   
             (Patient Signature)

## 2020-02-28 NOTE — PROGRESS NOTES
HISTORY OF PRESENT ILLNESS  Abdullahi Gamino is a 79 y.o. female. HPI   Follow up on chronic medical problems. Overall feeling well. Cardiovascular Review:  The patient has hypertension and hyperlipidemia. Diet and Lifestyle: generally follows a low fat low cholesterol diet, generally follows a low sodium diet, exercises sporadically  Home BP Monitoring: is not measured at home. Pertinent ROS: taking medications as instructed, no medication side effects noted, no TIA's, no chest pain on exertion, no dyspnea on exertion, no swelling of ankles. Hypercholesterolemia follow up:  Compliant w/ low fat, low cholesterol diet. Exercising some. Patient fasting today. Thyroid Review:  Patient is seen for followup of hypothyroidism. Thyroid ROS: denies fatigue, heat/cold intolerance, bowel/skin changes or CVS symptoms. Weight is up from last visit. HM:  Mammogram: 5/17/2019   Bone density 5/14/2018  Colonoscopy 3/12/2015 by Dr. Nadeen Monson in 5 yrs   Eye exam 5/31/2019 by Dr. Ivis Dueñas for pain management. 1./2. Medical history/Past medical History  Chronic Pain:  Has osteoarthritis in multple joints. Pain is worse in the thumb. C/o pain in the right right thumb from arthritits. Pain in the thumb in particular is severe at times. Using the tramadol for severe pain. Taking NSAIDs upsets her stomach. 3. Applicable records from prior treatment providers are apart of [facility] under the media tab. 4. Diagnostic, therapeutic and laboratory results are available in the [facility] chart. 5. Consultation notes are available for review in the media tab of the [facility] chart. 6. Treatment goals include pain control so that the pt may be as active and function with her daily activities and improved comfort level. Previously pt has been limited by pain. 7. The risks and benefits of treatment has been discussed at this office visit with the pt.   She understands that the medication has addicting potential.  Additionally the pt has been advised that narcotic pain medication may impair mental and/or physical ability required for performance of tasks such as driving or operating any other machinery. 8. Pt has an updated signed pain contract on file and can be found under the FYI section of the [facility] chart. 9. The pain contract is reviewed. Pain medication will be continued at the previous dosage. Urine drug screening will be done today. Diagnostic studies are not indicated at this time. Interventional procedure are not indicated at this time. 10. Medication prescibed is traMADol (ULTRAM) 50 mg tablet.  has not been reviewed at this OV by me since no refill was needed. 11. Patient instructions have been reviewed in detail as outlined above and in the pain contract. 12. Re-eval is planned for 3 months. Patient Active Problem List   Diagnosis Code    Menopause Z78.0    Hypothyroidism E03.9    Anxiety F41.9    GERD (gastroesophageal reflux disease) K21.9    Lung nodule R91.1    Degenerative arthritis of thumb M18.10       Current Outpatient Medications   Medication Sig Dispense Refill    traMADol (ULTRAM) 50 mg tablet Take 50 mg by mouth every six (6) hours as needed for Pain.  mupirocin (BACTROBAN) 2 % ointment Apply  to affected area daily. As needed 22 g 1    ALPRAZolam (XANAX) 1 mg tablet TAKE 1 TABLET BY MOUTH TWICE A DAY AS NEEDED MAX 2 TABLETS PER DAY 60 Tab 1    omeprazole (PRILOSEC) 20 mg capsule TAKE 1 CAPSULE BY MOUTH EVERY DAY 90 Cap 3    amLODIPine (NORVASC) 2.5 mg tablet Take 2 Tabs by mouth daily. For high blood pressure. 60 Tab 6    vitamin E acetate (VITAMIN E PO) Take 180 mg by mouth daily.  levothyroxine (SYNTHROID) 75 mcg tablet TAKE 1 TABLET BY MOUTH EVERY DAY BEFORE BREAKFAST 90 Tab 3    dextromethorphan-guaiFENesin (MUCINEX DM) 60-1,200 mg Tb12 Take 1 Tab by mouth daily as needed.       dextromethorphan-guaiFENesin (ROBITUSSIN-DM)  mg/5 mL syrup Take 10 mL by mouth every four (4) hours as needed for Cough.  black cohosh 40 mg tab Take 1 Tab by mouth daily.  calcium citrate/vitamin D3 (CITRACAL + D PO) Take 1 Tab by mouth daily.  loratadine-pseudoephedrine (CLARITIN-D 12 HOUR) 5-120 mg per tablet Take 1 Tab by mouth two (2) times daily as needed.  fluticasone (FLONASE) 50 mcg/actuation nasal spray USE TWO SPRAYS IN EACH NOSTRIL DAILY  16 g 10       Allergies   Allergen Reactions    Codeine Nausea and Vomiting    Mepivacaine Other (comments)     Mepivacaine 3%   Facial swelling    Novocain [Procaine] Swelling     Facial swelling       Past Medical History:   Diagnosis Date    Anxiety 4/15/2010    Environmental allergies     GERD (gastroesophageal reflux disease)     Hypothyroidism 4/15/2010    Lung nodule     Menopause 4/15/2010    Pneumonia     Thyroid disease        Past Surgical History:   Procedure Laterality Date    HX APPENDECTOMY      HX HYSTERECTOMY      [place] COLONOSCOPY FLX DX W/COLLJ SPEC WHEN PFRMD  2010    nawaf       Family History   Problem Relation Age of Onset    Lung Disease Mother         emphysema    Hypertension Mother     Cancer Father         colon     Alzheimer Brother        Social History     Tobacco Use    Smoking status: Former Smoker     Packs/day: 0.50     Years: 30.00     Pack years: 15.00     Last attempt to quit: 2000     Years since quittin.1    Smokeless tobacco: Never Used   Substance Use Topics    Alcohol use:  Yes     Alcohol/week: 0.0 standard drinks     Comment: occasional        Lab Results   Component Value Date/Time    WBC 5.2 2019 08:30 AM    HGB 15.7 2019 08:30 AM    HCT 46.1 2019 08:30 AM    PLATELET 407 1560 08:30 AM    MCV 88 2019 08:30 AM     Lab Results   Component Value Date/Time    Cholesterol, total 204 (H) 2019 12:16 PM    HDL Cholesterol 90 2019 12:16 PM    LDL, calculated 101 (H) 09/27/2019 12:16 PM    Triglyceride 63 09/27/2019 12:16 PM    CHOL/HDL Ratio 2.3 02/17/2009 04:08 PM     Lab Results   Component Value Date/Time    TSH 1.380 09/27/2019 12:16 PM    T4, Free 1.56 12/11/2015 08:13 AM      Lab Results   Component Value Date/Time    Sodium 144 04/23/2019 08:30 AM    Potassium 5.2 04/23/2019 08:30 AM    Chloride 103 04/23/2019 08:30 AM    CO2 24 04/23/2019 08:30 AM    Anion gap 6 10/07/2012 11:30 AM    Glucose 83 04/23/2019 08:30 AM    BUN 21 04/23/2019 08:30 AM    Creatinine 0.99 04/23/2019 08:30 AM    BUN/Creatinine ratio 21 04/23/2019 08:30 AM    GFR est AA 67 04/23/2019 08:30 AM    GFR est non-AA 58 (L) 04/23/2019 08:30 AM    Calcium 10.3 04/23/2019 08:30 AM    Bilirubin, total 0.4 04/23/2019 08:30 AM    ALT (SGPT) 14 04/23/2019 08:30 AM    AST (SGOT) 18 04/23/2019 08:30 AM    Alk. phosphatase 71 04/23/2019 08:30 AM    Protein, total 7.2 04/23/2019 08:30 AM    Albumin 5.1 (H) 04/23/2019 08:30 AM    Globulin 3.7 10/07/2012 11:30 AM    A-G Ratio 2.4 (H) 04/23/2019 08:30 AM      Lab Results   Component Value Date/Time    Hemoglobin A1c 5.3 04/20/2012 10:34 AM    Hemoglobin A1c (POC) 4.9 05/14/2018 10:15 AM         Review of Systems   Constitutional: Negative for malaise/fatigue. HENT: Negative for congestion. Eyes: Negative for blurred vision. Respiratory: Negative for cough and shortness of breath. Cardiovascular: Negative for chest pain, palpitations and leg swelling. Gastrointestinal: Negative for abdominal pain, constipation and heartburn. Genitourinary: Negative for dysuria, frequency and urgency. Musculoskeletal: Negative for back pain and joint pain. Neurological: Negative for dizziness, tingling and headaches. Endo/Heme/Allergies: Negative for environmental allergies. Psychiatric/Behavioral: Negative for depression. The patient does not have insomnia. Physical Exam  Vitals signs and nursing note reviewed.    Constitutional: Appearance: Normal appearance. She is well-developed. Comments: /80   Pulse 81   Temp 96.2 °F (35.7 °C) (Oral)   Resp 16   Ht 5' 1\" (1.549 m)   Wt 123 lb 6.4 oz (56 kg)   LMP 01/01/1983   SpO2 96%   BMI 23.32 kg/m²      HENT:      Right Ear: Tympanic membrane and ear canal normal.      Left Ear: Tympanic membrane and ear canal normal.      Nose: No mucosal edema or rhinorrhea. Neck:      Musculoskeletal: Normal range of motion and neck supple. Thyroid: No thyromegaly. Cardiovascular:      Rate and Rhythm: Normal rate and regular rhythm. Heart sounds: Normal heart sounds. No gallop. Pulmonary:      Effort: Pulmonary effort is normal.      Breath sounds: Normal breath sounds. Abdominal:      General: Bowel sounds are normal.      Palpations: Abdomen is soft. There is no mass. Tenderness: There is no abdominal tenderness. Musculoskeletal: Normal range of motion. General: No swelling. Lymphadenopathy:      Cervical: No cervical adenopathy. Skin:     General: Skin is warm and dry. ASSESSMENT and PLAN  Diagnoses and all orders for this visit:    1. Essential hypertension  Discussed sodium restriction, high k rich diet, maintaining ideal body weight and regular exercise program such as daily walking 30 min perday 4-5 times per week, as physiologic means to achieve blood pressure control.  Medication compliance advised. 2. Hypercholesterolemia  -     LIPID PANEL    3. Hypothyroidism due to acquired atrophy of thyroid  -     TSH 3RD GENERATION    4. Encounter for medication monitoring  -     CBC W/O DIFF  -     METABOLIC PANEL, COMPREHENSIVE    5. Family hx of colon cancer  6. Hx of colonic polyp  7. Colon cancer screening  -     REFERRAL TO GASTROENTEROLOGY    8. Arthralgia of multiple joints//  9.  Encounter for chronic pain management  -     MONITOR SCREEN 10-DRUG CLASS PROFILE  No refill needed today      Follow-up and Dispositions    · Return in about 6 months (around 9/8/2020). reviewed diet, exercise and weight control  cardiovascular risk and specific lipid/LDL goals reviewed  reviewed medications and side effects in detail    I have discussed diagnosis listed in this note with pt and/or family. I have discussed treatment plans and options and the risk/benefit analysis of those options, including safe use of medications and possible medication side effects. Through the use of shared decision making we have agreed to the above plan. The patient has received an after-visit summary and questions were answered concerning future plans and follow up. Advise pt of any urgent changes then to proceed to the ER.

## 2020-02-28 NOTE — PROGRESS NOTES
Made an appointment with Dr Flaquita Horne on April 2 nd at 2:30 pm      [address]  [address]  [place] [address]

## 2020-02-29 LAB
ALBUMIN SERPL-MCNC: 5.3 G/DL (ref 3.8–4.8)
ALBUMIN/GLOB SERPL: 2.8 {RATIO} (ref 1.2–2.2)
ALP SERPL-CCNC: 74 IU/L (ref 39–117)
ALT SERPL-CCNC: 18 IU/L (ref 0–32)
AMPHETAMINES UR QL SCN: NEGATIVE NG/ML
AST SERPL-CCNC: 18 IU/L (ref 0–40)
BARBITURATES UR QL SCN: NEGATIVE NG/ML
BENZODIAZ UR QL SCN: POSITIVE NG/ML
BILIRUB SERPL-MCNC: 0.5 MG/DL (ref 0–1.2)
BUN SERPL-MCNC: 15 MG/DL (ref 8–27)
BUN/CREAT SERPL: 19 (ref 12–28)
BZE UR QL SCN: NEGATIVE NG/ML
CALCIUM SERPL-MCNC: 10.3 MG/DL (ref 8.7–10.3)
CANNABINOIDS UR QL SCN: NEGATIVE NG/ML
CHLORIDE SERPL-SCNC: 100 MMOL/L (ref 96–106)
CHOLEST SERPL-MCNC: 210 MG/DL (ref 100–199)
CO2 SERPL-SCNC: 27 MMOL/L (ref 20–29)
CREAT SERPL-MCNC: 0.8 MG/DL (ref 0.57–1)
CREAT UR-MCNC: 87.7 MG/DL (ref 20–300)
ERYTHROCYTE [DISTWIDTH] IN BLOOD BY AUTOMATED COUNT: 12.2 % (ref 11.7–15.4)
GLOBULIN SER CALC-MCNC: 1.9 G/DL (ref 1.5–4.5)
GLUCOSE SERPL-MCNC: 83 MG/DL (ref 65–99)
HCT VFR BLD AUTO: 42.6 % (ref 34–46.6)
HDLC SERPL-MCNC: 90 MG/DL
HGB BLD-MCNC: 14.9 G/DL (ref 11.1–15.9)
INTERPRETATION, 910389: NORMAL
LDLC SERPL CALC-MCNC: 110 MG/DL (ref 0–99)
MCH RBC QN AUTO: 30.7 PG (ref 26.6–33)
MCHC RBC AUTO-ENTMCNC: 35 G/DL (ref 31.5–35.7)
MCV RBC AUTO: 88 FL (ref 79–97)
METHADONE UR QL SCN: NEGATIVE NG/ML
OPIATES UR QL SCN: NEGATIVE NG/ML
OXYCODONE+OXYMORPHONE UR QL SCN: NEGATIVE NG/ML
PCP UR QL: NEGATIVE NG/ML
PH UR: 6 [PH] (ref 4.5–8.9)
PLATELET # BLD AUTO: 270 X10E3/UL (ref 150–450)
PLEASE NOTE:, 733163: ABNORMAL
POTASSIUM SERPL-SCNC: 4.6 MMOL/L (ref 3.5–5.2)
PROPOXYPH UR QL SCN: NEGATIVE NG/ML
PROT SERPL-MCNC: 7.2 G/DL (ref 6–8.5)
RBC # BLD AUTO: 4.86 X10E6/UL (ref 3.77–5.28)
SODIUM SERPL-SCNC: 142 MMOL/L (ref 134–144)
TRIGL SERPL-MCNC: 49 MG/DL (ref 0–149)
TSH SERPL DL<=0.005 MIU/L-ACNC: 1.32 UIU/ML (ref 0.45–4.5)
VLDLC SERPL CALC-MCNC: 10 MG/DL (ref 5–40)
WBC # BLD AUTO: 5.1 X10E3/UL (ref 3.4–10.8)

## 2020-03-13 RX ORDER — BENZONATATE 100 MG/1
100 CAPSULE ORAL
Qty: 30 CAP | Refills: 0 | OUTPATIENT
Start: 2020-03-13 | End: 2020-03-20

## 2020-03-13 RX ORDER — PROMETHAZINE HYDROCHLORIDE AND DEXTROMETHORPHAN HYDROBROMIDE 6.25; 15 MG/5ML; MG/5ML
5 SYRUP ORAL
Qty: 180 ML | Refills: 0 | Status: SHIPPED | OUTPATIENT
Start: 2020-03-13 | End: 2020-09-11 | Stop reason: ALTCHOICE

## 2020-03-13 RX ORDER — AMOXICILLIN 500 MG/1
500 CAPSULE ORAL 3 TIMES DAILY
Qty: 30 CAP | Refills: 0 | Status: SHIPPED | OUTPATIENT
Start: 2020-03-13 | End: 2020-03-23

## 2020-03-13 NOTE — TELEPHONE ENCOUNTER
----- Message from Arsh Murillo sent at 3/13/2020  9:14 AM EDT -----  Regarding: Dr. Lindsey Ore: [phone]  General Message/Vendor Calls    Caller's first and last name:Donnie Mercedes/      Reason for call:pt has a  cough, congestion and sore throat and would like to have something phoned in to the [facility],already on file.       Callback required yes/no and why:yes      Best contact number(s):[phone]      Details to clarify the request:      Arsh Murillo

## 2020-03-13 NOTE — TELEPHONE ENCOUNTER
Patient states she has been coughing, sore throat , aching from cough, no fever since Sunday. Has tried Mucinex, Robitussin DM, not helping. Requesting antibiotic and cough medicine.

## 2020-03-13 NOTE — TELEPHONE ENCOUNTER
Screening done, not traveled outside of [place] or been in close contact with anyone that has. Patient notified that scripts have been sent.

## 2020-03-30 DIAGNOSIS — F41.9 ANXIETY: ICD-10-CM

## 2020-03-30 RX ORDER — ALPRAZOLAM 1 MG/1
TABLET ORAL
Qty: 60 TAB | Refills: 1 | OUTPATIENT
Start: 2020-03-30 | End: 2020-06-01

## 2020-03-30 NOTE — TELEPHONE ENCOUNTER
Last visit:2/28/20  Next visit:9/11/20  Previous refill 1/31/20(30+0R)  Please check  before prescribing new Rx.      Thanks,  Dora    Requested Prescriptions     Pending Prescriptions Disp Refills    ALPRAZolam (XANAX) 1 mg tablet 60 Tab 1     Sig: TAKE 1 TABLET BY MOUTH TWICE A DAY AS NEEDED MAX 2 TABLETS PER DAY

## 2020-05-31 DIAGNOSIS — F41.9 ANXIETY: ICD-10-CM

## 2020-06-01 RX ORDER — ALPRAZOLAM 1 MG/1
TABLET ORAL
Qty: 60 TAB | Refills: 1 | Status: SHIPPED | OUTPATIENT
Start: 2020-06-01 | End: 2020-07-28

## 2020-06-03 DIAGNOSIS — I10 ESSENTIAL HYPERTENSION: ICD-10-CM

## 2020-06-03 RX ORDER — AMLODIPINE BESYLATE 2.5 MG/1
5 TABLET ORAL DAILY
Qty: 60 TAB | Refills: 6 | Status: SHIPPED | OUTPATIENT
Start: 2020-06-03 | End: 2021-01-08

## 2020-06-03 NOTE — TELEPHONE ENCOUNTER
Last visit:2/28/20  Next visit:9/11/20  Previous refill 10/18/19(60+6R)    Requested Prescriptions     Pending Prescriptions Disp Refills    amLODIPine (NORVASC) 2.5 mg tablet 60 Tab 6     Sig: Take 2 Tabs by mouth daily. For high blood pressure.

## 2020-06-09 DIAGNOSIS — M85.80 OSTEOPENIA, UNSPECIFIED LOCATION: Primary | ICD-10-CM

## 2020-07-24 ENCOUNTER — HOSPITAL ENCOUNTER (OUTPATIENT)
Dept: BONE DENSITY | Age: 70
Discharge: HOME | End: 2020-07-24
Attending: FAMILY MEDICINE
Payer: MEDICARE

## 2020-07-24 ENCOUNTER — HOSPITAL ENCOUNTER (OUTPATIENT)
Dept: MAMMOGRAPHY | Age: 70
Discharge: HOME | End: 2020-07-24
Attending: FAMILY MEDICINE
Payer: MEDICARE

## 2020-07-24 DIAGNOSIS — Z12.31 VISIT FOR SCREENING MAMMOGRAM: ICD-10-CM

## 2020-07-24 DIAGNOSIS — M85.80 OSTEOPENIA, UNSPECIFIED LOCATION: ICD-10-CM

## 2020-07-24 DIAGNOSIS — Z78.0 POSTMENOPAUSE: ICD-10-CM

## 2020-07-24 PROCEDURE — 77080 DXA BONE DENSITY AXIAL: CPT

## 2020-07-24 PROCEDURE — 77067 SCR MAMMO BI INCL CAD: CPT

## 2020-07-28 DIAGNOSIS — F41.9 ANXIETY: ICD-10-CM

## 2020-07-28 RX ORDER — MUPIROCIN 20 MG/G
OINTMENT TOPICAL DAILY
Qty: 22 G | Refills: 0 | Status: SHIPPED | OUTPATIENT
Start: 2020-07-28 | End: 2020-08-20

## 2020-07-28 RX ORDER — ALPRAZOLAM 1 MG/1
TABLET ORAL
Qty: 60 TAB | Refills: 1 | Status: SHIPPED | OUTPATIENT
Start: 2020-07-28 | End: 2020-10-02

## 2020-08-20 RX ORDER — MUPIROCIN 20 MG/G
OINTMENT TOPICAL DAILY
Qty: 22 G | Refills: 0 | Status: SHIPPED | OUTPATIENT
Start: 2020-08-20 | End: 2020-09-11

## 2020-09-11 ENCOUNTER — OFFICE VISIT (OUTPATIENT)
Dept: FAMILY MEDICINE CLINIC | Age: 70
End: 2020-09-11
Payer: MEDICARE

## 2020-09-11 VITALS
TEMPERATURE: 97.3 F | BODY MASS INDEX: 22.71 KG/M2 | SYSTOLIC BLOOD PRESSURE: 128 MMHG | WEIGHT: 123.4 LBS | HEART RATE: 71 BPM | RESPIRATION RATE: 18 BRPM | OXYGEN SATURATION: 98 % | DIASTOLIC BLOOD PRESSURE: 78 MMHG | HEIGHT: 62 IN

## 2020-09-11 DIAGNOSIS — K21.9 GASTROESOPHAGEAL REFLUX DISEASE, ESOPHAGITIS PRESENCE NOT SPECIFIED: ICD-10-CM

## 2020-09-11 DIAGNOSIS — F41.9 ANXIETY: ICD-10-CM

## 2020-09-11 DIAGNOSIS — Z00.00 MEDICARE ANNUAL WELLNESS VISIT, SUBSEQUENT: Primary | ICD-10-CM

## 2020-09-11 DIAGNOSIS — E03.4 HYPOTHYROIDISM DUE TO ACQUIRED ATROPHY OF THYROID: ICD-10-CM

## 2020-09-11 DIAGNOSIS — I10 ESSENTIAL HYPERTENSION: ICD-10-CM

## 2020-09-11 DIAGNOSIS — Z51.81 ENCOUNTER FOR MEDICATION MONITORING: ICD-10-CM

## 2020-09-11 DIAGNOSIS — E78.00 HYPERCHOLESTEROLEMIA: ICD-10-CM

## 2020-09-11 DIAGNOSIS — M81.0 AGE-RELATED OSTEOPOROSIS WITHOUT CURRENT PATHOLOGICAL FRACTURE: ICD-10-CM

## 2020-09-11 DIAGNOSIS — G89.29 ENCOUNTER FOR CHRONIC PAIN MANAGEMENT: ICD-10-CM

## 2020-09-11 PROCEDURE — G0444 DEPRESSION SCREEN ANNUAL: HCPCS | Performed by: FAMILY MEDICINE

## 2020-09-11 PROCEDURE — G0463 HOSPITAL OUTPT CLINIC VISIT: HCPCS | Performed by: FAMILY MEDICINE

## 2020-09-11 PROCEDURE — G8510 SCR DEP NEG, NO PLAN REQD: HCPCS | Performed by: FAMILY MEDICINE

## 2020-09-11 PROCEDURE — 1090F PRES/ABSN URINE INCON ASSESS: CPT | Performed by: FAMILY MEDICINE

## 2020-09-11 PROCEDURE — G8754 DIAS BP LESS 90: HCPCS | Performed by: FAMILY MEDICINE

## 2020-09-11 PROCEDURE — G8752 SYS BP LESS 140: HCPCS | Performed by: FAMILY MEDICINE

## 2020-09-11 PROCEDURE — 99214 OFFICE O/P EST MOD 30 MIN: CPT | Performed by: FAMILY MEDICINE

## 2020-09-11 PROCEDURE — G8420 CALC BMI NORM PARAMETERS: HCPCS | Performed by: FAMILY MEDICINE

## 2020-09-11 PROCEDURE — G8536 NO DOC ELDER MAL SCRN: HCPCS | Performed by: FAMILY MEDICINE

## 2020-09-11 PROCEDURE — G0439 PPPS, SUBSEQ VISIT: HCPCS | Performed by: FAMILY MEDICINE

## 2020-09-11 PROCEDURE — G8427 DOCREV CUR MEDS BY ELIG CLIN: HCPCS | Performed by: FAMILY MEDICINE

## 2020-09-11 PROCEDURE — G9899 SCRN MAM PERF RSLTS DOC: HCPCS | Performed by: FAMILY MEDICINE

## 2020-09-11 PROCEDURE — 1101F PT FALLS ASSESS-DOCD LE1/YR: CPT | Performed by: FAMILY MEDICINE

## 2020-09-11 PROCEDURE — 3017F COLORECTAL CA SCREEN DOC REV: CPT | Performed by: FAMILY MEDICINE

## 2020-09-11 RX ORDER — CALCIUM CARBONATE/VITAMIN D3 600 MG-125
1 TABLET ORAL DAILY
COMMUNITY

## 2020-09-11 RX ORDER — MUPIROCIN 20 MG/G
OINTMENT TOPICAL DAILY
Qty: 22 G | Refills: 0 | Status: SHIPPED | OUTPATIENT
Start: 2020-09-11 | End: 2020-11-06

## 2020-09-11 RX ORDER — LEVOTHYROXINE SODIUM 75 UG/1
75 TABLET ORAL
Qty: 90 TAB | Refills: 3 | Status: SHIPPED | OUTPATIENT
Start: 2020-09-11 | End: 2021-09-20

## 2020-09-11 NOTE — PROGRESS NOTES
HISTORY OF PRESENT ILLNESS  Gopal Soriano is a 79 y.o. female. HPI   Follow up on chronic medical problems. Overall feeling well. Doing the precautionary measures at home to reduce risks of exposure COVID19. Also wearing mask when she is going out. No known sick contacts or known exposure to [address]. Cardiovascular Review:  The patient has hypertension and hyperlipidemia. Diet and Lifestyle: generally follows a low fat low cholesterol diet, generally follows a low sodium diet, exercises sporadically  Home BP Monitoring: is not measured at home. Pertinent ROS: taking medications as instructed, no medication side effects noted, no TIA's, no chest pain on exertion, no dyspnea on exertion, no swelling of ankles. Hypercholesterolemia follow up:  Compliant w/ low fat, low cholesterol diet. Exercising some. Patient fasting today. Thyroid Review:  Patient is seen for followup of hypothyroidism. Thyroid ROS: denies fatigue, heat/cold intolerance, bowel/skin changes or CVS symptoms. Weight is up from last visit. Encounter for pain management. 1./2. Medical history/Past medical History  Chronic Pain:  Has osteoarthritis in multple joints. Pain is worse in the thumb. C/o pain in the right right thumb from arthritits. Pain in the thumb in particular is severe at times. Using the tramadol for severe pain. Taking NSAIDs upsets her stomach. She takes the tramadol less than 1 time a month. 3. Applicable records from prior treatment providers are apart of [facility] under the media tab. 4. Diagnostic, therapeutic and laboratory results are available in the [address] [place] [address] chart. 5. Consultation notes are available for review in the media tab of the [address] [place] [address] chart. 6. Treatment goals include pain control so that the pt may be as active and function with her daily activities and improved comfort level. Previously pt has been limited by pain.     7. The risks and benefits of treatment has been discussed at this office visit with the pt. She understands that the medication has addicting potential.  Additionally the pt has been advised that narcotic pain medication may impair mental and/or physical ability required for performance of tasks such as driving or operating any other machinery. 8. Pt has an updated signed pain contract on file and can be found under the FYI section of the [facility] chart. 9. The pain contract is reviewed. Pain medication will be continued at the previous dosage. Urine drug screening will be done today. Diagnostic studies are not indicated at this time. Interventional procedure are not indicated at this time. 10. Medication prescibed is traMADol (ULTRAM) 50 mg tablet.  has not been reviewed at this OV by me since no refill was needed. 11. Patient instructions have been reviewed in detail as outlined above and in the pain contract. 12. Re-eval is planned for 3 months. Patient Active Problem List   Diagnosis Code    Menopause Z78.0    Hypothyroidism E03.9    Anxiety F41.9    GERD (gastroesophageal reflux disease) K21.9    Lung nodule R91.1    Degenerative arthritis of thumb M18.10       Current Outpatient Medications   Medication Sig Dispense Refill    mupirocin (BACTROBAN) 2 % ointment APPLY TO AFFECTED AREA DAILY AS NEEDED 22 g 0    ALPRAZolam (XANAX) 1 mg tablet TAKE 1 TABLET BY MOUTH TWICE A DAY AS NEEDED. MAX: 2 TABS PER DAY 60 Tab 1    amLODIPine (NORVASC) 2.5 mg tablet Take 2 Tabs by mouth daily. For high blood pressure. 60 Tab 6    promethazine-dextromethorphan (PROMETHAZINE-DM) 6.25-15 mg/5 mL syrup Take 5 mL by mouth every six (6) hours as needed for Cough. 180 mL 0    traMADol (ULTRAM) 50 mg tablet Take 50 mg by mouth every six (6) hours as needed for Pain.  omeprazole (PRILOSEC) 20 mg capsule TAKE 1 CAPSULE BY MOUTH EVERY DAY 90 Cap 3    vitamin E acetate (VITAMIN E PO) Take 180 mg by mouth daily.       levothyroxine (SYNTHROID) 75 mcg tablet TAKE 1 TABLET BY MOUTH EVERY DAY BEFORE BREAKFAST 90 Tab 3    dextromethorphan-guaiFENesin (MUCINEX DM) 60-1,200 mg Tb12 Take 1 Tab by mouth daily as needed.  dextromethorphan-guaiFENesin (ROBITUSSIN-DM)  mg/5 mL syrup Take 10 mL by mouth every four (4) hours as needed for Cough.  black cohosh 40 mg tab Take 1 Tab by mouth daily.  calcium citrate/vitamin D3 (CITRACAL + D PO) Take 1 Tab by mouth daily.  loratadine-pseudoephedrine (CLARITIN-D 12 HOUR) 5-120 mg per tablet Take 1 Tab by mouth two (2) times daily as needed.  fluticasone (FLONASE) 50 mcg/actuation nasal spray USE TWO SPRAYS IN EACH NOSTRIL DAILY  16 g 10       Allergies   Allergen Reactions    Codeine Nausea and Vomiting    Mepivacaine Other (comments)     Mepivacaine 3%   Facial swelling    Novocain [Procaine] Swelling     Facial swelling         Past Medical History:   Diagnosis Date    Anxiety 4/15/2010    Environmental allergies     GERD (gastroesophageal reflux disease)     Hypothyroidism 4/15/2010    Lung nodule     Menopause 4/15/2010    Pneumonia     Thyroid disease          Past Surgical History:   Procedure Laterality Date    HX APPENDECTOMY      HX HYSTERECTOMY      MT COLONOSCOPY FLX DX W/COLLJ SPEC WHEN PFRMD  2010    nawaf         Family History   Problem Relation Age of Onset    Lung Disease Mother         emphysema    Hypertension Mother     Cancer Father         colon     Alzheimer Brother        Social History     Tobacco Use    Smoking status: Former Smoker     Packs/day: 0.50     Years: 30.00     Pack years: 15.00     Last attempt to quit: 2000     Years since quittin.7    Smokeless tobacco: Never Used   Substance Use Topics    Alcohol use:  Yes     Alcohol/week: 0.0 standard drinks     Comment: occasional        Lab Results   Component Value Date/Time    WBC 5.1 2020 02:40 PM    HGB 14.9 2020 02:40 PM    HCT 42.6 2020 02:40 PM    PLATELET 048 63/70/7404 02:40 PM    MCV 88 02/28/2020 02:40 PM     Lab Results   Component Value Date/Time    Cholesterol, total 210 (H) 02/28/2020 02:40 PM    HDL Cholesterol 90 02/28/2020 02:40 PM    LDL, calculated 110 (H) 02/28/2020 02:40 PM    Triglyceride 49 02/28/2020 02:40 PM    CHOL/HDL Ratio 2.3 02/17/2009 04:08 PM     Lab Results   Component Value Date/Time    TSH 1.320 02/28/2020 02:40 PM    T4, Free 1.56 12/11/2015 08:13 AM      Lab Results   Component Value Date/Time    Sodium 142 02/28/2020 02:40 PM    Potassium 4.6 02/28/2020 02:40 PM    Chloride 100 02/28/2020 02:40 PM    CO2 27 02/28/2020 02:40 PM    Anion gap 6 10/07/2012 11:30 AM    Glucose 83 02/28/2020 02:40 PM    BUN 15 02/28/2020 02:40 PM    Creatinine 0.80 02/28/2020 02:40 PM    BUN/Creatinine ratio 19 02/28/2020 02:40 PM    GFR est AA 86 02/28/2020 02:40 PM    GFR est non-AA 75 02/28/2020 02:40 PM    Calcium 10.3 02/28/2020 02:40 PM    Bilirubin, total 0.5 02/28/2020 02:40 PM    ALT (SGPT) 18 02/28/2020 02:40 PM    Alk. phosphatase 74 02/28/2020 02:40 PM    Protein, total 7.2 02/28/2020 02:40 PM    Albumin 5.3 (H) 02/28/2020 02:40 PM    Globulin 3.7 10/07/2012 11:30 AM    A-G Ratio 2.8 (H) 02/28/2020 02:40 PM      Lab Results   Component Value Date/Time    Hemoglobin A1c 5.3 04/20/2012 10:34 AM    Hemoglobin A1c (POC) 4.9 05/14/2018 10:15 AM         Review of Systems   Constitutional: Negative for malaise/fatigue. HENT: Negative for congestion. Eyes: Negative for blurred vision. Respiratory: Negative for cough and shortness of breath. Cardiovascular: Negative for chest pain, palpitations and leg swelling. Gastrointestinal: Negative for abdominal pain, constipation and heartburn. Genitourinary: Negative for dysuria, frequency and urgency. Musculoskeletal: Negative for back pain. Neurological: Negative for dizziness, tingling and headaches. Endo/Heme/Allergies: Negative for environmental allergies. Psychiatric/Behavioral: Negative for depression. The patient does not have insomnia. Physical Exam  Vitals signs and nursing note reviewed. Constitutional:       Appearance: Normal appearance. She is well-developed. Comments: /78   Pulse 71   Temp 97.3 °F (36.3 °C) (Temporal)   Resp 18   Ht 5' 2\" (1.575 m)   Wt 123 lb 6.4 oz (56 kg)   LMP 01/01/1983   SpO2 98%   BMI 22.57 kg/m²        HENT:      Right Ear: Tympanic membrane and ear canal normal.      Left Ear: Tympanic membrane and ear canal normal.      Nose: No mucosal edema or rhinorrhea. Neck:      Musculoskeletal: Normal range of motion and neck supple. Thyroid: No thyromegaly. Cardiovascular:      Rate and Rhythm: Normal rate and regular rhythm. Heart sounds: Normal heart sounds. No gallop. Pulmonary:      Effort: Pulmonary effort is normal.      Breath sounds: Normal breath sounds. Abdominal:      General: Bowel sounds are normal.      Palpations: Abdomen is soft. There is no mass. Tenderness: There is no abdominal tenderness. Musculoskeletal: Normal range of motion. General: No swelling. Lymphadenopathy:      Cervical: No cervical adenopathy. Skin:     General: Skin is warm and dry. ASSESSMENT and PLAN  Diagnoses and all orders for this visit:    1. Medicare annual wellness visit, subsequent    2. Essential hypertension  Stable     3. Hypercholesterolemia  Continue to monitor. Work on diet and exercise. 4. Hypothyroidism due to acquired atrophy of thyroid  -     levothyroxine (SYNTHROID) 75 mcg tablet; Take 1 Tab by mouth Daily (before breakfast). -     TSH 3RD GENERATION    5. Gastroesophageal reflux disease, esophagitis presence not specified  Stable with prilosec. 6. Anxiety  Stable on prn xanax    7. Encounter for medication monitoring  -     METABOLIC PANEL, BASIC    8.  Age-related osteoporosis without current pathological fracture  Discussed her bone density scan results. Will continue to monitor. Pt declines further treatment options. -     VITAMIN D, 25 HYDROXY    9. Encounter for chronic pain management  -     MONITOR SCREEN 10-DRUG CLASS PROFILE      Follow-up and Dispositions    · Return in about 6 months (around 3/11/2021). reviewed diet, exercise and weight control  cardiovascular risk and specific lipid/LDL goals reviewed  reviewed medications and side effects in detail    I have discussed diagnosis listed in this note with pt and/or family. I have discussed treatment plans and options and the risk/benefit analysis of those options, including safe use of medications and possible medication side effects. Through the use of shared decision making we have agreed to the above plan. The patient has received an after-visit summary and questions were answered concerning future plans and follow up. Advise pt of any urgent changes then to proceed to the ER.

## 2020-09-11 NOTE — PROGRESS NOTES
This is a Subsequent Medicare Annual Wellness Exam (AWV) (Performed 12 months after IPPE or effective date of Medicare Part B enrollment)    I have reviewed the patient's medical history in detail and updated the computerized patient record. History     Patient Active Problem List   Diagnosis Code    Menopause Z78.0    Hypothyroidism E03.9    Anxiety F41.9    GERD (gastroesophageal reflux disease) K21.9    Lung nodule R91.1    Degenerative arthritis of thumb M18.10       Current Outpatient Medications   Medication Sig Dispense Refill    mupirocin (BACTROBAN) 2 % ointment APPLY TO AFFECTED AREA DAILY AS NEEDED 22 g 0    ALPRAZolam (XANAX) 1 mg tablet TAKE 1 TABLET BY MOUTH TWICE A DAY AS NEEDED. MAX: 2 TABS PER DAY 60 Tab 1    amLODIPine (NORVASC) 2.5 mg tablet Take 2 Tabs by mouth daily. For high blood pressure. 60 Tab 6    promethazine-dextromethorphan (PROMETHAZINE-DM) 6.25-15 mg/5 mL syrup Take 5 mL by mouth every six (6) hours as needed for Cough. 180 mL 0    traMADol (ULTRAM) 50 mg tablet Take 50 mg by mouth every six (6) hours as needed for Pain.  omeprazole (PRILOSEC) 20 mg capsule TAKE 1 CAPSULE BY MOUTH EVERY DAY 90 Cap 3    vitamin E acetate (VITAMIN E PO) Take 180 mg by mouth daily.  levothyroxine (SYNTHROID) 75 mcg tablet TAKE 1 TABLET BY MOUTH EVERY DAY BEFORE BREAKFAST 90 Tab 3    dextromethorphan-guaiFENesin (MUCINEX DM) 60-1,200 mg Tb12 Take 1 Tab by mouth daily as needed.  dextromethorphan-guaiFENesin (ROBITUSSIN-DM)  mg/5 mL syrup Take 10 mL by mouth every four (4) hours as needed for Cough.  black cohosh 40 mg tab Take 1 Tab by mouth daily.  calcium citrate/vitamin D3 (CITRACAL + D PO) Take 1 Tab by mouth daily.  loratadine-pseudoephedrine (CLARITIN-D 12 HOUR) 5-120 mg per tablet Take 1 Tab by mouth two (2) times daily as needed.       fluticasone (FLONASE) 50 mcg/actuation nasal spray USE TWO SPRAYS IN EACH NOSTRIL DAILY  16 g 10 Allergies   Allergen Reactions    Codeine Nausea and Vomiting    Mepivacaine Other (comments)     Mepivacaine 3%   Facial swelling    Novocain [Procaine] Swelling     Facial swelling       Past Medical History:   Diagnosis Date    Anxiety 4/15/2010    Environmental allergies     GERD (gastroesophageal reflux disease)     Hypothyroidism 4/15/2010    Lung nodule     Menopause 4/15/2010    Pneumonia     Thyroid disease        Past Surgical History:   Procedure Laterality Date    HX APPENDECTOMY      HX HYSTERECTOMY      OK COLONOSCOPY FLX DX W/COLLJ SPEC WHEN PFRMD  2010    nawaf       Family History   Problem Relation Age of Onset    Lung Disease Mother         emphysema    Hypertension Mother     Cancer Father         colon     Alzheimer Brother        Social History     Tobacco Use    Smoking status: Former Smoker     Packs/day: 0.50     Years: 30.00     Pack years: 15.00     Last attempt to quit: 2000     Years since quittin.7    Smokeless tobacco: Never Used   Substance Use Topics    Alcohol use: Yes     Alcohol/week: 0.0 standard drinks     Comment: occasional         Depression Risk Factor Screening:     PHQ over the last two weeks    Little interest or pleasure in doing things Not at all   Feeling down, depressed or hopeless Not at all   Total Score PHQ 2 0     Alcohol Risk Factor Screening: You do not drink alcohol or very rarely. Functional Ability and Level of Safety:   Hearing Loss  Hearing is good. Activities of Daily Living  The home contains: no safety equipment. Patient does total self care    Fall RiskFall Risk Assessment, last 12 mths    Able to walk? Yes   Fall in past 12 months? No     Functional Ability:   Does the patient exhibit a steady gait?   yes    How long did it take the patient to get up and walk from a sitting position? seconds    Is the patient self reliant? (ie can do own laundry, meals, household chores)  yes   Does the patient handle his/her own medications? yes   Does the patient handle his/her own money? yes   Is the patients home safe (ie good lighting, handrails on stairs and bath, etc.)? yes   Did you notice or did patient express any hearing difficulties? no   Did you notice or did patient express any vision difficulties? no        Advance Care Planning:   Patient was offered the opportunity to discuss advance care planning:  yes    Does patient have an Advance Directive:  no   If no, did you provide information on [facility]ctions? yes      Abuse Screen  Patient is not abused    Cognitive Screening   Evaluation of Cognitive Function:  Has your family/caregiver stated any concerns about your memory: no      Patient Care Team   Patient Care Team:  Wendie Halsted, MD as PCP - General    Assessment/Plan   Education and counseling provided:  Are appropriate based on today's review and evaluation  End-of-Life planning (with patient's consent)    ASSESSMENT and PLAN    Medicare Annual Wellness  Continue current treatment plan. Continue annual follow up. I have discussed diagnosis listed in this note with pt and/or family. I have discussed treatment plans and options and the risk/benefit analysis of those options, including safe use of medications and possible medication side effects. Through the use of shared decision making we have agreed to the above plan. The patient has received an after-visit summary and questions were answered concerning future plans and follow up. Advise pt of any urgent changes then to proceed to the ER.

## 2020-09-11 NOTE — PROGRESS NOTES
Chief Complaint   Patient presents with    Well Woman     Pt getting annual physical.     1. Have you been to the ER, urgent care clinic since your last visit? Hospitalized since your last visit? No    2. Have you seen or consulted any other health care providers outside of the [address] [place] [address] since your last visit? Include any pap smears or colon screening.  No

## 2020-09-12 LAB
25(OH)D3 SERPL-MCNC: 52 NG/ML (ref 30–100)
BUN SERPL-MCNC: 18 MG/DL (ref 7–25)
BUN/CREATININE RATIO,BUCR: NORMAL (CALC) (ref 6–22)
CALCIUM SERPL-MCNC: 10.2 MG/DL (ref 8.6–10.4)
CHLORIDE SERPL-SCNC: 102 MMOL/L (ref 98–110)
CO2 SERPL-SCNC: 31 MMOL/L (ref 20–32)
COMMENT, 97000708: NORMAL
CREAT SERPL-MCNC: 0.88 MG/DL (ref 0.6–0.93)
GLUCOSE SERPL-MCNC: 90 MG/DL (ref 65–99)
POTASSIUM SERPL-SCNC: 4.9 MMOL/L (ref 3.5–5.3)
SODIUM SERPL-SCNC: 139 MMOL/L (ref 135–146)
SPECIMEN(S) SUBMITTED: NORMAL
TSH SERPL DL<=0.005 MIU/L-ACNC: 1.39 MIU/L (ref 0.4–4.5)
UNCLEAR ORDER: NORMAL

## 2020-09-17 LAB
AMPHETAMINES: ABNORMAL
BENZODIAZEPINE,BZOT: ABNORMAL
Lab: ABNORMAL
MARIJUANA METABOLITE: ABNORMAL

## 2020-10-01 DIAGNOSIS — F41.9 ANXIETY: ICD-10-CM

## 2020-10-02 RX ORDER — ALPRAZOLAM 1 MG/1
TABLET ORAL
Qty: 60 TAB | Refills: 0 | Status: SHIPPED | OUTPATIENT
Start: 2020-10-02 | End: 2020-11-01

## 2020-10-27 ENCOUNTER — TELEPHONE (OUTPATIENT)
Dept: FAMILY MEDICINE CLINIC | Age: 70
End: 2020-10-27

## 2020-10-27 DIAGNOSIS — F41.9 ANXIETY: ICD-10-CM

## 2020-10-27 RX ORDER — ALPRAZOLAM 1 MG/1
TABLET ORAL
Qty: 60 TAB | Refills: 0 | Status: CANCELLED | OUTPATIENT
Start: 2020-10-27

## 2020-10-27 NOTE — TELEPHONE ENCOUNTER
Patient will be out of Alprazolam on 10/30/20 , patient thinks pharmacy is not giving her the correct amount, advised patient to contact the pharmacy.

## 2020-11-01 DIAGNOSIS — F41.9 ANXIETY: ICD-10-CM

## 2020-11-01 RX ORDER — ALPRAZOLAM 1 MG/1
TABLET ORAL
Qty: 60 TAB | Refills: 0 | Status: SHIPPED | OUTPATIENT
Start: 2020-11-01 | End: 2020-12-01 | Stop reason: SDUPTHER

## 2020-11-06 RX ORDER — MUPIROCIN 20 MG/G
OINTMENT TOPICAL DAILY
Qty: 22 G | Refills: 0 | Status: SHIPPED | OUTPATIENT
Start: 2020-11-06 | End: 2020-12-09

## 2020-12-01 DIAGNOSIS — F41.9 ANXIETY: ICD-10-CM

## 2020-12-01 RX ORDER — ALPRAZOLAM 1 MG/1
TABLET ORAL
Qty: 60 TAB | Refills: 0 | OUTPATIENT
Start: 2020-12-01

## 2020-12-01 RX ORDER — ALPRAZOLAM 1 MG/1
TABLET ORAL
Qty: 60 TAB | Refills: 3 | Status: SHIPPED | OUTPATIENT
Start: 2020-12-01 | End: 2021-03-31

## 2020-12-09 RX ORDER — MUPIROCIN 20 MG/G
OINTMENT TOPICAL DAILY
Qty: 22 G | Refills: 0 | Status: SHIPPED | OUTPATIENT
Start: 2020-12-09 | End: 2021-03-26

## 2021-01-08 DIAGNOSIS — I10 ESSENTIAL HYPERTENSION: ICD-10-CM

## 2021-01-08 RX ORDER — AMLODIPINE BESYLATE 2.5 MG/1
5 TABLET ORAL DAILY
Qty: 180 TAB | Refills: 2 | Status: SHIPPED | OUTPATIENT
Start: 2021-01-08 | End: 2021-10-18

## 2021-01-22 DIAGNOSIS — K21.9 GASTROESOPHAGEAL REFLUX DISEASE: ICD-10-CM

## 2021-01-22 RX ORDER — OMEPRAZOLE 20 MG/1
CAPSULE, DELAYED RELEASE ORAL
Qty: 90 CAP | Refills: 3 | Status: SHIPPED | OUTPATIENT
Start: 2021-01-22 | End: 2021-12-27

## 2021-03-12 ENCOUNTER — OFFICE VISIT (OUTPATIENT)
Dept: FAMILY MEDICINE CLINIC | Age: 71
End: 2021-03-12
Payer: MEDICARE

## 2021-03-12 VITALS
SYSTOLIC BLOOD PRESSURE: 134 MMHG | BODY MASS INDEX: 23.37 KG/M2 | RESPIRATION RATE: 16 BRPM | DIASTOLIC BLOOD PRESSURE: 73 MMHG | TEMPERATURE: 96.8 F | OXYGEN SATURATION: 98 % | WEIGHT: 127 LBS | HEART RATE: 74 BPM | HEIGHT: 62 IN

## 2021-03-12 DIAGNOSIS — G89.29 ENCOUNTER FOR CHRONIC PAIN MANAGEMENT: ICD-10-CM

## 2021-03-12 DIAGNOSIS — M75.82 TENDINITIS OF LEFT ROTATOR CUFF: ICD-10-CM

## 2021-03-12 DIAGNOSIS — Z51.81 ENCOUNTER FOR MEDICATION MONITORING: ICD-10-CM

## 2021-03-12 DIAGNOSIS — Z86.010 HISTORY OF COLONIC POLYPS: ICD-10-CM

## 2021-03-12 DIAGNOSIS — I10 ESSENTIAL HYPERTENSION: Primary | ICD-10-CM

## 2021-03-12 DIAGNOSIS — E03.4 HYPOTHYROIDISM DUE TO ACQUIRED ATROPHY OF THYROID: ICD-10-CM

## 2021-03-12 DIAGNOSIS — Z12.11 COLON CANCER SCREENING: ICD-10-CM

## 2021-03-12 DIAGNOSIS — M25.50 ARTHRALGIA OF MULTIPLE JOINTS: ICD-10-CM

## 2021-03-12 DIAGNOSIS — E78.00 HYPERCHOLESTEROLEMIA: ICD-10-CM

## 2021-03-12 PROCEDURE — G8536 NO DOC ELDER MAL SCRN: HCPCS | Performed by: FAMILY MEDICINE

## 2021-03-12 PROCEDURE — G8754 DIAS BP LESS 90: HCPCS | Performed by: FAMILY MEDICINE

## 2021-03-12 PROCEDURE — G9899 SCRN MAM PERF RSLTS DOC: HCPCS | Performed by: FAMILY MEDICINE

## 2021-03-12 PROCEDURE — 99213 OFFICE O/P EST LOW 20 MIN: CPT | Performed by: FAMILY MEDICINE

## 2021-03-12 PROCEDURE — 1101F PT FALLS ASSESS-DOCD LE1/YR: CPT | Performed by: FAMILY MEDICINE

## 2021-03-12 PROCEDURE — G8510 SCR DEP NEG, NO PLAN REQD: HCPCS | Performed by: FAMILY MEDICINE

## 2021-03-12 PROCEDURE — 3017F COLORECTAL CA SCREEN DOC REV: CPT | Performed by: FAMILY MEDICINE

## 2021-03-12 PROCEDURE — 1090F PRES/ABSN URINE INCON ASSESS: CPT | Performed by: FAMILY MEDICINE

## 2021-03-12 PROCEDURE — G8420 CALC BMI NORM PARAMETERS: HCPCS | Performed by: FAMILY MEDICINE

## 2021-03-12 PROCEDURE — G8752 SYS BP LESS 140: HCPCS | Performed by: FAMILY MEDICINE

## 2021-03-12 PROCEDURE — G0463 HOSPITAL OUTPT CLINIC VISIT: HCPCS | Performed by: FAMILY MEDICINE

## 2021-03-12 PROCEDURE — G8427 DOCREV CUR MEDS BY ELIG CLIN: HCPCS | Performed by: FAMILY MEDICINE

## 2021-03-12 PROCEDURE — G8399 PT W/DXA RESULTS DOCUMENT: HCPCS | Performed by: FAMILY MEDICINE

## 2021-03-12 RX ORDER — METHYLPREDNISOLONE ACETATE 40 MG/ML
40 INJECTION, SUSPENSION INTRA-ARTICULAR; INTRALESIONAL; INTRAMUSCULAR; SOFT TISSUE ONCE
Qty: 1 VIAL | Refills: 0
Start: 2021-03-12 | End: 2021-03-12

## 2021-03-12 NOTE — PROGRESS NOTES
[address]  OFFICE PROCEDURE PROGRESS NOTE        Chart reviewed for the following:   Jaquan Oh LPN, have reviewed the History, Physical and updated the Allergic reactions for Rubio Goff performed immediately prior to start of procedure:   Sarah FARNSWORTH LPN, have performed the following reviews on Baljinder Tam prior to the start of the procedure:            * Patient was identified by name and date of birth   * Agreement on procedure being performed was verified  * Risks and Benefits explained to the patient  * Procedure site verified and marked as necessary  * Patient was positioned for comfort  * Consent was signed and verified     Time: 8:15 am      Date of procedure: 3/12/2021    Procedure performed by:  Moise Stevens MD    Provider assisted by:  none    Patient assisted by: none    How tolerated by patient: well    Post Procedural Pain Scale: 8    Comments: left shoulder steroid injection

## 2021-03-12 NOTE — PATIENT INSTRUCTIONS
Rotator Cuff Injury: Care Instructions Your Care Instructions The rotator cuff is a group of tendons and muscles around the shoulder that keeps the upper arm bone in place. It keeps the shoulder joint stable and allows you to raise and rotate your arm. Damage to the rotator cuff can be caused by overuse, a fall, or a direct blow to the shoulder area, which can tear the tendons. Over time, everyday wear can damage the tendons and make injury more likely. Treatment can depend upon the amount of damage to the tendons. In a younger person, surgery may be the first choice. But if you are older than 25, you likely have some wear on your rotator cuff. Surgery may not be the most effective treatment. Your doctor may have you try physical therapy and exercise first. 
Follow-up care is a key part of your treatment and safety. Be sure to make and go to all appointments, and call your doctor if you are having problems. It's also a good idea to know your test results and keep a list of the medicines you take. How can you care for yourself at home? · Rest your shoulder as much as you can. If your doctor put your arm in a sling or shoulder immobilizer, wear it as directed. Do not take it off before your doctor tells you to. If it is too tight, loosen it. · Be safe with medicines. Read and follow all instructions on the label. ? If the doctor gave you a prescription medicine for pain, take it as prescribed. ? If you are not taking a prescription pain medicine, ask your doctor if you can take an over-the-counter medicine. · Put ice or a cold pack on your shoulder for 10 to 20 minutes at a time. Try to do this every 1 to 2 hours for the next 3 days (when you are awake). Put a thin cloth between the ice pack and your skin. · After 3 days, put a warm, wet towel on your shoulder. This is to relax the muscles and help blood flow.  
· While holding a warm, wet towel on your shoulder, lean forward so your arm hangs freely, and gently swing your arm back and forth like a pendulum. You also can do this standing under a warm shower. · Do not do anything that makes your pain worse. · Follow your doctor's advice about whether you need physical therapy. When should you call for help? Call your doctor now or seek immediate medical care if: 
  · You have severe pain.  
  · You cannot move your shoulder or arm.  
  · You have tingling or numbness in your arm or hand.  
  · Your arm or hand is cool or pale. Watch closely for changes in your health, and be sure to contact your doctor if: 
  · Your pain gets worse.  
  · You have new or worse swelling in your arm or hand.  
  · You do not get better as expected. Where can you learn more? Go to http://www.gray.com/ Enter [phone] in the search box to learn more about \"Rotator Cuff Injury: Care Instructions. \" Current as of: March 2, 2020               Content Version: 12.6 © 5446-3062 [employer]. Care instructions adapted under license by [employer] (which disclaims liability or warranty for this information). If you have questions about a medical condition or this instruction, always ask your healthcare professional. [place] [address] any warranty or liability for your use of this information. Rotator Cuff: Exercises Introduction Here are some examples of exercises for you to try. The exercises may be suggested for a condition or for rehabilitation. Start each exercise slowly. Ease off the exercises if you start to have pain. You will be told when to start these exercises and which ones will work best for you. How to do the exercises Pendulum swing If you have pain in your back, do not do this exercise. 1. Hold on to a table or the back of a chair with your good arm. Then bend forward a little and let your sore arm hang straight down. This exercise does not use the arm muscles.  Rather, use your legs and your hips to create movement that makes your arm swing freely. 2. Use the movement from your hips and legs to guide the slightly swinging arm back and forth like a pendulum (or elephant trunk). Then guide it in circles that start small (about the size of a dinner plate). Make the circles a bit larger each day, as your pain allows. 3. Do this exercise for 5 minutes, 5 to 7 times each day. 4. As you have less pain, try bending over a little farther to do this exercise. This will increase the amount of movement at your shoulder. Posterior stretching exercise 1. Hold the elbow of your injured arm with your other hand. 2. Use your hand to pull your injured arm gently up and across your body. You will feel a gentle stretch across the back of your injured shoulder. 3. Hold for at least 15 to 30 seconds. Then slowly lower your arm. 4. Repeat 2 to 4 times. Up-the-back stretch Your doctor or physical therapist may want you to wait to do this stretch until you have regained most of your range of motion and strength. You can do this stretch in different ways. Hold any of these stretches for at least 15 to 30 seconds. Repeat them 2 to 4 times. 1. Light stretch: Put your hand in your back pocket. Let it rest there to stretch your shoulder. 2. Moderate stretch: With your other hand, hold your injured arm (palm outward) behind your back by the wrist. Pull your arm up gently to stretch your shoulder. 3. Advanced stretch: Put a towel over your other shoulder. Put the hand of your injured arm behind your back. Now hold the back end of the towel. With the other hand, hold the front end of the towel in front of your body. Pull gently on the front end of the towel. This will bring your hand farther up your back to stretch your shoulder. Overhead stretch 1. Standing about an arm's length away, grasp onto a solid surface. You could use a countertop, a doorknob, or the back of a sturdy chair.  
2. With your knees slightly bent, bend forward with your arms straight. Lower your upper body, and let your shoulders stretch. 3. As your shoulders are able to stretch farther, you may need to take a step or two backward. 4. Hold for at least 15 to 30 seconds. Then stand up and relax. If you had stepped back during your stretch, step forward so you can keep your hands on the solid surface. 5. Repeat 2 to 4 times. Shoulder flexion (lying down) To make a wand for this exercise, use a piece of PVC pipe or a broom handle with the broom removed. Make the wand about a foot wider than your shoulders. 1. Lie on your back, holding a wand with both hands. Your palms should face down as you hold the wand. 2. Keeping your elbows straight, slowly raise your arms over your head. Raise them until you feel a stretch in your shoulders, upper back, and chest. 
3. Hold for 15 to 30 seconds. 4. Repeat 2 to 4 times. Shoulder rotation (lying down) To make a wand for this exercise, use a piece of PVC pipe or a broom handle with the broom removed. Make the wand about a foot wider than your shoulders. 1. Lie on your back. Hold a wand with both hands with your elbows bent and palms up. 2. Keep your elbows close to your body, and move the wand across your body toward the sore arm. 3. Hold for 8 to 12 seconds. 4. Repeat 2 to 4 times. Wall climbing (to the side) Avoid any movement that is straight to your side, and be careful not to arch your back. Your arm should stay about 30 degrees to the front of your side. 1. Stand with your side to a wall so that your fingers can just touch it at an angle about 30 degrees toward the front of your body. 2. Walk the fingers of your injured arm up the wall as high as pain permits. Try not to shrug your shoulder up toward your ear as you move your arm up. 3. Hold that position for a count of at least 15 to 20. 
4. Walk your fingers back down to the starting position.  
5. Repeat at least 2 to 4 times. Try to reach higher each time. Wall climbing (to the front) During this stretching exercise, be careful not to arch your back. 1. Face a wall, and stand so your fingers can just touch it. 2. Keeping your shoulder down, walk the fingers of your injured arm up the wall as high as pain permits. (Don't shrug your shoulder up toward your ear.) 3. Hold your arm in that position for at least 15 to 30 seconds. 4. Slowly walk your fingers back down to where you started. 5. Repeat at least 2 to 4 times. Try to reach higher each time. Shoulder blade squeeze 1. Stand with your arms at your sides, and squeeze your shoulder blades together. Do not raise your shoulders up as you squeeze. 2. Hold 6 seconds. 3. Repeat 8 to 12 times. Scapular exercise: Arm reach 1. Lie flat on your back. This exercise is a very slight motion that starts with your arms raised (elbows straight, arms straight). 2. From this position, reach higher toward the celia or ceiling. Keep your elbows straight. All motion should be from your shoulder blade only. 3. Relax your arms back to where you started. 4. Repeat 8 to 12 times. Arm raise to the side During this strengthening exercise, your arm should stay about 30 degrees to the front of your side. 1. Slowly raise your injured arm to the side, with your thumb facing up. Raise your arm 60 degrees at the most (shoulder level is 90 degrees). 2. Hold the position for 3 to 5 seconds. Then lower your arm back to your side. If you need to, bring your \"good\" arm across your body and place it under the elbow as you lower your injured arm. Use your good arm to keep your injured arm from dropping down too fast. 
3. Repeat 8 to 12 times. 4. When you first start out, don't hold any extra weight in your hand. As you get stronger, you may use a 1-pound to 2-pound dumbbell or a small can of food. Shoulder flexor and extensor exercise These are isometric exercises.  That means you contract your muscles without actually moving. 1. Push forward (flex): Stand facing a wall or doorjamb, about 6 inches or less back. Hold your injured arm against your body. Make a closed fist with your thumb on top. Then gently push your hand forward into the wall with about 25% to 50% of your strength. Don't let your body move backward as you push. Hold for about 6 seconds. Relax for a few seconds. Repeat 8 to 12 times. 2. Push backward (extend): Stand with your back flat against a wall. Your upper arm should be against the wall, with your elbow bent 90 degrees (your hand straight ahead). Push your elbow gently back against the wall with about 25% to 50% of your strength. Don't let your body move forward as you push. Hold for about 6 seconds. Relax for a few seconds. Repeat 8 to 12 times. Scapular exercise: Wall push-ups This exercise is best done with your fingers somewhat turned out, rather than straight up and down. 1. Stand facing a wall, about 12 inches to 18 inches away. 2. Place your hands on the wall at shoulder height. 3. Slowly bend your elbows and bring your face to the wall. Keep your back and hips straight. 4. Push back to where you started. 5. Repeat 8 to 12 times. 6. When you can do this exercise against a wall comfortably, you can try it against a counter. You can then slowly progress to the end of a couch, then to a sturdy chair, and finally to the floor. Scapular exercise: Retraction For this exercise, you will need elastic exercise material, such as surgical tubing or Thera-Band. 1. Put the band around a solid object at about waist level. (A bedpost will work well.) Each hand should hold an end of the band. 2. With your elbows at your sides and bent to 90 degrees, pull the band back. Your shoulder blades should move toward each other. Then move your arms back where you started. 3. Repeat 8 to 12 times.  
4. If you have good range of motion in your shoulders, try this exercise with your arms lifted out to the sides. Keep your elbows at a 90-degree angle. Raise the elastic band up to about shoulder level. Pull the band back to move your shoulder blades toward each other. Then move your arms back where you started. Internal rotator strengthening exercise 1. Start by tying a piece of elastic exercise material to a doorknob. You can use surgical tubing or Thera-Band. 2. Stand or sit with your shoulder relaxed and your elbow bent 90 degrees. Your upper arm should rest comfortably against your side. Squeeze a rolled towel between your elbow and your body for comfort. This will help keep your arm at your side. 3. Hold one end of the elastic band in the hand of the painful arm. 4. Slowly rotate your forearm toward your body until it touches your belly. Slowly move it back to where you started. 5. Keep your elbow and upper arm firmly tucked against the towel roll or at your side. 6. Repeat 8 to 12 times. External rotator strengthening exercise 1. Start by tying a piece of elastic exercise material to a doorknob. You can use surgical tubing or Thera-Band. (You may also hold one end of the band in each hand.) 2. Stand or sit with your shoulder relaxed and your elbow bent 90 degrees. Your upper arm should rest comfortably against your side. Squeeze a rolled towel between your elbow and your body for comfort. This will help keep your arm at your side. 3. Hold one end of the elastic band with the hand of the painful arm. 4. Start with your forearm across your belly. Slowly rotate the forearm out away from your body. Keep your elbow and upper arm tucked against the towel roll or the side of your body until you begin to feel tightness in your shoulder. Slowly move your arm back to where you started. 5. Repeat 8 to 12 times. Follow-up care is a key part of your treatment and safety. Be sure to make and go to all appointments, and call your doctor if you are having problems.  It's also a good idea to know your test results and keep a list of the medicines you take. Where can you learn more? Go to http://www.gray.com/ Enter Adam Gracia in the search box to learn more about \"Rotator Cuff: Exercises. \" Current as of: March 2, 2020               Content Version: 12.6 © 2218-0704 [employer]. Care instructions adapted under license by [employer] (which disclaims liability or warranty for this information). If you have questions about a medical condition or this instruction, always ask your healthcare professional. [address] any warranty or liability for your use of this information.

## 2021-03-12 NOTE — PROGRESS NOTES
Chief Complaint   Patient presents with    Hypertension     follow up    Thyroid Problem     follow up    Shoulder Pain     patient c/o left shoulder pain on and off for about 3 months         Mammogram: 7/24/2020    Bone density 7/24/2020    Colonoscopy 3/12/2015 by Dr. Mary Mitchell in 5 yrs. Patient is aware she is due. 1. Have you been to the ER, urgent care clinic since your last visit? Hospitalized since your last visit? No    2. Have you seen or consulted any other health care providers outside of the [address] [place] [address] since your last visit? Include any pap smears or colon screening.  no

## 2021-03-12 NOTE — PROGRESS NOTES
HISTORY OF PRESENT ILLNESS  Rahel Lindsey is a 70 y.o. female. HPI   Follow up on chronic medical problems. Overall feeling well. Doing the precautionary measures at home to reduce risks of exposure COVID19. Also wearing mask when she is going out. No known sick contacts or known exposure to [address]. C/o left right shoulder pain for the past 3 months. Sx comes and goes. Radiates to the upper arm. Increase pain with ROM. No injury noted. Pain is 8/10 when at its worse. Has only been taking occassional advil for the pain which does help. Increase pain at night. Cardiovascular Review:  The patient has hypertension and hyperlipidemia. Diet and Lifestyle: generally follows a low fat low cholesterol diet, generally follows a low sodium diet, exercises sporadically  Home BP Monitoring: is not measured at home. Pertinent ROS: taking medications as instructed, no medication side effects noted, no TIA's, no chest pain on exertion, no dyspnea on exertion, no swelling of ankles. Hypercholesterolemia follow up:  Compliant w/ low fat, low cholesterol diet. Exercising some. Patient fasting today. Thyroid Review:  Patient is seen for followup of hypothyroidism. Thyroid ROS: denies fatigue, heat/cold intolerance, bowel/skin changes or CVS symptoms. Weight overall stable. Encounter for pain management. 1./2. Medical history/Past medical History  Chronic Pain:  Has osteoarthritis in multple joints. Pain is worse in the thumb. C/o pain in the right right thumb from arthritits. Pain in the thumb in particular is severe at times. Using the tramadol for severe pain. Taking NSAIDs upsets her stomach. She takes the tramadol less than 1 time a month. 3. Applicable records from prior treatment providers are apart of [facility] under the media tab. 4. Diagnostic, therapeutic and laboratory results are available in the [facility] chart.     5. Consultation notes are available for review in the media tab of the [facility] chart. 6. Treatment goals include pain control so that the pt may be as active and function with her daily activities and improved comfort level. Previously pt has been limited by pain. 7. The risks and benefits of treatment has been discussed at this office visit with the pt. She understands that the medication has addicting potential.  Additionally the pt has been advised that narcotic pain medication may impair mental and/or physical ability required for performance of tasks such as driving or operating any other machinery. 8. Pt has an updated signed pain contract on file and can be found under the FYI section of the [facility] chart. 9. The pain contract is reviewed. Pain medication will be continued at the previous dosage. Urine drug screening will be done today. Diagnostic studies are not indicated at this time. Interventional procedure are not indicated at this time. 10. Medication prescibed is traMADol (ULTRAM) 50 mg tablet.  has not been reviewed at this OV by me since no refill was needed. 11. Patient instructions have been reviewed in detail as outlined above and in the pain contract. 12. Re-eval is planned for 3 months. Patient Active Problem List   Diagnosis Code    Menopause Z78.0    Hypothyroidism E03.9    Anxiety F41.9    GERD (gastroesophageal reflux disease) K21.9    Lung nodule R91.1    Degenerative arthritis of thumb M18.10       Current Outpatient Medications   Medication Sig Dispense Refill    omeprazole (PRILOSEC) 20 mg capsule TAKE 1 CAPSULE BY MOUTH EVERY DAY 90 Cap 3    amLODIPine (NORVASC) 2.5 mg tablet TAKE 2 TABS BY MOUTH DAILY. FOR HIGH BLOOD PRESSURE. 180 Tab 2    mupirocin (BACTROBAN) 2 % ointment APPLY TO AFFECTED AREA DAILY AS NEEDED 22 g 0    ALPRAZolam (XANAX) 1 mg tablet TAKE 1 TABLET BY MOUTH TWICE A DAY AS NEEDED.  MAX: 2 TABS PER DAY 60 Tab 3    levothyroxine (SYNTHROID) 75 mcg tablet Take 1 Tab by mouth Daily (before breakfast). 90 Tab 3    calcium-cholecalciferol, d3, (CALCIUM 600 + D) 600-125 mg-unit tab Take  by mouth.  traMADol (ULTRAM) 50 mg tablet Take 50 mg by mouth every six (6) hours as needed for Pain.  vitamin E acetate (VITAMIN E PO) Take 180 mg by mouth daily.  black cohosh 40 mg tab Take 1 Tab by mouth daily.  fluticasone (FLONASE) 50 mcg/actuation nasal spray USE TWO SPRAYS IN EACH NOSTRIL DAILY  16 g 10       Allergies   Allergen Reactions    Codeine Nausea and Vomiting    Mepivacaine Other (comments)     Mepivacaine 3%   Facial swelling    Novocain [Procaine] Swelling     Facial swelling       Past Medical History:   Diagnosis Date    Anxiety 4/15/2010    Environmental allergies     GERD (gastroesophageal reflux disease)     Hypothyroidism 4/15/2010    Lung nodule     Menopause 4/15/2010    Pneumonia     Thyroid disease        Past Surgical History:   Procedure Laterality Date    HX APPENDECTOMY      HX HYSTERECTOMY      LA COLONOSCOPY FLX DX W/COLLJ SPEC WHEN PFRMD  2010    nawaf       Family History   Problem Relation Age of Onset    Lung Disease Mother         emphysema    Hypertension Mother     Cancer Father         colon     Alzheimer Brother        Social History     Tobacco Use    Smoking status: Former Smoker     Packs/day: 0.50     Years: 30.00     Pack years: 15.00     Quit date: 2000     Years since quittin.2    Smokeless tobacco: Never Used   Substance Use Topics    Alcohol use:  Yes     Alcohol/week: 0.0 standard drinks     Comment: occasional        Lab Results   Component Value Date/Time    WBC 5.1 2020 02:40 PM    HGB 14.9 2020 02:40 PM    HCT 42.6 2020 02:40 PM    PLATELET 697  02:40 PM    MCV 88 2020 02:40 PM     Lab Results   Component Value Date/Time    Cholesterol, total 210 (H) 2020 02:40 PM    HDL Cholesterol 90 2020 02:40 PM    LDL, calculated 110 (H) 2020 02:40 PM    Triglyceride 49 02/28/2020 02:40 PM    CHOL/HDL Ratio 2.3 02/17/2009 04:08 PM     Lab Results   Component Value Date/Time    TSH 1.39 09/11/2020 10:00 AM    T4, Free 1.56 12/11/2015 08:13 AM      Lab Results   Component Value Date/Time    Sodium 139 09/11/2020 10:00 AM    Potassium 4.9 09/11/2020 10:00 AM    Chloride 102 09/11/2020 10:00 AM    CO2 31 09/11/2020 10:00 AM    Anion gap 6 10/07/2012 11:30 AM    Glucose 90 09/11/2020 10:00 AM    BUN 18 09/11/2020 10:00 AM    Creatinine 0.88 09/11/2020 10:00 AM    BUN/Creatinine ratio NOT APPLICABLE 99/87/6848 38:60 AM    GFR est AA 77 09/11/2020 10:00 AM    GFR est non-AA 67 09/11/2020 10:00 AM    Calcium 10.2 09/11/2020 10:00 AM    Bilirubin, total 0.5 02/28/2020 02:40 PM    ALT (SGPT) 18 02/28/2020 02:40 PM    Alk. phosphatase 74 02/28/2020 02:40 PM    Protein, total 7.2 02/28/2020 02:40 PM    Albumin 5.3 (H) 02/28/2020 02:40 PM    Globulin 3.7 10/07/2012 11:30 AM    A-G Ratio 2.8 (H) 02/28/2020 02:40 PM      Lab Results   Component Value Date/Time    Hemoglobin A1c 5.3 04/20/2012 10:34 AM    Hemoglobin A1c (POC) 4.9 05/14/2018 10:15 AM         Review of Systems   Constitutional: Negative for malaise/fatigue. HENT: Negative for congestion. Eyes: Negative for blurred vision. Respiratory: Negative for cough and shortness of breath. Cardiovascular: Negative for chest pain, palpitations and leg swelling. Gastrointestinal: Negative for abdominal pain, constipation and heartburn. Genitourinary: Negative for dysuria, frequency and urgency. Musculoskeletal: Negative for back pain and joint pain. Neurological: Negative for dizziness, tingling and headaches. Endo/Heme/Allergies: Negative for environmental allergies. Psychiatric/Behavioral: Negative for depression. The patient does not have insomnia. Physical Exam  Vitals signs and nursing note reviewed. Constitutional:       Appearance: Normal appearance. She is well-developed. Comments: /73 (BP 1 Location: Left arm, BP Patient Position: Sitting)   Pulse 74   Temp 96.8 °F (36 °C) (Temporal)   Resp 16   Ht 5' 2\" (1.575 m)   Wt 127 lb (57.6 kg)   LMP 01/01/1983   SpO2 98%   BMI 23.23 kg/m²    HENT:      Right Ear: Tympanic membrane and ear canal normal.      Left Ear: Tympanic membrane and ear canal normal.      Nose: No mucosal edema. Neck:      Musculoskeletal: Normal range of motion and neck supple. Thyroid: No thyromegaly. Cardiovascular:      Rate and Rhythm: Normal rate and regular rhythm. Heart sounds: Normal heart sounds. No gallop. Pulmonary:      Effort: Pulmonary effort is normal.      Breath sounds: Normal breath sounds. Abdominal:      General: Bowel sounds are normal.      Palpations: Abdomen is soft. There is no mass. Tenderness: There is no abdominal tenderness. Musculoskeletal:      Left shoulder: She exhibits decreased range of motion, tenderness, bony tenderness and pain. She exhibits no spasm, normal pulse and normal strength. Right lower leg: No edema. Left lower leg: No edema. Comments: Negative impingement   Lymphadenopathy:      Cervical: No cervical adenopathy. Skin:     General: Skin is warm and dry. Neurological:      General: No focal deficit present. Mental Status: She is alert and oriented to person, place, and time. Psychiatric:         Mood and Affect: Mood normal.     Joint injection left shoulder:   Pt has an understanding of this procedure including expected benefits, risks and alternative options for treatment. All questions have been answered     Procedure:  After consent was obtained, using sterile technique the left shoulder joint was prepped using Betadine. Local anesthetic used: ethyl chloride. depomedrol 40 mg was mixed with 2% lidocaine and injected into the joint and the needle withdrawn. The procedure was well tolerated.   The patient is asked to continue to rest the joint for a few more days before resuming regular activities. It may be more painful for the first 1-2 days. Watch for fever, or increased swelling or persistent pain in the joint. Call or return to clinic prn if such. Pt has an understanding of instructions given. ASSESSMENT and PLAN  Diagnoses and all orders for this visit:    1. Essential hypertension  Discussed sodium restriction, high k rich diet, maintaining ideal body weight and regular exercise program such as daily walking 30 min perday 4-5 times per week, as physiologic means to achieve blood pressure control. Medication compliance advised. 2. Hypercholesterolemia  Continue to monitor. Work on diet and exercise.  -     LIPID PANEL; Future    3. Hypothyroidism due to acquired atrophy of thyroid  -     TSH 3RD GENERATION; Future    4. Encounter for medication monitoring  -     CBC W/O DIFF; Future  -     METABOLIC PANEL, COMPREHENSIVE; Future    5. Encounter for chronic pain management//  6. Arthralgia of multiple joints  Overall has been stable with pain in the hand   Rarely using the tramadol.    -     MONITOR SCREEN 10-DRUG CLASS PROFILE  -     COMPLIANCE DRUG SCREEN/PRESCRIPTION MONITORING    7. History of colonic polyps  8. Colon cancer screening  -     REFERRAL TO GASTROENTEROLOGY    9. Tendinitis of left rotator cuff  -     XR SHOULDER LT AP/LAT MIN 2 V; Future  Instructions for exercises given and reviewed with pt. Pt also to use heat (adfter using ice for the first 2 days) to the area 3-4 times a day over the next several days until sx are improved. Follow-up and Dispositions    · Return in about 6 months (around 9/12/2021) for medicare wellness exam.       reviewed diet, exercise and weight control  cardiovascular risk and specific lipid/LDL goals reviewed  reviewed medications and side effects in detail    I have discussed diagnosis listed in this note with pt and/or family.  I have discussed treatment plans and options and the risk/benefit analysis of those options, including safe use of medications and possible medication side effects. Through the use of shared decision making we have agreed to the above plan. The patient has received an after-visit summary and questions were answered concerning future plans and follow up. Advise pt of any urgent changes then to proceed to the ER.

## 2021-03-12 NOTE — LETTER
CONTROLLED SUBSTANCE MEDICATION AGREEMENT Patient Name: Sandy Mak Patient YOB: 1950 I understand, that controlled substance medications may be used to help better manage my symptoms and to improve my ability to function at home, work and in social settings. However, I also understand that these medications do have risks, which have been discussed with me, including possible development of physical or psychological dependence. I understand that successful treatment requires mutual trust and honesty between me and my provider. I understand and agree that following this Medication Agreement is necessary in continuing my provider-patient relationship and the success of my treatment plan. Explanation from my Provider: Benefits and Goals of Controlled Substance Medications: There are two potential goals for your treatment: (1) decreased pain and suffering (2) improved daily life functions. There are many possible treatments for your chronic condition(s). Alternatives such as physical therapy, yoga, massage, home daily exercise, meditation, relaxation techniques, injections, chiropractic manipulations, surgery, cognitive therapy, hypnosis and many medications that are not habit-forming may be used. Use of controlled substance medications may be helpful, but they are unlikely to resolve all symptoms or restore all function. Explanation from my Provider: Risks of Controlled Substance Medications: 
 Opioid pain medications: These medications can lead to problems such as addiction/dependence, sedation, lightheadedness/dizziness, memory issues, falls, constipation, nausea, or vomiting. They may also impair the ability to drive or operate machinery. Additionally, these medications may lower testosterone levels, leading to loss of bone strength, stamina and sex drive.   They may cause problems with breathing, sleep apnea and reduced coughing, which is especially dangerous for patients with lung disease. Overdose or dangerous interactions with alcohol and other medications may occur, leading to death. Hyperalgesia may develop, which means patients receiving opioids for the treatment of pain may become more sensitive to certain painful stimuli, and in some cases, experience pain from ordinarily non-painful stimuli. Women between the ages of 14-53 who could become pregnant should carefully weigh the risks and benefits of opioids with their physicians, as these medications increase the risk of pregnancy complications, including miscarriage,  delivery and stillbirth. It is also possible for babies to be born addicted to opioids. Opioid dependence withdrawal symptoms may include; feelings of uneasiness, increased pain, irritability, belly pain, diarrhea, sweats and goose-flesh. Testosterone replacement therapy:  Potential side effects include increased risk of stroke and heart attack, blood clots, increased blood pressure, increased cholesterol, enlarged prostate, sleep apnea, irritability/aggression and other mood disorders, and decreased fertility. Judah Azul (1950)             Page 1 of 4    Initials:_______ Benzodiazepines and non-benzodiazepine sleep medications: These medications can lead to problems such as addiction/dependence, sedation, fatigue, lightheadedness, dizziness, incoordination, falls, depression, hallucinations, and impaired judgment, memory and concentration. The ability to drive and operate machinery may also be affected. Abnormal sleep-related behaviors have been reported, including sleepwalking, driving, making telephone calls, eating, or having sex while not fully awake. These medications can suppress breathing and worsen sleep apnea, particularly when combined with alcohol or other sedating medications, potentially leading to death. Dependence withdrawal symptoms may include tremors, anxiety, hallucinations and seizures.  
 Stimulants:  Common adverse effects include addiction/dependence, increased blood pressure and heart rate, decreased appetite, nausea, involuntary weight loss, insomnia,  irritability, and headaches. These risks may increase when these medications are combined with other stimulants, such as caffeine pills or energy drinks, certain weight loss supplements and oral decongestants. Dependence withdrawal symptoms may include depressed mood, loss of interest, suicidal thoughts, anxiety, fatigue, appetite changes and agitation. I agree and understand that I and my prescriber have the following rights and responsibilities regarding my treatment plan:  
1. MY RIGHTS: 
To be informed of my treatment and medication plan. To be an active participant in my health and wellbeing. 2. MY RESPONSIBILITY AND UNDERSTANDING FOR USE OF MEDICATIONS  I will take medications at the dose and frequency as directed. For my safety, I will not increase or change how I take my medications without the recommendation of my healthcare provider.  I will actively participate in any program recommended by my provider which may improve function, including social, physical, psychological programs.  I will not take my medications with alcohol or other drugs not prescribed to me. I understand that drinking alcohol with my medications increases the chances of side effects, including reduced breathing rate and could lead to personal injury when operating machinery.  I understand that if I have a history of substance use disorders, including alcohol or other illicit drugs, that I may be at increased risk of addiction to my medications.  I agree to notify my provider immediately if I should become pregnant so that my treatment plan can be adjusted.  
 I agree and understand that I shall only receive controlled substance medications from the prescriber that signed this agreement unless there is written agreement among other prescribers of controlled substances outlining the responsibility of the medications being prescribed.  I understand that the if the controlled medication is not helping to achieve goals, the dosage may be tapered and no longer prescribed. 3. MY RESPONSIBILITY FOR COMMUNICATION / PRESCRIPTION RENEWALS 
 I agree that all controlled substance medications that I take will be prescribed only by my provider. If another healthcare provider prescribes me medication in an emergency, I will notify my provider within seventy-two (72) hours. Jamila Mcginnis ([id number])             Page 2 of 4    Initials:_______  I will arrange for refills at the prescribed interval ONLY during regular office hours. I will not ask for refills earlier than agreed, after-hours, on holidays or weekends. Refills may take up to 72 hours for processing and prescriptions to reach the pharmacy.  I will inform my other health care providers that I am taking these medications and of the existence of this [id number]. In the event of an emergency, I will provide the same information to the emergency department prescribers.  I will keep my provider updated on the pharmacy I am using for controlled medication prescription filling. 4. MY RESPONSIBILITY FOR PROTECTING MEDICATIONS  I will protect my prescriptions and medications. I understand that lost or misplaced prescriptions will not be replaced.  I will keep medications only for my own use and will not share them with others. I will keep all medications away from children.  I agree that if my medications are adjusted or discontinued, I will properly dispose of any remaining medications. I understand that I will be required to dispose of any remaining controlled medications as, directed by my prescriber, prior to being provided with any prescriptions for other controlled medications.   Medication drop box locations can be found at: [employer] 5. MY RESPONSIBILITY WITH ILLEGAL DRUGS  I will not use illegal or street drugs or another person's prescription medications not prescribed to me.  If there are identified addiction type symptoms, then referral to a program may be provided by my provider and I agree to follow through with this recommendation. 6. MY RESPONSIBILITY FOR COOPERATION WITH INVESTIGATIONS  I understand that my provider will comply with any applicable law and may discuss my use and/or possible misuse/abuse of controlled substances and alcohol, as appropriate, with any health care provider involved in my care, pharmacist, or legal authority.  I authorize my provider and pharmacy to cooperate fully with law enforcement agencies (as permitted by law) in the investigation of any possible misuse, sale, or other diversion of my controlled substances.  I agree to waive any applicable privilege or right of privacy or confidentiality with respect to these authorizations. 7. PROVIDERS RIGHT TO MONITOR FOR SAFETY: PRESCRIPTION MONITORING / DRUG TESTING 
 I consent to drug/toxicology screening and will submit to a drug screen upon my providers request to assure I am only taking the prescribed drugs for my safety monitoring. I understand that a drug screen is a laboratory test in which a sample of my urine, blood or saliva is checked to see what drugs I have been taking. This may entail an observed urine specimen, which means that a nurse or other health care provider may watch me provide urine, and I will cooperate if I am asked to provide an observed specimen. Toshia Cisneros (1950)             Page 3 of 4    Initials:_______  I understand that my provider will check a copy of my State Prescription Monitoring Program () Report in order to safely prescribe medications.    
 Pill Counts: I consent to pill counts when requested. I may be asked to bring all my prescribed controlled substance medications, in their original bottles, to all of my scheduled appointments. In addition, my provider may ask me to come to the practice at any time for a random pill count. 8. TERMINATION OF THIS AGREEMENT  For my safety, my prescriber has the right to stop prescribing controlled substance medications and may end this agreement.  Conditions that may result in termination of this agreement: 
a. I do not show any improvement in pain, or my activity has not improved. b. I develop rapid tolerance or loss of improvement, as described in my treatment plan. 
c. I develop significant side effects from the medication. d. My behavior is not consistent with the responsibilities outlined above, thereby causing safety concerns to continue prescribing controlled substance medications. e. I fail to follow the terms of this agreement. f. Other:____________________________ UNDERSTANDING THIS MEDICATION AGREEMENT:   
I have read the above and have had all my questions answered. For chronic disease management, I know that my symptoms can be managed with many types of treatments. A chronic medication trial may be part of my treatment, but I must be an active participant in my care. Medication therapy is only one part of my symptom management plan. In some cases, there may be limited scientific evidence to support the chronic use of certain medications to improve symptoms and daily function. Furthermore, in certain circumstances, there may be scientific information that suggests that the use of chronic controlled substances may worsen my symptoms and increase my risk of unintentional death directly related to this medication therapy. I know that if my provider feels my risk from controlled medications is greater than my benefit, I will have my controlled substance medication(s) compassionately lowered or removed altogether. I further agree to allow this office to contact my HIPAA contact if there are concerns about my safety and use of the controlled medications. I have agreed to use the prescribed controlled substance medications to me as instructed by my provider and as stated in this Medication Agreement. My initial on each page and my signature below shows that I have read each page and I have had the opportunity to ask questions with answers provided by my provider. Patient Name (Printed): _____________________________________ Patient Signature:  ______________________   Date: _____________ Prescriber Name (Printed): ___________________________________ Prescriber Signature: _____________________  Date: _____________ Toshia Cisnerso ([id number])             Page 4 of 4

## 2021-03-13 LAB
ALBUMIN SERPL-MCNC: 5 G/DL (ref 3.7–4.7)
ALBUMIN/GLOB SERPL: 2.2 {RATIO} (ref 1.2–2.2)
ALP SERPL-CCNC: 82 IU/L (ref 39–117)
ALT SERPL-CCNC: 13 IU/L (ref 0–32)
AMPHETAMINES UR QL SCN: NEGATIVE NG/ML
AST SERPL-CCNC: 18 IU/L (ref 0–40)
BARBITURATES UR QL SCN: NEGATIVE NG/ML
BENZODIAZ UR QL SCN: POSITIVE NG/ML
BILIRUB SERPL-MCNC: 0.5 MG/DL (ref 0–1.2)
BUN SERPL-MCNC: 18 MG/DL (ref 8–27)
BUN/CREAT SERPL: 23 (ref 12–28)
BZE UR QL SCN: NEGATIVE NG/ML
CALCIUM SERPL-MCNC: 10.3 MG/DL (ref 8.7–10.3)
CANNABINOIDS UR QL SCN: NEGATIVE NG/ML
CHLORIDE SERPL-SCNC: 103 MMOL/L (ref 96–106)
CHOLEST SERPL-MCNC: 219 MG/DL (ref 100–199)
CO2 SERPL-SCNC: 23 MMOL/L (ref 20–29)
CREAT SERPL-MCNC: 0.79 MG/DL (ref 0.57–1)
CREAT UR-MCNC: 40.2 MG/DL (ref 20–300)
ERYTHROCYTE [DISTWIDTH] IN BLOOD BY AUTOMATED COUNT: 12.6 % (ref 11.7–15.4)
GLOBULIN SER CALC-MCNC: 2.3 G/DL (ref 1.5–4.5)
GLUCOSE SERPL-MCNC: 93 MG/DL (ref 65–99)
HCT VFR BLD AUTO: 44.5 % (ref 34–46.6)
HDLC SERPL-MCNC: 85 MG/DL
HGB BLD-MCNC: 14.8 G/DL (ref 11.1–15.9)
IMP & REVIEW OF LAB RESULTS: NORMAL
LDLC SERPL CALC-MCNC: 125 MG/DL (ref 0–99)
MCH RBC QN AUTO: 29.7 PG (ref 26.6–33)
MCHC RBC AUTO-ENTMCNC: 33.3 G/DL (ref 31.5–35.7)
MCV RBC AUTO: 89 FL (ref 79–97)
METHADONE UR QL SCN: NEGATIVE NG/ML
OPIATES UR QL SCN: NEGATIVE NG/ML
OXYCODONE+OXYMORPHONE UR QL SCN: NEGATIVE NG/ML
PCP UR QL: NEGATIVE NG/ML
PH UR: 5.7 [PH] (ref 4.5–8.9)
PLATELET # BLD AUTO: 299 X10E3/UL (ref 150–450)
PLEASE NOTE:, 733163: ABNORMAL
POTASSIUM SERPL-SCNC: 5.1 MMOL/L (ref 3.5–5.2)
PROPOXYPH UR QL SCN: NEGATIVE NG/ML
PROT SERPL-MCNC: 7.3 G/DL (ref 6–8.5)
RBC # BLD AUTO: 4.98 X10E6/UL (ref 3.77–5.28)
SODIUM SERPL-SCNC: 142 MMOL/L (ref 134–144)
TRIGL SERPL-MCNC: 52 MG/DL (ref 0–149)
TSH SERPL DL<=0.005 MIU/L-ACNC: 2.63 UIU/ML (ref 0.45–4.5)
VLDLC SERPL CALC-MCNC: 9 MG/DL (ref 5–40)
WBC # BLD AUTO: 7.5 X10E3/UL (ref 3.4–10.8)

## 2021-03-17 LAB — DRUGS UR: NORMAL

## 2021-03-26 RX ORDER — MUPIROCIN 20 MG/G
OINTMENT TOPICAL DAILY
Qty: 22 G | Refills: 0 | Status: SHIPPED | OUTPATIENT
Start: 2021-03-26 | End: 2021-06-30 | Stop reason: CLARIF

## 2021-03-31 DIAGNOSIS — F41.9 ANXIETY: ICD-10-CM

## 2021-03-31 RX ORDER — ALPRAZOLAM 1 MG/1
TABLET ORAL
Qty: 60 TAB | Refills: 0 | Status: SHIPPED | OUTPATIENT
Start: 2021-03-31 | End: 2021-04-30

## 2021-04-29 DIAGNOSIS — F41.9 ANXIETY: ICD-10-CM

## 2021-04-30 RX ORDER — ALPRAZOLAM 1 MG/1
TABLET ORAL
Qty: 60 TAB | Refills: 0 | Status: SHIPPED | OUTPATIENT
Start: 2021-05-01 | End: 2021-05-27

## 2021-05-27 DIAGNOSIS — F41.9 ANXIETY: ICD-10-CM

## 2021-05-27 RX ORDER — ALPRAZOLAM 1 MG/1
TABLET ORAL
Qty: 60 TABLET | Refills: 1 | Status: SHIPPED | OUTPATIENT
Start: 2021-05-28 | End: 2021-07-30

## 2021-06-27 ENCOUNTER — HOSPITAL ENCOUNTER (OUTPATIENT)
Dept: PREADMISSION TESTING | Age: 71
Discharge: HOME | End: 2021-06-27
Payer: MEDICARE

## 2021-06-27 PROCEDURE — U0003 INFECTIOUS AGENT DETECTION BY NUCLEIC ACID (DNA OR RNA); SEVERE ACUTE RESPIRATORY SYNDROME CORONAVIRUS 2 (SARS-COV-2) (CORONAVIRUS DISEASE [COVID-19]), AMPLIFIED PROBE TECHNIQUE, MAKING USE OF HIGH THROUGHPUT TECHNOLOGIES AS DESCRIBED BY CMS-2020-01-R: HCPCS

## 2021-06-28 LAB
SARS-COV-2, XPLCVT: NOT DETECTED
SOURCE, COVRS: NORMAL

## 2021-07-01 ENCOUNTER — ANESTHESIA EVENT (OUTPATIENT)
Dept: ENDOSCOPY | Age: 71
End: 2021-07-01
Payer: MEDICARE

## 2021-07-01 ENCOUNTER — ANESTHESIA (OUTPATIENT)
Dept: ENDOSCOPY | Age: 71
End: 2021-07-01
Payer: MEDICARE

## 2021-07-01 ENCOUNTER — HOSPITAL ENCOUNTER (OUTPATIENT)
Age: 71
Setting detail: OUTPATIENT SURGERY
Discharge: HOME | End: 2021-07-01
Attending: INTERNAL MEDICINE | Admitting: INTERNAL MEDICINE
Payer: MEDICARE

## 2021-07-01 VITALS
BODY MASS INDEX: 23.22 KG/M2 | HEART RATE: 79 BPM | WEIGHT: 123 LBS | DIASTOLIC BLOOD PRESSURE: 76 MMHG | OXYGEN SATURATION: 98 % | RESPIRATION RATE: 11 BRPM | SYSTOLIC BLOOD PRESSURE: 141 MMHG | TEMPERATURE: 97.9 F | HEIGHT: 61 IN

## 2021-07-01 PROCEDURE — 2709999900 HC NON-CHARGEABLE SUPPLY: Performed by: INTERNAL MEDICINE

## 2021-07-01 PROCEDURE — 74011250636 HC RX REV CODE- 250/636: Performed by: ANESTHESIOLOGY

## 2021-07-01 PROCEDURE — 76040000019: Performed by: INTERNAL MEDICINE

## 2021-07-01 PROCEDURE — 76060000031 HC ANESTHESIA FIRST 0.5 HR: Performed by: INTERNAL MEDICINE

## 2021-07-01 PROCEDURE — 74011250636 HC RX REV CODE- 250/636: Performed by: INTERNAL MEDICINE

## 2021-07-01 RX ORDER — PROPOFOL 10 MG/ML
INJECTION, EMULSION INTRAVENOUS AS NEEDED
Status: DISCONTINUED | OUTPATIENT
Start: 2021-07-01 | End: 2021-07-01 | Stop reason: HOSPADM

## 2021-07-01 RX ORDER — SODIUM CHLORIDE 0.9 % (FLUSH) 0.9 %
5-40 SYRINGE (ML) INJECTION EVERY 8 HOURS
Status: DISCONTINUED | OUTPATIENT
Start: 2021-07-01 | End: 2021-07-01 | Stop reason: HOSPADM

## 2021-07-01 RX ORDER — DEXTROMETHORPHAN/PSEUDOEPHED 2.5-7.5/.8
1.2 DROPS ORAL
Status: DISCONTINUED | OUTPATIENT
Start: 2021-07-01 | End: 2021-07-01 | Stop reason: HOSPADM

## 2021-07-01 RX ORDER — MIDAZOLAM HYDROCHLORIDE 1 MG/ML
.25-5 INJECTION, SOLUTION INTRAMUSCULAR; INTRAVENOUS
Status: DISCONTINUED | OUTPATIENT
Start: 2021-07-01 | End: 2021-07-01 | Stop reason: HOSPADM

## 2021-07-01 RX ORDER — FENTANYL CITRATE 50 UG/ML
25 INJECTION, SOLUTION INTRAMUSCULAR; INTRAVENOUS
Status: DISCONTINUED | OUTPATIENT
Start: 2021-07-01 | End: 2021-07-01 | Stop reason: HOSPADM

## 2021-07-01 RX ORDER — SODIUM CHLORIDE 9 MG/ML
75 INJECTION, SOLUTION INTRAVENOUS CONTINUOUS
Status: DISCONTINUED | OUTPATIENT
Start: 2021-07-01 | End: 2021-07-01 | Stop reason: HOSPADM

## 2021-07-01 RX ORDER — LIDOCAINE HYDROCHLORIDE 20 MG/ML
INJECTION, SOLUTION EPIDURAL; INFILTRATION; INTRACAUDAL; PERINEURAL AS NEEDED
Status: DISCONTINUED | OUTPATIENT
Start: 2021-07-01 | End: 2021-07-01

## 2021-07-01 RX ORDER — FLUMAZENIL 0.1 MG/ML
0.2 INJECTION INTRAVENOUS
Status: DISCONTINUED | OUTPATIENT
Start: 2021-07-01 | End: 2021-07-01 | Stop reason: HOSPADM

## 2021-07-01 RX ORDER — SODIUM CHLORIDE 0.9 % (FLUSH) 0.9 %
5-40 SYRINGE (ML) INJECTION AS NEEDED
Status: DISCONTINUED | OUTPATIENT
Start: 2021-07-01 | End: 2021-07-01 | Stop reason: HOSPADM

## 2021-07-01 RX ORDER — ATROPINE SULFATE 0.1 MG/ML
0.5 INJECTION INTRAVENOUS
Status: DISCONTINUED | OUTPATIENT
Start: 2021-07-01 | End: 2021-07-01 | Stop reason: HOSPADM

## 2021-07-01 RX ORDER — NALOXONE HYDROCHLORIDE 0.4 MG/ML
0.4 INJECTION, SOLUTION INTRAMUSCULAR; INTRAVENOUS; SUBCUTANEOUS
Status: DISCONTINUED | OUTPATIENT
Start: 2021-07-01 | End: 2021-07-01 | Stop reason: HOSPADM

## 2021-07-01 RX ORDER — EPINEPHRINE 0.1 MG/ML
1 INJECTION INTRACARDIAC; INTRAVENOUS
Status: DISCONTINUED | OUTPATIENT
Start: 2021-07-01 | End: 2021-07-01 | Stop reason: HOSPADM

## 2021-07-01 RX ADMIN — SODIUM CHLORIDE 75 ML/HR: 9 INJECTION, SOLUTION INTRAVENOUS at 08:59

## 2021-07-01 RX ADMIN — PROPOFOL 250 MG: 10 INJECTION, EMULSION INTRAVENOUS at 09:25

## 2021-07-01 NOTE — DISCHARGE INSTRUCTIONS
Juan F Roldan  [id number]  1950    COLON DISCHARGE INSTRUCTIONS  Discomfort:  Redness at IV site- apply warm compress to area; if redness or soreness persist- contact your physician  There may be a slight amount of blood passed from the rectum  Gaseous discomfort- walking, belching will help relieve any discomfort  You may not operate a vehicle for 12 hours  You may not engage in an occupation involving machinery or appliances for rest of today  You may not drink alcoholic beverages for at least 12 hours  Avoid making any critical decisions for at least 24 hour  DIET:   High fiber diet. - however -  remember your colon is empty and a heavy meal will produce gas. Avoid these foods:  vegetables, fried / greasy foods, carbonated drinks for today  MEDICATION:         ACTIVITY:  You may not resume your normal daily activities until tomorrow AM; it is recommended that you spend the remainder of the day resting -  avoid any strenuous activity. CALL M.D.   ANY SIGN OF:   Increasing pain, nausea, vomiting  Abdominal distension (swelling)  New increased bleeding (oral or rectal)  Fever (chills)  Pain in chest area  Bloody discharge from nose or mouth  Shortness of breath    IMPRESSION:  -Normal terminal ileum  -Left-side colon diverticulosis  -Small grade 1 internal hemorrhoids  -No masses, polyps, or inflammation noted    Follow-up Instructions:   Call Dr. Abraham Holbrook if questions arise regarding your procedure  Telephone # [phone]  Repeat colonoscopy in 5 years    Linsey Vick MD

## 2021-07-01 NOTE — H&P
Gastroenterology Outpatient History and Physical    Patient: Eddy Zaidi    Physician: Ratna Hernandez MD    Chief Complaint:  Fam hx CRC (F); Pers hx colon polyps  History of Present Illness: 77yo F with Fam hx CRC (F); Pers hx colon polyps. Last colonoscopy 3/2015    History:  Past Medical History:   Diagnosis Date    Anxiety 4/15/2010    Environmental allergies     GERD (gastroesophageal reflux disease)     Hypothyroidism 4/15/2010    Lung nodule     Menopause 4/15/2010    Osteoporosis     Pneumonia     Thyroid disease       Past Surgical History:   Procedure Laterality Date    HX APPENDECTOMY      HX HYSTERECTOMY      [place] COLONOSCOPY FLX DX W/COLLJ SPEC WHEN [facility]MD  2010    [facility]      Social History     Socioeconomic History    Marital status:      Spouse name: Not on file    Number of children: Not on file    Years of education: Not on file    Highest education level: Not on file   Tobacco Use    Smoking status: Former Smoker     Packs/day: 0.50     Years: 30.00     Pack years: 15.00     Quit date: 2000     Years since quittin.5    Smokeless tobacco: Never Used   Vaping Use    Vaping Use: Never used   Substance and Sexual Activity    Alcohol use: Not Currently     Alcohol/week: 0.0 standard drinks    Drug use: No    Sexual activity: Yes     Partners: Male     Birth control/protection: None     Social Determinants of Health     Financial Resource Strain:     Difficulty of Paying Living Expenses:    Food Insecurity:     Worried About Running Out of Food in the Last Year:     Ran Out of Food in the Last Year:    Transportation Needs:     Lack of Transportation (Medical):      Lack of Transportation (Non-Medical):    Physical Activity:     Days of Exercise per Week:     Minutes of Exercise per Session:    Stress:     Feeling of Stress :    Social Connections:     Frequency of Communication with Friends and Family:     Frequency of Social Gatherings with Friends and Family:     Attends Congregational Services:     Active Member of Clubs or Organizations:     Attends Club or Organization Meetings:     Marital Status:       Family History   Problem Relation Age of Onset    Lung Disease Mother         emphysema    Hypertension Mother     Cancer Father         colon     Alzheimer Brother       Patient Active Problem List   Diagnosis Code    Menopause Z78.0    Hypothyroidism E03.9    Anxiety F41.9    GERD (gastroesophageal reflux disease) K21.9    Lung nodule R91.1    Degenerative arthritis of thumb M18.10       Allergies: Allergies   Allergen Reactions    Codeine Nausea and Vomiting    Mepivacaine Other (comments)     Mepivacaine 3%   Facial swelling    Novocain [Procaine] Swelling     Facial swelling     Medications:   Prior to Admission medications    Medication Sig Start Date End Date Taking? Authorizing Provider   ALPRAZolam Mannie Vaca) 1 mg tablet TAKE 1 TABLET BY MOUTH TWICE A DAY AS NEEDED. MAX: 2 TABS PER DAY 5/28/21  Yes Miles Diaz MD   omeprazole (PRILOSEC) 20 mg capsule TAKE 1 CAPSULE BY MOUTH EVERY DAY 1/22/21  Yes Miles Diaz MD   amLODIPine (NORVASC) 2.5 mg tablet TAKE 2 TABS BY MOUTH DAILY. FOR HIGH BLOOD PRESSURE. 1/8/21  Yes Miriam Steven MD   levothyroxine (SYNTHROID) 75 mcg tablet Take 1 Tab by mouth Daily (before breakfast). 9/11/20  Yes Miriam Steven MD   calcium-cholecalciferol, d3, (CALCIUM 600 + D) 600-125 mg-unit tab Take 1 Tab by mouth daily. Yes Provider, Historical   traMADol (ULTRAM) 50 mg tablet Take 50 mg by mouth every six (6) hours as needed for Pain. Yes Provider, Historical   vitamin E acetate (VITAMIN E PO) Take 400 mg by mouth daily. Yes Provider, Historical   black cohosh 40 mg tab Take 1 Tab by mouth daily. Yes Provider, Historical   fluticasone (FLONASE) 50 mcg/actuation nasal spray USE TWO SPRAYS IN EACH NOSTRIL DAILY   Patient taking differently: daily as needed.  8/9/15 Yes Lenora Peterson MD     Physical Exam:   Vital Signs: Blood pressure (!) 167/78, pulse 98, temperature 97.8 °F (36.6 °C), resp. rate 19, height 5' 1\" (1.549 m), weight 55.8 kg (123 lb), last menstrual period 01/01/1983, SpO2 99 %, not currently breastfeeding.   General: well developed, well nourished   HEENT: unremarkable   Heart: regular rhythm no mumur    Lungs: clear   Abdominal:  benign   Neurological: unremarkable   Extremities: no edema     Findings/Diagnosis: Fam hx cRC (F); Pers hx colon polyps  Plan of Care/Planned Procedure: colonoscopy with conscious/deep sedation    Signed:  Román Marquis MD 7/1/2021

## 2021-07-01 NOTE — PROCEDURES
NAME:  Kelsey Bain   :   1950   MRN:   [id number]     Date/Time:  2021 9:31 AM    Colonoscopy Operative Report    Procedure Type:   Colonoscopy --screening     Indications:     Family history of coloretal cancer (screening only), Personal history of colon polyps (screening only)  Pre-operative Diagnosis: see indication above  Post-operative Diagnosis:  See findings below  :  Noah Ellis MD  Referring Provider: Dario Tariq MD    Exam:  Airway: clear, no airway problems anticipated  Heart: RRR, without gallops or rubs  Lungs: clear bilaterally without wheezes, crackles, or rhonchi  Abdomen: soft, nontender, nondistended, bowel sounds present  Mental Status: awake, alert and oriented to person, place and time    Sedation:  MAC anesthesia Propofol 250mg IV  Procedure Details:  After informed consent was obtained with all risks and benefits of procedure explained and preoperative exam completed, the patient was taken to the endoscopy suite and placed in the left lateral decubitus position. Upon sequential sedation as per above, a digital rectal exam was performed demonstrating internal hemorrhoids. The Olympus videocolonoscope  was inserted in the rectum and carefully advanced to the cecum, which was identified by the ileocecal valve and appendiceal orifice. The distal terminal ileum was evaluated. The quality of preparation was adequate. The colonoscope was slowly withdrawn with careful evaluation between folds. Retroflexion in the rectum was completed demonstrating internal hemorrhoids. Findings:     -Normal terminal ileum  -Left-side colon diverticulosis  -Small grade 1 internal hemorrhoids  -No masses, polyps, or inflammation noted    Specimen Removed:  None. Complications: None. EBL:  None.     Impression:    -Normal terminal ileum  -Left-side colon diverticulosis  -Small grade 1 internal hemorrhoids  -No masses, polyps, or inflammation noted    Recommendations: --Repeat colonoscopy in 5 years. High fiber diet. Resume normal medication(s). Discharge Disposition:  Home in the company of a  when able to ambulate.     Shawn Laboy MD

## 2021-07-01 NOTE — PROGRESS NOTES
Marcelina Burch  1950  [id number]    Situation:  Verbal report received from: Judge Tang  Procedure: Procedure(s):  COLONOSCOPY    Background:    Preoperative diagnosis: PERSONAL HISTORY POLYPS  Postoperative diagnosis: Diverticulosis, hemorrhoids, personal history of colon polyps. family history of colon Ca    :  Dr. Fanny Chavarria  Assistant(s): Endoscopy Technician-1: Andreea Godinez  Endoscopy RN-1: Vivienne Moore    Specimens: * No specimens in log *  H. Pylori  no    Assessment:  Intra-procedure medications   Anesthesia gave intra-procedure sedation and medications, see anesthesia flow sheet yes    Intravenous fluids: NS@ KVO     Vital signs stable yes    Abdominal assessment: round and soft yes    Recommendation:  Discharge patient per MD order yes.   Return to floor n/a  Family or [employer],   Permission to share finding with family or friend yes

## 2021-07-01 NOTE — ANESTHESIA POSTPROCEDURE EVALUATION
Procedure(s):  COLONOSCOPY.    total IV anesthesia    Anesthesia Post Evaluation        Patient location during evaluation: PACU  Note status: Adequate. Level of consciousness: responsive to verbal stimuli and sleepy but conscious  Pain management: satisfactory to patient  Airway patency: patent  Anesthetic complications: no  Cardiovascular status: acceptable  Respiratory status: acceptable  Hydration status: acceptable  Comments: +Post-Anesthesia Evaluation and Assessment    Patient: Jose Bautista MRN: [id number]  SSN: [id number]   YOB: 1950  Age: 70 y.o. Sex: female      Cardiovascular Function/Vital Signs    BP (!) 141/76   Pulse 79   Temp 36.6 °C (97.9 °F)   Resp 11   Ht 5' 1\" (1.549 m)   Wt 55.8 kg (123 lb)   SpO2 98%   Breastfeeding No   BMI 23.24 kg/m²     Patient is status post Procedure(s):  COLONOSCOPY. Nausea/Vomiting: Controlled. Postoperative hydration reviewed and adequate. Pain:  Pain Scale 1: Numeric (0 - 10) (07/01/21 0950)  Pain Intensity 1: 0 (07/01/21 0950)   Managed. Neurological Status: At baseline. Mental Status and Level of Consciousness: Arousable. Pulmonary Status:   O2 Device: None (Room air) (07/01/21 0950)   Adequate oxygenation and airway patent. Complications related to anesthesia: None    Post-anesthesia assessment completed. No concerns. Signed By: Isidoro Mancia DO    7/1/2021  Post anesthesia nausea and vomiting:  controlled      INITIAL Post-op Vital signs:   Vitals Value Taken Time   /76 07/01/21 0951   Temp 36.6 °C (97.9 °F) 07/01/21 0933   Pulse 80 07/01/21 0953   Resp 15 07/01/21 0953   SpO2 96 % 07/01/21 0953   Vitals shown include unvalidated device data.

## 2021-07-01 NOTE — ANESTHESIA PREPROCEDURE EVALUATION
Relevant Problems   No relevant active problems       Anesthetic History   No history of anesthetic complications            Review of Systems / Medical History  Patient summary reviewed, nursing notes reviewed and pertinent labs reviewed    Pulmonary  Within defined limits                 Neuro/Psych         Psychiatric history     Cardiovascular  Within defined limits                Exercise tolerance: >4 METS     GI/Hepatic/Renal     GERD           Endo/Other      Hypothyroidism  Arthritis     Other Findings              Physical Exam    Airway  Mallampati: I  TM Distance: 4 - 6 cm  Neck ROM: normal range of motion   Mouth opening: Normal     Cardiovascular  Regular rate and rhythm,  S1 and S2 normal,  no murmur, click, rub, or gallop             Dental  No notable dental hx       Pulmonary  Breath sounds clear to auscultation               Abdominal  GI exam deferred       Other Findings            Anesthetic Plan    ASA: 2  Anesthesia type: MAC          Induction: Intravenous  Anesthetic plan and risks discussed with: Patient

## 2021-07-01 NOTE — PROGRESS NOTES
Endoscope was pre-cleaned at the bedside immediately following procedure by [employer] [employer]. For medications administered by anesthesia, see anesthesia chart.

## 2021-07-09 ENCOUNTER — TRANSCRIBE ORDER (OUTPATIENT)
Dept: SCHEDULING | Age: 71
End: 2021-07-09

## 2021-07-09 DIAGNOSIS — Z12.31 SCREENING MAMMOGRAM FOR HIGH-RISK PATIENT: Primary | ICD-10-CM

## 2021-07-30 DIAGNOSIS — F41.9 ANXIETY: ICD-10-CM

## 2021-07-30 RX ORDER — ALPRAZOLAM 1 MG/1
TABLET ORAL
Qty: 60 TABLET | Refills: 1 | Status: SHIPPED | OUTPATIENT
Start: 2021-07-30 | End: 2021-09-28

## 2021-08-13 ENCOUNTER — HOSPITAL ENCOUNTER (OUTPATIENT)
Dept: MAMMOGRAPHY | Age: 71
Discharge: HOME | End: 2021-08-13
Attending: FAMILY MEDICINE
Payer: MEDICARE

## 2021-08-13 DIAGNOSIS — Z12.31 SCREENING MAMMOGRAM FOR HIGH-RISK PATIENT: ICD-10-CM

## 2021-08-13 PROCEDURE — 77067 SCR MAMMO BI INCL CAD: CPT

## 2021-08-30 ENCOUNTER — TELEPHONE (OUTPATIENT)
Dept: FAMILY MEDICINE CLINIC | Age: 71
End: 2021-08-30

## 2021-08-30 NOTE — TELEPHONE ENCOUNTER
Patient wants a z pack called into the pharmacy  And dr Ana Rodrigues said no. .the nurse will call the [facility] and do a screening over the phone. Symptom Runny nose sinus and congestion in her chest  No fever.     Call the patient thank you [phone] ike clay 1950

## 2021-08-31 NOTE — TELEPHONE ENCOUNTER
Spoke with patient and requesting a zpak. Patient c/o cough, nasal congestion, and headaches. Patient reports that she has not had her COVID vaccines and not sure if she wants to receive them. Informed patient she would need a virtual visit and offered an appt for tomorrow to come get tested for COVID at [facility]. Patient stated she did not have transportation and did not want to be tested. Patient stated she has an appt 9/13/2021 but informed patient if she still has the symptoms she will have to do a virtual and suggest to get tested and if symptoms get worse go to [facility]. Patient verbalized understanding.

## 2021-09-13 ENCOUNTER — OFFICE VISIT (OUTPATIENT)
Dept: FAMILY MEDICINE CLINIC | Age: 71
End: 2021-09-13
Payer: MEDICARE

## 2021-09-13 VITALS
HEART RATE: 76 BPM | TEMPERATURE: 98 F | HEIGHT: 61 IN | DIASTOLIC BLOOD PRESSURE: 82 MMHG | RESPIRATION RATE: 20 BRPM | SYSTOLIC BLOOD PRESSURE: 122 MMHG | BODY MASS INDEX: 23.75 KG/M2 | WEIGHT: 125.8 LBS | OXYGEN SATURATION: 94 %

## 2021-09-13 DIAGNOSIS — Z00.00 MEDICARE ANNUAL WELLNESS VISIT, SUBSEQUENT: Primary | ICD-10-CM

## 2021-09-13 DIAGNOSIS — Z23 NEEDS FLU SHOT: ICD-10-CM

## 2021-09-13 DIAGNOSIS — G89.29 ENCOUNTER FOR CHRONIC PAIN MANAGEMENT: ICD-10-CM

## 2021-09-13 DIAGNOSIS — I10 ESSENTIAL HYPERTENSION: ICD-10-CM

## 2021-09-13 DIAGNOSIS — E03.4 HYPOTHYROIDISM DUE TO ACQUIRED ATROPHY OF THYROID: ICD-10-CM

## 2021-09-13 DIAGNOSIS — E78.00 HYPERCHOLESTEROLEMIA: ICD-10-CM

## 2021-09-13 DIAGNOSIS — M25.50 ARTHRALGIA OF MULTIPLE JOINTS: ICD-10-CM

## 2021-09-13 DIAGNOSIS — Z51.81 ENCOUNTER FOR MEDICATION MONITORING: ICD-10-CM

## 2021-09-13 DIAGNOSIS — Z28.21 COVID-19 VACCINATION DECLINED: ICD-10-CM

## 2021-09-13 PROCEDURE — G0439 PPPS, SUBSEQ VISIT: HCPCS | Performed by: FAMILY MEDICINE

## 2021-09-13 PROCEDURE — G8536 NO DOC ELDER MAL SCRN: HCPCS | Performed by: FAMILY MEDICINE

## 2021-09-13 PROCEDURE — G8427 DOCREV CUR MEDS BY ELIG CLIN: HCPCS | Performed by: FAMILY MEDICINE

## 2021-09-13 PROCEDURE — G8510 SCR DEP NEG, NO PLAN REQD: HCPCS | Performed by: FAMILY MEDICINE

## 2021-09-13 PROCEDURE — G8399 PT W/DXA RESULTS DOCUMENT: HCPCS | Performed by: FAMILY MEDICINE

## 2021-09-13 PROCEDURE — G8754 DIAS BP LESS 90: HCPCS | Performed by: FAMILY MEDICINE

## 2021-09-13 PROCEDURE — 99213 OFFICE O/P EST LOW 20 MIN: CPT | Performed by: FAMILY MEDICINE

## 2021-09-13 PROCEDURE — 1101F PT FALLS ASSESS-DOCD LE1/YR: CPT | Performed by: FAMILY MEDICINE

## 2021-09-13 PROCEDURE — 1090F PRES/ABSN URINE INCON ASSESS: CPT | Performed by: FAMILY MEDICINE

## 2021-09-13 PROCEDURE — G9899 SCRN MAM PERF RSLTS DOC: HCPCS | Performed by: FAMILY MEDICINE

## 2021-09-13 PROCEDURE — G8752 SYS BP LESS 140: HCPCS | Performed by: FAMILY MEDICINE

## 2021-09-13 PROCEDURE — G0463 HOSPITAL OUTPT CLINIC VISIT: HCPCS | Performed by: FAMILY MEDICINE

## 2021-09-13 PROCEDURE — 90694 VACC AIIV4 NO PRSRV 0.5ML IM: CPT | Performed by: FAMILY MEDICINE

## 2021-09-13 PROCEDURE — G8420 CALC BMI NORM PARAMETERS: HCPCS | Performed by: FAMILY MEDICINE

## 2021-09-13 PROCEDURE — 3017F COLORECTAL CA SCREEN DOC REV: CPT | Performed by: FAMILY MEDICINE

## 2021-09-13 RX ORDER — MUPIROCIN 20 MG/G
OINTMENT TOPICAL 2 TIMES DAILY
COMMUNITY
End: 2021-12-14 | Stop reason: SDUPTHER

## 2021-09-13 RX ORDER — GUAIFENESIN AND DEXTROMETHORPHAN HYDROBROMIDE 1200; 60 MG/1; MG/1
1 TABLET, EXTENDED RELEASE ORAL
COMMUNITY
End: 2022-09-14 | Stop reason: ALTCHOICE

## 2021-09-13 RX ORDER — LORATADINE AND PSEUDOEPHEDRINE SULFATE 10; 240 MG/1; MG/1
1 TABLET, EXTENDED RELEASE ORAL DAILY
COMMUNITY

## 2021-09-13 NOTE — PROGRESS NOTES
Chief Complaint   Patient presents with   Ann Marie Humphries Annual Wellness Visit     medicare wellness     1. Have you been to the ER, urgent care clinic since your last visit? Hospitalized since your last visit?no    2. Have you seen or consulted any other health care providers outside of the [address] [place] [address] since your last visit? Include any pap smears or colon screening.  no

## 2021-09-13 NOTE — PROGRESS NOTES
This is a Subsequent Medicare Annual Wellness Exam (AWV) (Performed 12 months after IPPE or effective date of Medicare Part B enrollment)    I have reviewed the patient's medical history in detail and updated the computerized patient record. History     Patient Active Problem List   Diagnosis Code    Menopause Z78.0    Hypothyroidism E03.9    Anxiety F41.9    GERD (gastroesophageal reflux disease) K21.9    Lung nodule R91.1    Degenerative arthritis of thumb M18.10       Current Outpatient Medications   Medication Sig Dispense Refill    ALPRAZolam (XANAX) 1 mg tablet TAKE 1 TABLET BY MOUTH TWICE A DAY AS NEEDED. MAXIMUM 2 TABLETS PER DAY 60 Tablet 1    omeprazole (PRILOSEC) 20 mg capsule TAKE 1 CAPSULE BY MOUTH EVERY DAY 90 Cap 3    amLODIPine (NORVASC) 2.5 mg tablet TAKE 2 TABS BY MOUTH DAILY. FOR HIGH BLOOD PRESSURE. 180 Tab 2    levothyroxine (SYNTHROID) 75 mcg tablet Take 1 Tab by mouth Daily (before breakfast). 90 Tab 3    calcium-cholecalciferol, d3, (CALCIUM 600 + D) 600-125 mg-unit tab Take 1 Tab by mouth daily.  traMADol (ULTRAM) 50 mg tablet Take 50 mg by mouth every six (6) hours as needed for Pain.  vitamin E acetate (VITAMIN E PO) Take 400 mg by mouth daily.  black cohosh 40 mg tab Take 1 Tab by mouth daily.       fluticasone (FLONASE) 50 mcg/actuation nasal spray USE TWO SPRAYS IN EACH NOSTRIL DAILY  (Patient taking differently: daily as needed.) 16 g 10       Allergies   Allergen Reactions    Codeine Nausea and Vomiting    Mepivacaine Other (comments)     Mepivacaine 3%   Facial swelling    Novocain [Procaine] Swelling     Facial swelling       Past Medical History:   Diagnosis Date    Anxiety 4/15/2010    Environmental allergies     GERD (gastroesophageal reflux disease)     Hypothyroidism 4/15/2010    Lung nodule     Menopause 4/15/2010    Osteoporosis     Pneumonia     Thyroid disease        Past Surgical History:   Procedure Laterality Date    COLONOSCOPY N/A 2021    [facility]  2021         HX APPENDECTOMY      HX HYSTERECTOMY      ID COLONOSCOPY FLX DX W/COLLJ Katarzyna Betancourt [address] WHEN PFRMD  2010    [facility]       Family History   Problem Relation Age of Onset    Lung Disease Mother         emphysema    Hypertension Mother     Cancer Father         colon     Alzheimer Brother        Social History     Tobacco Use    Smoking status: Former Smoker     Packs/day: 0.50     Years: 30.00     Pack years: 15.00     Quit date: 2000     Years since quittin.7    Smokeless tobacco: Never Used   Substance Use Topics    Alcohol use: Not Currently     Alcohol/week: 0.0 standard drinks         Depression Risk Factor Screening:     PHQ over the last two weeks    Little interest or pleasure in doing things Not at all   Feeling down, depressed or hopeless Not at all   Total Score PHQ 2 0     Alcohol Risk Factor Screening: You do not drink alcohol or very rarely. Functional Ability and Level of Safety:   Hearing Loss  Hearing is good. Activities of Daily Living  The home contains: discussed safety equipment. Patient does total self care    Fall RiskFall Risk Assessment, last 12 mths    Able to walk? Yes   Fall in past 12 months? No     Functional Ability:   Does the patient exhibit a steady gait? yes    How long did it take the patient to get up and walk from a sitting position? seconds    Is the patient self reliant? (ie can do own laundry, meals, household chores)  yes   Does the patient handle his/her own medications? yes   Does the patient handle his/her own money? yes   Is the patients home safe (ie good lighting, handrails on stairs and bath, etc.)? yes   Did you notice or did patient express any hearing difficulties? no   Did you notice or did patient express any vision difficulties?   no        Advance Care Planning:   Patient was offered the opportunity to discuss advance care planning:  yes    Does patient have an Advance Directive:  no   If no, did you provide information on Caring Connections? yes      Abuse Screen  Patient is not abused    Cognitive Screening   Evaluation of Cognitive Function:  Has your family/caregiver stated any concerns about your memory: no      Patient Care Team   Patient Care Team:  Fabien Arias MD as PCP - General    Assessment/Plan   Education and counseling provided:  Are appropriate based on today's review and evaluation  End-of-Life planning (with patient's consent)    ASSESSMENT and PLAN    Medicare Annual Wellness  Continue current treatment plan. Continue annual follow up. I have discussed diagnosis listed in this note with pt and/or family. I have discussed treatment plans and options and the risk/benefit analysis of those options, including safe use of medications and possible medication side effects. Through the use of shared decision making we have agreed to the above plan. The patient has received an after-visit summary and questions were answered concerning future plans and follow up. Advise pt of any urgent changes then to proceed to the ER.

## 2021-09-13 NOTE — PROGRESS NOTES
HISTORY OF PRESENT ILLNESS  Jose R Pena is a 70 y.o. female. HPI   Follow up on chronic medical problems. Overall feeling well. Doing the precautionary measures at home to reduce risks of exposure COVID19. Also wearing mask when she is going out. No known sick contacts or known exposure to [address]. She has not had the COVID19 vaccine. Discussed vaccine and COVID19 illness. Risk and benefits of getting vaccinated reviewed. Cardiovascular Review:  The patient has hypertension and hyperlipidemia. Diet and Lifestyle: generally follows a low fat low cholesterol diet, generally follows a low sodium diet, exercises sporadically  Home BP Monitoring: is not measured at home. Pertinent ROS: taking medications as instructed, no medication side effects noted, no TIA's, no chest pain on exertion, no dyspnea on exertion, no swelling of ankles. Hypercholesterolemia follow up:  Compliant w/ low fat, low cholesterol diet. Exercising some. Patient fasting today. Thyroid Review:  Patient is seen for followup of hypothyroidism. Thyroid ROS: denies heat/cold intolerance, bowel/skin changes or CVS symptoms. Weight overall stable. Has had some fatigue. Has been exercising regularly and sleeping well. Taking multivitamins. Encounter for pain management. 1./2. Medical history/Past medical History  Chronic Pain:  Has osteoarthritis in multple joints. Pain is worse in the thumb. C/o pain in the right right thumb from arthritits. Pain in the thumb in particular is severe at times. Using the tramadol for severe pain. Taking NSAIDs upsets her stomach. She takes the tramadol less than 1 time a month. 3. Applicable records from prior treatment providers are apart of [facility] under the media tab. 4. Diagnostic, therapeutic and laboratory results are available in the [address] [place] [address] chart. 5. Consultation notes are available for review in the media tab of the [address] [place] [address] chart.   6. Treatment goals include pain control so that the pt may be as active and function with her daily activities and improved comfort level. Previously pt has been limited by pain. 7. The risks and benefits of treatment has been discussed at this office visit with the pt. She understands that the medication has addicting potential.  Additionally the pt has been advised that narcotic pain medication may impair mental and/or physical ability required for performance of tasks such as driving or operating any other machinery. 8. Pt has an updated signed pain contract on file and can be found under the FYI section of the [facility] chart. 9. The pain contract is reviewed. Pain medication will be continued at the previous dosage. Urine drug screening will not be done today. Diagnostic studies are not indicated at this time. Interventional procedure are not indicated at this time. 10. Medication prescibed is traMADol (ULTRAM) 50 mg tablet.  has not been reviewed at this OV by me since no refill was needed. 11. Patient instructions have been reviewed in detail as outlined above and in the pain contract. 12. Re-eval is planned for 3 months. Patient Active Problem List   Diagnosis Code    Menopause Z78.0    Hypothyroidism E03.9    Anxiety F41.9    GERD (gastroesophageal reflux disease) K21.9    Lung nodule R91.1    Degenerative arthritis of thumb M18.10       Current Outpatient Medications   Medication Sig Dispense Refill    ALPRAZolam (XANAX) 1 mg tablet TAKE 1 TABLET BY MOUTH TWICE A DAY AS NEEDED. MAXIMUM 2 TABLETS PER DAY 60 Tablet 1    omeprazole (PRILOSEC) 20 mg capsule TAKE 1 CAPSULE BY MOUTH EVERY DAY 90 Cap 3    amLODIPine (NORVASC) 2.5 mg tablet TAKE 2 TABS BY MOUTH DAILY. FOR HIGH BLOOD PRESSURE. 180 Tab 2    levothyroxine (SYNTHROID) 75 mcg tablet Take 1 Tab by mouth Daily (before breakfast).  90 Tab 3    calcium-cholecalciferol, d3, (CALCIUM 600 + D) 600-125 mg-unit tab Take 1 Tab by mouth daily.      traMADol (ULTRAM) 50 mg tablet Take 50 mg by mouth every six (6) hours as needed for Pain.  vitamin E acetate (VITAMIN E PO) Take 400 mg by mouth daily.  black cohosh 40 mg tab Take 1 Tab by mouth daily.       fluticasone (FLONASE) 50 mcg/actuation nasal spray USE TWO SPRAYS IN EACH NOSTRIL DAILY  (Patient taking differently: daily as needed.) 16 g 10       Allergies   Allergen Reactions    Codeine Nausea and Vomiting    Mepivacaine Other (comments)     Mepivacaine 3%   Facial swelling    Novocain [Procaine] Swelling     Facial swelling         Past Medical History:   Diagnosis Date    Anxiety 4/15/2010    Environmental allergies     GERD (gastroesophageal reflux disease)     Hypothyroidism 4/15/2010    Lung nodule     Menopause 4/15/2010    Osteoporosis     Pneumonia     Thyroid disease          Past Surgical History:   Procedure Laterality Date    COLONOSCOPY N/A 2021    COLONOSCOPY performed by Mark Celestin MD at [facility] ENDOSCOPY    COLONOSCOPY,DIAGNOSTIC  2021         HX APPENDECTOMY      HX HYSTERECTOMY      DE COLONOSCOPY FLX DX W/COLLJ [address] WHEN PFRMD  2010    [facility]         Family History   Problem Relation Age of Onset    Lung Disease Mother         emphysema    Hypertension Mother     Cancer Father         colon     Alzheimer Brother        Social History     Tobacco Use    Smoking status: Former Smoker     Packs/day: 0.50     Years: 30.00     Pack years: 15.00     Quit date: 2000     Years since quittin.7    Smokeless tobacco: Never Used   Substance Use Topics    Alcohol use: Not Currently     Alcohol/week: 0.0 standard drinks        Lab Results   Component Value Date/Time    WBC 7.5 2021 10:02 AM    HGB 14.8 2021 10:02 AM    HCT 44.5 2021 10:02 AM    PLATELET 816  10:02 AM    MCV 89 2021 10:02 AM     Lab Results   Component Value Date/Time    Cholesterol, total 219 (H) 2021 10:02 AM    HDL Cholesterol 85 03/12/2021 10:02 AM    LDL, calculated 125 (H) 03/12/2021 10:02 AM    LDL, calculated 110 (H) 02/28/2020 02:40 PM    Triglyceride 52 03/12/2021 10:02 AM    CHOL/HDL Ratio 2.3 02/17/2009 04:08 PM     Lab Results   Component Value Date/Time    TSH 2.630 03/12/2021 10:02 AM    T4, Free 1.56 12/11/2015 08:13 AM      Lab Results   Component Value Date/Time    Sodium 142 03/12/2021 10:02 AM    Potassium 5.1 03/12/2021 10:02 AM    Chloride 103 03/12/2021 10:02 AM    CO2 23 03/12/2021 10:02 AM    Anion gap 6 10/07/2012 11:30 AM    Glucose 93 03/12/2021 10:02 AM    BUN 18 03/12/2021 10:02 AM    Creatinine 0.79 03/12/2021 10:02 AM    BUN/Creatinine ratio 23 03/12/2021 10:02 AM    GFR est AA 87 03/12/2021 10:02 AM    GFR est non-AA 76 03/12/2021 10:02 AM    Calcium 10.3 03/12/2021 10:02 AM    Bilirubin, total 0.5 03/12/2021 10:02 AM    ALT (SGPT) 13 03/12/2021 10:02 AM    Alk. phosphatase 82 03/12/2021 10:02 AM    Protein, total 7.3 03/12/2021 10:02 AM    Albumin 5.0 (H) 03/12/2021 10:02 AM    Globulin 3.7 10/07/2012 11:30 AM    A-G Ratio 2.2 03/12/2021 10:02 AM      Lab Results   Component Value Date/Time    Hemoglobin A1c 5.3 04/20/2012 10:34 AM    Hemoglobin A1c (POC) 4.9 05/14/2018 10:15 AM         Review of Systems   Constitutional: Negative for malaise/fatigue. HENT: Negative for congestion. Eyes: Negative for blurred vision. Respiratory: Negative for cough and shortness of breath. Cardiovascular: Negative for chest pain, palpitations and leg swelling. Gastrointestinal: Negative for abdominal pain, constipation and heartburn. Genitourinary: Negative for dysuria, frequency and urgency. Neurological: Negative for dizziness, tingling and headaches. Endo/Heme/Allergies: Negative for environmental allergies. Psychiatric/Behavioral: Negative for depression. The patient does not have insomnia. Physical Exam  Vitals and nursing note reviewed.    Constitutional:       Appearance: Normal appearance. She is well-developed. Comments: /82 (BP 1 Location: Left upper arm, BP Patient Position: Sitting, BP Cuff Size: Adult)   Pulse 76   Temp 98 °F (36.7 °C)   Resp 20   Ht 5' 1\" (1.549 m)   Wt 125 lb 12.8 oz (57.1 kg)   LMP 01/01/1983   SpO2 94%   BMI 23.77 kg/m²      HENT:      Right Ear: Tympanic membrane and ear canal normal.      Left Ear: Tympanic membrane and ear canal normal.      Nose: No mucosal edema. Neck:      Thyroid: No thyromegaly. Vascular: No carotid bruit. Cardiovascular:      Rate and Rhythm: Normal rate and regular rhythm. Pulses: Normal pulses. Heart sounds: Normal heart sounds. No gallop. Pulmonary:      Effort: Pulmonary effort is normal.      Breath sounds: Normal breath sounds. Chest:      Breasts:         Right: Normal.         Left: Normal.   Abdominal:      General: Bowel sounds are normal.      Palpations: Abdomen is soft. There is no mass. Tenderness: There is no abdominal tenderness. Musculoskeletal:         General: Normal range of motion. Cervical back: Normal range of motion and neck supple. Right lower leg: No edema. Left lower leg: No edema. Lymphadenopathy:      Cervical: No cervical adenopathy. Upper Body:      Right upper body: No supraclavicular, axillary or pectoral adenopathy. Left upper body: No supraclavicular, axillary or pectoral adenopathy. Skin:     General: Skin is warm and dry. Neurological:      General: No focal deficit present. Mental Status: She is alert and oriented to person, place, and time. Psychiatric:         Mood and Affect: Mood normal.         ASSESSMENT and PLAN  Diagnoses and all orders for this visit:    1. Medicare annual wellness visit, subsequent    2.  Essential hypertension  Discussed sodium restriction, high k rich diet, maintaining ideal body weight and regular exercise program such as daily walking 30 min perday 4-5 times per week, as physiologic means to achieve blood pressure control. Medication compliance advised. 3. Hypercholesterolemia  Continue to monitor. Work on diet and exercise.  -     LIPID PANEL; Future    4. Hypothyroidism due to acquired atrophy of thyroid  -     TSH 3RD GENERATION; Future    5. Arthralgia of multiple joints//  6. Encounter for chronic pain management  Overall stable     7. Encounter for medication monitoring  -     METABOLIC PANEL, COMPREHENSIVE; Future  -     URINALYSIS W/ REFLEX CULTURE; Future    8. COVID-19 vaccination declined  After discussion the pt thinks she will go on next week to get her COVID19 vaccine. She will check with her pharmacist.    Follow-up and Dispositions    · Return in about 6 months (around 3/13/2022). reviewed diet, exercise and weight control  cardiovascular risk and specific lipid/LDL goals reviewed  reviewed medications and side effects in detail    I have discussed diagnosis listed in this note with pt and/or family. I have discussed treatment plans and options and the risk/benefit analysis of those options, including safe use of medications and possible medication side effects. Through the use of shared decision making we have agreed to the above plan. The patient has received an after-visit summary and questions were answered concerning future plans and follow up. Advise pt of any urgent changes then to proceed to the ER.

## 2021-09-14 LAB
ALBUMIN SERPL-MCNC: 4.3 G/DL (ref 3.5–5)
ALBUMIN/GLOB SERPL: 1.5 {RATIO} (ref 1.1–2.2)
ALP SERPL-CCNC: 79 U/L (ref 45–117)
ALT SERPL-CCNC: 17 U/L (ref 12–78)
ANION GAP SERPL CALC-SCNC: 6 MMOL/L (ref 5–15)
APPEARANCE UR: CLEAR
AST SERPL-CCNC: 9 U/L (ref 15–37)
BACTERIA URNS QL MICRO: ABNORMAL /HPF
BILIRUB SERPL-MCNC: 0.6 MG/DL (ref 0.2–1)
BILIRUB UR QL: NEGATIVE
BUN SERPL-MCNC: 16 MG/DL (ref 6–20)
BUN/CREAT SERPL: 18 (ref 12–20)
CALCIUM SERPL-MCNC: 9.6 MG/DL (ref 8.5–10.1)
CHLORIDE SERPL-SCNC: 107 MMOL/L (ref 97–108)
CHOLEST SERPL-MCNC: 201 MG/DL
CO2 SERPL-SCNC: 28 MMOL/L (ref 21–32)
COLOR UR: ABNORMAL
CREAT SERPL-MCNC: 0.89 MG/DL (ref 0.55–1.02)
EPITH CASTS URNS QL MICRO: ABNORMAL /LPF
GLOBULIN SER CALC-MCNC: 2.8 G/DL (ref 2–4)
GLUCOSE SERPL-MCNC: 80 MG/DL (ref 65–100)
GLUCOSE UR STRIP.AUTO-MCNC: NEGATIVE MG/DL
HDLC SERPL-MCNC: 70 MG/DL
HDLC SERPL: 2.9 {RATIO} (ref 0–5)
HGB UR QL STRIP: NEGATIVE
HYALINE CASTS URNS QL MICRO: ABNORMAL /LPF (ref 0–5)
KETONES UR QL STRIP.AUTO: NEGATIVE MG/DL
LDLC SERPL CALC-MCNC: 114.4 MG/DL (ref 0–100)
LEUKOCYTE ESTERASE UR QL STRIP.AUTO: NEGATIVE
NITRITE UR QL STRIP.AUTO: NEGATIVE
PH UR STRIP: 6 [PH] (ref 5–8)
POTASSIUM SERPL-SCNC: 4.9 MMOL/L (ref 3.5–5.1)
PROT SERPL-MCNC: 7.1 G/DL (ref 6.4–8.2)
PROT UR STRIP-MCNC: NEGATIVE MG/DL
RBC #/AREA URNS HPF: ABNORMAL /HPF (ref 0–5)
SODIUM SERPL-SCNC: 141 MMOL/L (ref 136–145)
SP GR UR REFRACTOMETRY: 1.01 (ref 1–1.03)
TRIGL SERPL-MCNC: 83 MG/DL (ref ?–150)
TSH SERPL DL<=0.005 MIU/L-ACNC: 3.58 UIU/ML (ref 0.45–4.5)
UA: UC IF INDICATED,UAUC: ABNORMAL
UROBILINOGEN UR QL STRIP.AUTO: 0.2 EU/DL (ref 0.2–1)
VLDLC SERPL CALC-MCNC: 16.6 MG/DL
WBC URNS QL MICRO: ABNORMAL /HPF (ref 0–4)

## 2021-09-19 DIAGNOSIS — E03.4 HYPOTHYROIDISM DUE TO ACQUIRED ATROPHY OF THYROID: ICD-10-CM

## 2021-09-20 RX ORDER — LEVOTHYROXINE SODIUM 75 UG/1
TABLET ORAL
Qty: 90 TABLET | Refills: 3 | Status: SHIPPED | OUTPATIENT
Start: 2021-09-20 | End: 2022-03-14 | Stop reason: SDUPTHER

## 2021-09-28 DIAGNOSIS — F41.9 ANXIETY: ICD-10-CM

## 2021-09-28 RX ORDER — ALPRAZOLAM 1 MG/1
TABLET ORAL
Qty: 60 TABLET | Refills: 1 | Status: SHIPPED | OUTPATIENT
Start: 2021-09-28 | End: 2021-11-29

## 2021-10-15 DIAGNOSIS — I10 ESSENTIAL HYPERTENSION: ICD-10-CM

## 2021-10-18 RX ORDER — AMLODIPINE BESYLATE 2.5 MG/1
5 TABLET ORAL DAILY
Qty: 180 TABLET | Refills: 2 | Status: SHIPPED | OUTPATIENT
Start: 2021-10-18 | End: 2022-07-07

## 2021-11-27 DIAGNOSIS — F41.9 ANXIETY: ICD-10-CM

## 2021-11-29 RX ORDER — ALPRAZOLAM 1 MG/1
TABLET ORAL
Qty: 60 TABLET | Refills: 1 | Status: SHIPPED | OUTPATIENT
Start: 2021-11-29 | End: 2022-01-28

## 2021-12-14 RX ORDER — MUPIROCIN 20 MG/G
OINTMENT TOPICAL 2 TIMES DAILY
Qty: 22 G | Refills: 1 | Status: SHIPPED | OUTPATIENT
Start: 2021-12-14

## 2021-12-14 NOTE — TELEPHONE ENCOUNTER
Last visit:9/13/21  Next visit: 3/14/22  Previous refill 9/13/21 (no quantity or refills listed)    Requested Prescriptions     Pending Prescriptions Disp Refills    mupirocin (BACTROBAN) 2 % ointment 22 g 1     Sig: Apply  to affected area two (2) times a day.

## 2021-12-24 DIAGNOSIS — K21.9 GASTROESOPHAGEAL REFLUX DISEASE: ICD-10-CM

## 2021-12-27 RX ORDER — OMEPRAZOLE 20 MG/1
CAPSULE, DELAYED RELEASE ORAL
Qty: 90 CAPSULE | Refills: 3 | Status: SHIPPED | OUTPATIENT
Start: 2021-12-27

## 2021-12-30 NOTE — TELEPHONE ENCOUNTER
Last Visit: 9/5  Next Appt: 3/5  Previous Refill Encounter: 10/5-30+0    Requested Prescriptions     Pending Prescriptions Disp Refills    traMADol (ULTRAM) 50 mg tablet 30 Tab 0     Sig: Take 1 tablet by mouth every 6 hours as needed for pain.  (Max 4 tablets per day) Albendazole Counseling:  I discussed with the patient the risks of albendazole including but not limited to cytopenia, kidney damage, nausea/vomiting and severe allergy.  The patient understands that this medication is being used in an off-label manner.

## 2022-01-28 DIAGNOSIS — F41.9 ANXIETY: ICD-10-CM

## 2022-01-28 RX ORDER — ALPRAZOLAM 1 MG/1
TABLET ORAL
Qty: 60 TABLET | Refills: 1 | Status: SHIPPED | OUTPATIENT
Start: 2022-01-28 | End: 2022-03-28

## 2022-03-14 ENCOUNTER — OFFICE VISIT (OUTPATIENT)
Dept: FAMILY MEDICINE CLINIC | Age: 72
End: 2022-03-14
Payer: MEDICARE

## 2022-03-14 VITALS
HEIGHT: 61 IN | DIASTOLIC BLOOD PRESSURE: 84 MMHG | RESPIRATION RATE: 16 BRPM | HEART RATE: 78 BPM | OXYGEN SATURATION: 97 % | WEIGHT: 139 LBS | BODY MASS INDEX: 26.24 KG/M2 | TEMPERATURE: 97.1 F | SYSTOLIC BLOOD PRESSURE: 136 MMHG

## 2022-03-14 DIAGNOSIS — Z51.81 ENCOUNTER FOR MEDICATION MONITORING: ICD-10-CM

## 2022-03-14 DIAGNOSIS — E78.00 HYPERCHOLESTEROLEMIA: ICD-10-CM

## 2022-03-14 DIAGNOSIS — I10 ESSENTIAL HYPERTENSION: Primary | ICD-10-CM

## 2022-03-14 DIAGNOSIS — M25.50 ARTHRALGIA OF MULTIPLE JOINTS: ICD-10-CM

## 2022-03-14 DIAGNOSIS — E03.4 HYPOTHYROIDISM DUE TO ACQUIRED ATROPHY OF THYROID: ICD-10-CM

## 2022-03-14 DIAGNOSIS — Z28.21 COVID-19 VACCINATION DECLINED: ICD-10-CM

## 2022-03-14 DIAGNOSIS — G89.29 ENCOUNTER FOR CHRONIC PAIN MANAGEMENT: ICD-10-CM

## 2022-03-14 DIAGNOSIS — M19.049 HAND ARTHROPATHY: ICD-10-CM

## 2022-03-14 LAB
ALBUMIN SERPL-MCNC: 4.1 G/DL (ref 3.5–5)
ALBUMIN/GLOB SERPL: 1.5 {RATIO} (ref 1.1–2.2)
ALP SERPL-CCNC: 82 U/L (ref 45–117)
ALT SERPL-CCNC: 20 U/L (ref 12–78)
ANION GAP SERPL CALC-SCNC: 6 MMOL/L (ref 5–15)
APPEARANCE UR: CLEAR
AST SERPL-CCNC: 14 U/L (ref 15–37)
BACTERIA URNS QL MICRO: NEGATIVE /HPF
BILIRUB SERPL-MCNC: 0.6 MG/DL (ref 0.2–1)
BILIRUB UR QL: NEGATIVE
BUN SERPL-MCNC: 16 MG/DL (ref 6–20)
BUN/CREAT SERPL: 23 (ref 12–20)
CALCIUM SERPL-MCNC: 9.9 MG/DL (ref 8.5–10.1)
CHLORIDE SERPL-SCNC: 106 MMOL/L (ref 97–108)
CHOLEST SERPL-MCNC: 181 MG/DL
CO2 SERPL-SCNC: 27 MMOL/L (ref 21–32)
COLOR UR: ABNORMAL
CREAT SERPL-MCNC: 0.71 MG/DL (ref 0.55–1.02)
EPITH CASTS URNS QL MICRO: ABNORMAL /LPF
ERYTHROCYTE [DISTWIDTH] IN BLOOD BY AUTOMATED COUNT: 12.4 % (ref 11.5–14.5)
GLOBULIN SER CALC-MCNC: 2.8 G/DL (ref 2–4)
GLUCOSE SERPL-MCNC: 90 MG/DL (ref 65–100)
GLUCOSE UR STRIP.AUTO-MCNC: NEGATIVE MG/DL
HCT VFR BLD AUTO: 42.2 % (ref 35–47)
HDLC SERPL-MCNC: 77 MG/DL
HDLC SERPL: 2.4 {RATIO} (ref 0–5)
HGB BLD-MCNC: 13.7 G/DL (ref 11.5–16)
HGB UR QL STRIP: ABNORMAL
HYALINE CASTS URNS QL MICRO: ABNORMAL /LPF (ref 0–5)
KETONES UR QL STRIP.AUTO: NEGATIVE MG/DL
LDLC SERPL CALC-MCNC: 90.8 MG/DL (ref 0–100)
LEUKOCYTE ESTERASE UR QL STRIP.AUTO: ABNORMAL
MCH RBC QN AUTO: 29.6 PG (ref 26–34)
MCHC RBC AUTO-ENTMCNC: 32.5 G/DL (ref 30–36.5)
MCV RBC AUTO: 91.1 FL (ref 80–99)
NITRITE UR QL STRIP.AUTO: NEGATIVE
NRBC # BLD: 0 K/UL (ref 0–0.01)
NRBC BLD-RTO: 0 PER 100 WBC
PH UR STRIP: 5.5 [PH] (ref 5–8)
PLATELET # BLD AUTO: 284 K/UL (ref 150–400)
PMV BLD AUTO: 11 FL (ref 8.9–12.9)
POTASSIUM SERPL-SCNC: 4.9 MMOL/L (ref 3.5–5.1)
PROT SERPL-MCNC: 6.9 G/DL (ref 6.4–8.2)
PROT UR STRIP-MCNC: NEGATIVE MG/DL
RBC # BLD AUTO: 4.63 M/UL (ref 3.8–5.2)
RBC #/AREA URNS HPF: ABNORMAL /HPF (ref 0–5)
SODIUM SERPL-SCNC: 139 MMOL/L (ref 136–145)
SP GR UR REFRACTOMETRY: 1.01 (ref 1–1.03)
TRIGL SERPL-MCNC: 66 MG/DL (ref ?–150)
UA: UC IF INDICATED,UAUC: ABNORMAL
UROBILINOGEN UR QL STRIP.AUTO: 0.2 EU/DL (ref 0.2–1)
VLDLC SERPL CALC-MCNC: 13.2 MG/DL
WBC # BLD AUTO: 5.6 K/UL (ref 3.6–11)
WBC URNS QL MICRO: ABNORMAL /HPF (ref 0–4)

## 2022-03-14 PROCEDURE — G8752 SYS BP LESS 140: HCPCS | Performed by: FAMILY MEDICINE

## 2022-03-14 PROCEDURE — G8536 NO DOC ELDER MAL SCRN: HCPCS | Performed by: FAMILY MEDICINE

## 2022-03-14 PROCEDURE — G8754 DIAS BP LESS 90: HCPCS | Performed by: FAMILY MEDICINE

## 2022-03-14 PROCEDURE — 99214 OFFICE O/P EST MOD 30 MIN: CPT | Performed by: FAMILY MEDICINE

## 2022-03-14 PROCEDURE — G8399 PT W/DXA RESULTS DOCUMENT: HCPCS | Performed by: FAMILY MEDICINE

## 2022-03-14 PROCEDURE — 1101F PT FALLS ASSESS-DOCD LE1/YR: CPT | Performed by: FAMILY MEDICINE

## 2022-03-14 PROCEDURE — G8427 DOCREV CUR MEDS BY ELIG CLIN: HCPCS | Performed by: FAMILY MEDICINE

## 2022-03-14 PROCEDURE — 3017F COLORECTAL CA SCREEN DOC REV: CPT | Performed by: FAMILY MEDICINE

## 2022-03-14 PROCEDURE — 1090F PRES/ABSN URINE INCON ASSESS: CPT | Performed by: FAMILY MEDICINE

## 2022-03-14 PROCEDURE — G0463 HOSPITAL OUTPT CLINIC VISIT: HCPCS | Performed by: FAMILY MEDICINE

## 2022-03-14 PROCEDURE — G8510 SCR DEP NEG, NO PLAN REQD: HCPCS | Performed by: FAMILY MEDICINE

## 2022-03-14 PROCEDURE — G9899 SCRN MAM PERF RSLTS DOC: HCPCS | Performed by: FAMILY MEDICINE

## 2022-03-14 PROCEDURE — G8419 CALC BMI OUT NRM PARAM NOF/U: HCPCS | Performed by: FAMILY MEDICINE

## 2022-03-14 RX ORDER — FLUTICASONE PROPIONATE 50 MCG
2 SPRAY, SUSPENSION (ML) NASAL
COMMUNITY

## 2022-03-14 RX ORDER — IBUPROFEN 800 MG/1
800 TABLET ORAL
Qty: 30 TABLET | Refills: 3 | Status: SHIPPED | OUTPATIENT
Start: 2022-03-14

## 2022-03-14 RX ORDER — DICLOFENAC SODIUM 10 MG/G
GEL TOPICAL
Qty: 100 G | Refills: 5 | Status: SHIPPED | OUTPATIENT
Start: 2022-03-14 | End: 2022-08-09

## 2022-03-14 NOTE — PATIENT INSTRUCTIONS
Hand Arthritis: Exercises  Introduction  Here are some examples of exercises for you to try. The exercises may be suggested for a condition or for rehabilitation. Start each exercise slowly. Ease off the exercises if you start to have pain. You will be told when to start these exercises and which ones will work best for you. How to do the exercises  Tendon glides    1. In this exercise, the steps follow one another to a make a continuous movement. 2. With your affected hand, point your fingers and thumb straight up. Your wrist should be relaxed, following the line of your fingers and thumb. 3. Curl your fingers so that the top two joints in them are bent, and your fingers wrap down. Your fingertips should touch or be near the base of your fingers. Your fingers will look like a hook. 4. Make a fist by bending your knuckles. Your thumb can gently rest against your index (pointing) finger. 5. Unwind your fingers slightly so that your fingertips can touch the base of your palm. Your thumb can rest against your index finger. 6. Move back to your starting position, with your fingers and thumb pointing up. 7. Repeat the series of motions 8 to 12 times. 8. Switch hands and repeat steps 1 through 6, even if only one hand is sore. Intrinsic flexion    1. Rest your affected hand on a table and bend the large joints where your fingers connect to your hand. Keep your thumb and the other joints in your fingers straight. 2. Slowly straighten your fingers. Your wrist should be relaxed, following the line of your fingers and thumb. 3. Move back to your starting position, with your hand bent. 4. Repeat 8 to 12 times. 5. Switch hands and repeat steps 1 through 4, even if only one hand is sore. Finger extension    1. Place your affected hand flat on a table. 2. Lift and then lower one finger at a time off the table. 3. Repeat 8 to 12 times.   4. Switch hands and repeat steps 1 through 3, even if only one hand is sore.  MP extension    1. Place your good hand on a table, palm up. Put your affected hand on top of your good hand with your fingers wrapped around the thumb of your good hand like you are making a fist.  2. Slowly uncurl the joints of your affected hand where your fingers connect to your hand so that only the top two joints of your fingers are bent. Your fingers will look like a hook. 3. Move back to your starting position, with your fingers wrapped around your good thumb. 4. Repeat 8 to 12 times. 5. Switch hands and repeat steps 1 through 4, even if only one hand is sore. PIP extension (with MP extension)    1. Place your good hand on a table, palm up. Put your affected hand on top of your good hand, palm up. 2. Use the thumb and fingers of your good hand to grasp below the middle joint of one finger of your affected hand. 3. Straighten the last two joints of that finger. 4. Repeat 8 to 12 times. 5. Repeat steps 1 through 4 with each finger. 6. Switch hands and repeat steps 1 through 5, even if only one hand is sore. DIP flexion    1. With your good hand, grasp one finger of your affected hand. Your thumb will be on the top side of your finger just below the joint that is closest to your fingernail. 2. Slowly bend your affected finger only at the joint closest to your fingernail. 3. Repeat 8 to 12 times. 4. Repeat steps 1 through 3 with each finger. 5. Switch hands and repeat steps 1 through 4, even if only one hand is sore. Follow-up care is a key part of your treatment and safety. Be sure to make and go to all appointments, and call your doctor if you are having problems. It's also a good idea to know your test results and keep a list of the medicines you take. Where can you learn more? Go to http://www.gray.com/  Enter E040 in the search box to learn more about \"Hand Arthritis: Exercises. \"  Current as of: July 1, 2021               Content Version: 13.2  © 5599-5334 [employer]. Care instructions adapted under license by [employer] (which disclaims liability or warranty for this information). If you have questions about a medical condition or this instruction, always ask your healthcare professional. [address] any warranty or liability for your use of this information.

## 2022-03-14 NOTE — PROGRESS NOTES
Chief Complaint   Patient presents with    Thyroid Problem     follow up    Cholesterol Problem     follow up    Wrist Pain     pt c/o right wrist pain for about 2 months           Mammogram: 8/31/2021    Bone density 7/24/2020    Colonoscopy 7/1/2021 by Dr. Arun Porras in 5 yrs. 1. \"Have you been to the ER, urgent care clinic since your last visit? Hospitalized since your last visit? \" No    2. \"Have you seen or consulted any other health care providers outside of the [address] [place] [address] since your last visit? \" no     3. For patients aged 39-70: Has the patient had a colonoscopy / FIT/ Cologuard? Yes - no Care Gap present      If the patient is female:    4. For patients aged 41-77: Has the patient had a mammogram within the past 2 years? Yes - no Care Gap present      5. For patients aged 21-65: Has the patient had a pap smear?  NA - based on age or sex

## 2022-03-14 NOTE — PROGRESS NOTES
HISTORY OF PRESENT ILLNESS  Amy Philippe is a 67 y.o. female. HPI   Follow up on chronic medical problems. Overall feeling well. Doing the precautionary measures at home to reduce risks of exposure COVID19. Also wearing mask when she is going out. No known sick contacts or known exposure to [place]. She has not had the COVID19 vaccine. Discussed vaccine and COVID19 illness. Risk and benefits of getting vaccinated reviewed. Cardiovascular Review:  The patient has hypertension and hyperlipidemia. Diet and Lifestyle: generally follows a low fat low cholesterol diet, generally follows a low sodium diet, exercises sporadically  Home BP Monitoring: is not measured at home. Pertinent ROS: taking medications as instructed, no medication side effects noted, no TIA's, no chest pain on exertion, no dyspnea on exertion, no swelling of ankles. Hypercholesterolemia follow up:  Compliant w/ low fat, low cholesterol diet. Exercising some. Patient fasting today. Thyroid Review:  Patient is seen for followup of hypothyroidism. Thyroid ROS: denies heat/cold intolerance, bowel/skin changes or CVS symptoms. Weight is up by 14 pounds in the past 6 months. We discussed her diet and increase with exercise. Has been trying to exercise regularly and sleeping well. Taking multivitamins. Encounter for pain management. 1./2. Medical history/Past medical History  Chronic Pain:  Has osteoarthritis in multple joints. Pain is worse in the thumb and now having pain in the right hand and wrist.  Has slight swelling. Pain in the thumb in particular is severe at times. Using the tramadol for severe pain. Taking NSAIDs upsets her stomach but has been trying to take motrin as needed. She takes the tramadol less than 1 time a month. 3. Applicable records from prior treatment providers are apart of [facility] under the media tab.   4. Diagnostic, therapeutic and laboratory results are available in the [facility] chart. 5. Consultation notes are available for review in the media tab of the [address] [facility] chart. 6. Treatment goals include pain control so that the pt may be as active and function with her daily activities and improved comfort level. Previously pt has been limited by pain. 7. The risks and benefits of treatment has been discussed at this office visit with the pt. She understands that the medication has addicting potential.  Additionally the pt has been advised that narcotic pain medication may impair mental and/or physical ability required for performance of tasks such as driving or operating any other machinery. 8. Pt has an updated signed pain contract on file and can be found under the FYI section of the [facility] chart. 9. The pain contract is reviewed. Pain medication will be continued at the previous dosage. Urine drug screening will not be done today. Diagnostic studies are not indicated at this time. Interventional procedure are not indicated at this time. 10. Medication prescibed is traMADol (ULTRAM) 50 mg tablet.  has not been reviewed at this OV by me since no refill was needed. 11. Patient instructions have been reviewed in detail as outlined above and in the pain contract. 12. Re-eval is planned for 3 months. HM:  Mammogram: 8/31/2021   Bone density 7/24/2020   Colonoscopy 7/1/2021 by Dr. Thai Lopez in 5 yrs. Patient Active Problem List   Diagnosis Code    Menopause Z78.0    Hypothyroidism E03.9    Anxiety F41.9    GERD (gastroesophageal reflux disease) K21.9    Lung nodule R91.1    Degenerative arthritis of thumb M18.10       Current Outpatient Medications   Medication Sig Dispense Refill    ALPRAZolam (XANAX) 1 mg tablet TAKE 1 TABLET BY MOUTH TWICE A DAY AS NEEDED.  MAXIMUM 2 TABLETS PER DAY 60 Tablet 1    omeprazole (PRILOSEC) 20 mg capsule TAKE 1 CAPSULE BY MOUTH EVERY DAY 90 Capsule 3    mupirocin (BACTROBAN) 2 % ointment Apply  to affected area two (2) times a day. 22 g 1    amLODIPine (NORVASC) 2.5 mg tablet TAKE 2 TABS BY MOUTH DAILY. FOR HIGH BLOOD PRESSURE. 180 Tablet 2    levothyroxine (SYNTHROID) 75 mcg tablet TAKE 1 TABLET BY MOUTH EVERY DAY BEFORE BREAKFAST 90 Tablet 3    dextromethorphan-guaiFENesin (Mucinex DM) 60-1,200 mg Tb12 Take 1 Tablet by mouth.  loratadine-pseudoephedrine (Claritin-D 24 Hour)  mg per tablet Take 1 Tablet by mouth daily.  calcium-cholecalciferol, d3, (CALCIUM 600 + D) 600-125 mg-unit tab Take 1 Tab by mouth daily.  traMADol (ULTRAM) 50 mg tablet Take 50 mg by mouth every six (6) hours as needed for Pain.  vitamin E acetate (VITAMIN E PO) Take 400 mg by mouth daily.  black cohosh 40 mg tab Take 1 Tab by mouth daily.       fluticasone (FLONASE) 50 mcg/actuation nasal spray USE TWO SPRAYS IN EACH NOSTRIL DAILY  (Patient taking differently: daily as needed.) 16 g 10       Allergies   Allergen Reactions    Codeine Nausea and Vomiting    Mepivacaine Other (comments)     Mepivacaine 3%   Facial swelling    Novocain [Procaine] Swelling     Facial swelling       Past Medical History:   Diagnosis Date    Anxiety 4/15/2010    Environmental allergies     GERD (gastroesophageal reflux disease)     Hypothyroidism 4/15/2010    Lung nodule     Menopause 4/15/2010    Osteoporosis     Pneumonia     Thyroid disease        Past Surgical History:   Procedure Laterality Date    COLONOSCOPY N/A 7/1/2021    COL[facility]  7/1/2021         HX APPENDECTOMY      HX HYSTERECTOMY  1983    OR COLONOSCOPY FLX DX W/COLLJ [address] [facility]  8/4/2010    [facility]       Family History   Problem Relation Age of Onset    Lung Disease Mother         emphysema    Hypertension Mother     Cancer Father         colon     Alzheimer's Disease Brother        Social History     Tobacco Use    Smoking status: Former Smoker Packs/day: 0.50     Years: 30.00     Pack years: 15.00     Quit date: 2000     Years since quittin.2    Smokeless tobacco: Never Used   Substance Use Topics    Alcohol use: Not Currently     Alcohol/week: 0.0 standard drinks        Lab Results   Component Value Date/Time    WBC 7.5 2021 10:02 AM    HGB 14.8 2021 10:02 AM    HCT 44.5 2021 10:02 AM    PLATELET 120  10:02 AM    MCV 89 2021 10:02 AM     Lab Results   Component Value Date/Time    Cholesterol, total 201 (H) 2021 08:43 AM    HDL Cholesterol 70 2021 08:43 AM    LDL, calculated 114.4 (H) 2021 08:43 AM    Triglyceride 83 2021 08:43 AM    CHOL/HDL Ratio 2.9 2021 08:43 AM     Lab Results   Component Value Date/Time    TSH 3.580 2021 08:43 AM    T4, Free 1.56 2015 08:13 AM      Lab Results   Component Value Date/Time    Sodium 141 2021 08:43 AM    Potassium 4.9 2021 08:43 AM    Chloride 107 2021 08:43 AM    CO2 28 2021 08:43 AM    Anion gap 6 2021 08:43 AM    Glucose 80 2021 08:43 AM    BUN 16 2021 08:43 AM    Creatinine 0.89 2021 08:43 AM    BUN/Creatinine ratio 18 2021 08:43 AM    GFR est AA >60 2021 08:43 AM    GFR est non-AA >60 2021 08:43 AM    Calcium 9.6 2021 08:43 AM    Bilirubin, total 0.6 2021 08:43 AM    ALT (SGPT) 17 2021 08:43 AM    Alk. phosphatase 79 2021 08:43 AM    Protein, total 7.1 2021 08:43 AM    Albumin 4.3 2021 08:43 AM    Globulin 2.8 2021 08:43 AM    A-G Ratio 1.5 2021 08:43 AM      Lab Results   Component Value Date/Time    Hemoglobin A1c 5.3 2012 10:34 AM    Hemoglobin A1c (POC) 4.9 2018 10:15 AM         Review of Systems   Constitutional: Negative for malaise/fatigue. HENT: Negative for congestion. Eyes: Negative for blurred vision. Respiratory: Negative for cough and shortness of breath.     Cardiovascular: Negative for chest pain, palpitations and leg swelling. Gastrointestinal: Negative for abdominal pain, constipation and heartburn. Genitourinary: Negative for dysuria, frequency and urgency. Musculoskeletal: Negative for back pain and joint pain. Neurological: Negative for dizziness, tingling and headaches. Endo/Heme/Allergies: Negative for environmental allergies. Psychiatric/Behavioral: Negative for depression. The patient does not have insomnia. Physical Exam  Vitals and nursing note reviewed. Constitutional:       Appearance: Normal appearance. She is well-developed. Comments: /84 (BP 1 Location: Left arm, BP Patient Position: Sitting)   Pulse 78   Temp 97.1 °F (36.2 °C) (Oral)   Resp 16   Ht 5' 1\" (1.549 m)   Wt 139 lb (63 kg)   LMP 01/01/1983   SpO2 97%   BMI 26.26 kg/m²    HENT:      Right Ear: Tympanic membrane and ear canal normal.      Left Ear: Tympanic membrane and ear canal normal.   Neck:      Thyroid: No thyromegaly. Cardiovascular:      Rate and Rhythm: Normal rate and regular rhythm. Heart sounds: Normal heart sounds. Pulmonary:      Effort: Pulmonary effort is normal.      Breath sounds: Normal breath sounds. Abdominal:      General: Bowel sounds are normal.      Palpations: Abdomen is soft. There is no mass. Tenderness: There is no abdominal tenderness. Musculoskeletal:         General: Normal range of motion. Right hand: Tenderness and bony tenderness (pain in the thumb and pain with ROM in the right wrist. ) present. No swelling. Normal range of motion. Normal strength. Normal sensation. There is no disruption of two-point discrimination. Normal capillary refill. Cervical back: Normal range of motion and neck supple. Right lower leg: No edema. Left lower leg: No edema. Lymphadenopathy:      Cervical: No cervical adenopathy. Skin:     General: Skin is warm and dry. Neurological:      General: No focal deficit present. Mental Status: She is alert and oriented to person, place, and time. Psychiatric:         Mood and Affect: Mood normal.         ASSESSMENT and PLAN  Diagnoses and all orders for this visit:    1. Essential hypertension  Discussed sodium restriction, high k rich diet, maintaining ideal body weight and regular exercise program such as daily walking 30 min perday 4-5 times per week, as physiologic means to achieve blood pressure control. Medication compliance advised. 2. Hypothyroidism due to acquired atrophy of thyroid  -     TSH 3RD GENERATION; Future    3. Hypercholesterolemia  Continue to monitor. Work on diet and exercise.  -     LIPID PANEL; Future    4. Arthralgia of multiple joints//  5. Hand arthropathy  -     XR HAND RT MIN 3 V; Future  -     ibuprofen (MOTRIN) 800 mg tablet; Take 1 Tablet by mouth every eight (8) hours as needed for Pain.  -     diclofenac (VOLTAREN) 1 % gel; Apply  to affected area four (4) times daily as needed for Pain. Instructions for exercises given and reviewed with pt. Pt also to use heat to the area 3-4 times a day over the next several days until sx are improved. 6. Encounter for chronic pain management  The pt has signed medication agreement. Pain contract is reviewed. Pain medications will be continued at the previous dosage. Urine drug screening will not be done today since she has not been using the tramadol. Diagnostic  studies are not indicated at this time. Interventional procedure are not indicated at this time. Re-eval in 3 months. 7. Encounter for medication monitoring  -     CBC W/O DIFF; Future  -     METABOLIC PANEL, COMPREHENSIVE; Future  -     URINALYSIS W/ REFLEX CULTURE; Future    8. COVID-19 vaccination declined  Discussed vaccine and COVID19 illness. Risk and benefits of getting vaccinated reviewed.           Follow-up and Dispositions    · Return in about 6 months (around 9/14/2022) for medicare wellness exam.       reviewed diet, exercise and weight control  cardiovascular risk and specific lipid/LDL goals reviewed  reviewed medications and side effects in detail    I have discussed diagnosis listed in this note with pt and/or family. I have discussed treatment plans and options and the risk/benefit analysis of those options, including safe use of medications and possible medication side effects. Through the use of shared decision making we have agreed to the above plan. The patient has received an after-visit summary and questions were answered concerning future plans and follow up. Advise pt of any urgent changes then to proceed to the ER.

## 2022-03-25 RX ORDER — LEVOTHYROXINE SODIUM 75 UG/1
75 TABLET ORAL
Qty: 90 TABLET | Refills: 3 | Status: SHIPPED | OUTPATIENT
Start: 2022-03-25

## 2022-03-26 DIAGNOSIS — F41.9 ANXIETY: ICD-10-CM

## 2022-03-28 RX ORDER — ALPRAZOLAM 1 MG/1
TABLET ORAL
Qty: 60 TABLET | Refills: 1 | Status: SHIPPED | OUTPATIENT
Start: 2022-03-28 | End: 2022-05-31

## 2022-05-20 ENCOUNTER — TELEPHONE (OUTPATIENT)
Dept: FAMILY MEDICINE CLINIC | Age: 72
End: 2022-05-20

## 2022-05-20 DIAGNOSIS — F41.9 ANXIETY: ICD-10-CM

## 2022-05-20 RX ORDER — ALPRAZOLAM 1 MG/1
TABLET ORAL
Qty: 60 TABLET | Refills: 1 | OUTPATIENT
Start: 2022-05-20

## 2022-05-28 DIAGNOSIS — F41.9 ANXIETY: ICD-10-CM

## 2022-05-31 RX ORDER — ALPRAZOLAM 1 MG/1
TABLET ORAL
Qty: 60 TABLET | Refills: 1 | Status: SHIPPED | OUTPATIENT
Start: 2022-05-31 | End: 2022-07-27

## 2022-07-26 ENCOUNTER — TELEPHONE (OUTPATIENT)
Dept: FAMILY MEDICINE CLINIC | Age: 72
End: 2022-07-26

## 2022-07-26 DIAGNOSIS — Z78.0 POSTMENOPAUSE: Primary | ICD-10-CM

## 2022-07-26 DIAGNOSIS — Z13.820 ENCOUNTER FOR SCREENING FOR OSTEOPOROSIS: ICD-10-CM

## 2022-07-27 DIAGNOSIS — F41.9 ANXIETY: ICD-10-CM

## 2022-07-27 RX ORDER — ALPRAZOLAM 1 MG/1
TABLET ORAL
Qty: 60 TABLET | Refills: 1 | Status: SHIPPED | OUTPATIENT
Start: 2022-07-27 | End: 2022-09-27

## 2022-07-29 ENCOUNTER — TELEPHONE (OUTPATIENT)
Dept: FAMILY MEDICINE CLINIC | Age: 72
End: 2022-07-29

## 2022-07-29 DIAGNOSIS — Z13.820 ENCOUNTER FOR SCREENING FOR OSTEOPOROSIS: Primary | ICD-10-CM

## 2022-07-29 DIAGNOSIS — Z78.0 POSTMENOPAUSE: ICD-10-CM

## 2022-08-09 DIAGNOSIS — M19.049 HAND ARTHROPATHY: ICD-10-CM

## 2022-08-09 RX ORDER — DICLOFENAC SODIUM 10 MG/G
GEL TOPICAL
Qty: 100 G | Refills: 5 | Status: SHIPPED | OUTPATIENT
Start: 2022-08-09

## 2022-08-19 ENCOUNTER — TRANSCRIBE ORDER (OUTPATIENT)
Dept: MAMMOGRAPHY | Age: 72
End: 2022-08-19

## 2022-08-19 ENCOUNTER — HOSPITAL ENCOUNTER (OUTPATIENT)
Dept: MAMMOGRAPHY | Age: 72
Discharge: HOME | End: 2022-08-19
Attending: FAMILY MEDICINE
Payer: MEDICARE

## 2022-08-19 ENCOUNTER — HOSPITAL ENCOUNTER (OUTPATIENT)
Dept: BONE DENSITY | Age: 72
Discharge: HOME | End: 2022-08-19
Attending: FAMILY MEDICINE
Payer: MEDICARE

## 2022-08-19 DIAGNOSIS — Z78.0 POSTMENOPAUSE: ICD-10-CM

## 2022-08-19 DIAGNOSIS — Z12.31 VISIT FOR SCREENING MAMMOGRAM: ICD-10-CM

## 2022-08-19 DIAGNOSIS — Z12.31 VISIT FOR SCREENING MAMMOGRAM: Primary | ICD-10-CM

## 2022-08-19 DIAGNOSIS — Z13.820 ENCOUNTER FOR SCREENING FOR OSTEOPOROSIS: ICD-10-CM

## 2022-08-19 PROCEDURE — 77067 SCR MAMMO BI INCL CAD: CPT

## 2022-08-19 PROCEDURE — 77080 DXA BONE DENSITY AXIAL: CPT

## 2022-09-14 ENCOUNTER — OFFICE VISIT (OUTPATIENT)
Dept: FAMILY MEDICINE CLINIC | Age: 72
End: 2022-09-14
Payer: MEDICARE

## 2022-09-14 VITALS
BODY MASS INDEX: 25.53 KG/M2 | WEIGHT: 135.2 LBS | RESPIRATION RATE: 15 BRPM | TEMPERATURE: 96.3 F | HEIGHT: 61 IN | DIASTOLIC BLOOD PRESSURE: 84 MMHG | OXYGEN SATURATION: 95 % | SYSTOLIC BLOOD PRESSURE: 128 MMHG | HEART RATE: 77 BPM

## 2022-09-14 DIAGNOSIS — E03.4 HYPOTHYROIDISM DUE TO ACQUIRED ATROPHY OF THYROID: ICD-10-CM

## 2022-09-14 DIAGNOSIS — Z00.00 MEDICARE ANNUAL WELLNESS VISIT, SUBSEQUENT: Primary | ICD-10-CM

## 2022-09-14 DIAGNOSIS — G89.29 ENCOUNTER FOR CHRONIC PAIN MANAGEMENT: ICD-10-CM

## 2022-09-14 DIAGNOSIS — R05.8 ALLERGIC COUGH: ICD-10-CM

## 2022-09-14 DIAGNOSIS — Z23 NEEDS FLU SHOT: ICD-10-CM

## 2022-09-14 DIAGNOSIS — E78.00 HYPERCHOLESTEROLEMIA: ICD-10-CM

## 2022-09-14 DIAGNOSIS — M81.8 OTHER OSTEOPOROSIS WITHOUT CURRENT PATHOLOGICAL FRACTURE: ICD-10-CM

## 2022-09-14 DIAGNOSIS — I10 ESSENTIAL HYPERTENSION: ICD-10-CM

## 2022-09-14 DIAGNOSIS — Z51.81 ENCOUNTER FOR MEDICATION MONITORING: ICD-10-CM

## 2022-09-14 DIAGNOSIS — M25.50 ARTHRALGIA OF MULTIPLE JOINTS: ICD-10-CM

## 2022-09-14 DIAGNOSIS — F41.9 ANXIETY: ICD-10-CM

## 2022-09-14 LAB
25(OH)D3 SERPL-MCNC: 45.3 NG/ML (ref 30–100)
ANION GAP SERPL CALC-SCNC: 3 MMOL/L (ref 5–15)
BUN SERPL-MCNC: 19 MG/DL (ref 6–20)
BUN/CREAT SERPL: 23 (ref 12–20)
CALCIUM SERPL-MCNC: 10.3 MG/DL (ref 8.5–10.1)
CHLORIDE SERPL-SCNC: 107 MMOL/L (ref 97–108)
CO2 SERPL-SCNC: 30 MMOL/L (ref 21–32)
CREAT SERPL-MCNC: 0.83 MG/DL (ref 0.55–1.02)
GLUCOSE SERPL-MCNC: 97 MG/DL (ref 65–100)
POTASSIUM SERPL-SCNC: 4.5 MMOL/L (ref 3.5–5.1)
SODIUM SERPL-SCNC: 140 MMOL/L (ref 136–145)

## 2022-09-14 PROCEDURE — 3017F COLORECTAL CA SCREEN DOC REV: CPT | Performed by: FAMILY MEDICINE

## 2022-09-14 PROCEDURE — G8417 CALC BMI ABV UP PARAM F/U: HCPCS | Performed by: FAMILY MEDICINE

## 2022-09-14 PROCEDURE — 1123F ACP DISCUSS/DSCN MKR DOCD: CPT | Performed by: FAMILY MEDICINE

## 2022-09-14 PROCEDURE — 1090F PRES/ABSN URINE INCON ASSESS: CPT | Performed by: FAMILY MEDICINE

## 2022-09-14 PROCEDURE — G8754 DIAS BP LESS 90: HCPCS | Performed by: FAMILY MEDICINE

## 2022-09-14 PROCEDURE — G9899 SCRN MAM PERF RSLTS DOC: HCPCS | Performed by: FAMILY MEDICINE

## 2022-09-14 PROCEDURE — 99214 OFFICE O/P EST MOD 30 MIN: CPT | Performed by: FAMILY MEDICINE

## 2022-09-14 PROCEDURE — G8510 SCR DEP NEG, NO PLAN REQD: HCPCS | Performed by: FAMILY MEDICINE

## 2022-09-14 PROCEDURE — 1101F PT FALLS ASSESS-DOCD LE1/YR: CPT | Performed by: FAMILY MEDICINE

## 2022-09-14 PROCEDURE — G8752 SYS BP LESS 140: HCPCS | Performed by: FAMILY MEDICINE

## 2022-09-14 PROCEDURE — G0439 PPPS, SUBSEQ VISIT: HCPCS | Performed by: FAMILY MEDICINE

## 2022-09-14 PROCEDURE — G8536 NO DOC ELDER MAL SCRN: HCPCS | Performed by: FAMILY MEDICINE

## 2022-09-14 PROCEDURE — 90694 VACC AIIV4 NO PRSRV 0.5ML IM: CPT | Performed by: FAMILY MEDICINE

## 2022-09-14 PROCEDURE — G8427 DOCREV CUR MEDS BY ELIG CLIN: HCPCS | Performed by: FAMILY MEDICINE

## 2022-09-14 PROCEDURE — G0463 HOSPITAL OUTPT CLINIC VISIT: HCPCS | Performed by: FAMILY MEDICINE

## 2022-09-14 RX ORDER — PROMETHAZINE HYDROCHLORIDE AND DEXTROMETHORPHAN HYDROBROMIDE 6.25; 15 MG/5ML; MG/5ML
5 SYRUP ORAL
Qty: 180 ML | Refills: 1 | Status: SHIPPED | OUTPATIENT
Start: 2022-09-14

## 2022-09-14 NOTE — PROGRESS NOTES
This is a Subsequent Medicare Annual Wellness Exam (AWV) (Performed 12 months after IPPE or effective date of Medicare Part B enrollment)    I have reviewed the patient's medical history in detail and updated the computerized patient record. History     Patient Active Problem List   Diagnosis Code    Menopause Z78.0    Hypothyroidism E03.9    Anxiety F41.9    GERD (gastroesophageal reflux disease) K21.9    Lung nodule R91.1    Degenerative arthritis of thumb M18.10       Current Outpatient Medications   Medication Sig Dispense Refill    ALPRAZolam (XANAX) 1 mg tablet TAKE 1 TABLET BY MOUTH TWICE A DAY AS NEEDED. MAXIMUM 2 TABLETS PER DAY 60 Tablet 1    omeprazole (PRILOSEC) 20 mg capsule TAKE 1 CAPSULE BY MOUTH EVERY DAY 90 Cap 3    amLODIPine (NORVASC) 2.5 mg tablet TAKE 2 TABS BY MOUTH DAILY. FOR HIGH BLOOD PRESSURE. 180 Tab 2    levothyroxine (SYNTHROID) 75 mcg tablet Take 1 Tab by mouth Daily (before breakfast). 90 Tab 3    calcium-cholecalciferol, d3, (CALCIUM 600 + D) 600-125 mg-unit tab Take 1 Tab by mouth daily. traMADol (ULTRAM) 50 mg tablet Take 50 mg by mouth every six (6) hours as needed for Pain.      vitamin E acetate (VITAMIN E PO) Take 400 mg by mouth daily. black cohosh 40 mg tab Take 1 Tab by mouth daily.       fluticasone (FLONASE) 50 mcg/actuation nasal spray USE TWO SPRAYS IN EACH NOSTRIL DAILY  (Patient taking differently: daily as needed.) 16 g 10       Allergies   Allergen Reactions    Codeine Nausea and Vomiting    Mepivacaine Other (comments)     Mepivacaine 3%   Facial swelling    Novocain [Procaine] Swelling     Facial swelling         Past Medical History:   Diagnosis Date    Anxiety 4/15/2010    Environmental allergies     GERD (gastroesophageal reflux disease)     Hypothyroidism 4/15/2010    Lung nodule     Menopause 4/15/2010    Osteoporosis     Pneumonia     Thyroid disease          Past Surgical History:   Procedure Laterality Date    COLONOSCOPY N/A 7/1/2021 COLONOSCOPY performed by Marimar Joshi MD at [facility] ENDOSCOPY    COLONOSCOPY,DIAGNOSTIC  2021         HX APPENDECTOMY      HX HYSTERECTOMY      CO COLONOSCOPY FLX DX W/COLLJ [address] WHEN PFRMD  2010    [facility]         Family History   Problem Relation Age of Onset    Lung Disease Mother         emphysema    Hypertension Mother     Cancer Father         colon     Alzheimer Brother        Social History     Tobacco Use    Smoking status: Former Smoker     Packs/day: 0.50     Years: 30.00     Pack years: 15.00     Quit date: 2000     Years since quittin.7    Smokeless tobacco: Never Used   Substance Use Topics    Alcohol use: Not Currently     Alcohol/week: 0.0 standard drinks         Depression Risk Factor Screening:     PHQ over the last two weeks    Little interest or pleasure in doing things Not at all   Feeling down, depressed or hopeless Not at all   Total Score PHQ 2 0     Alcohol Risk Factor Screening: You do not drink alcohol or very rarely. Functional Ability and Level of Safety:   Hearing Loss  Hearing is good. Activities of Daily Living  The home contains: discussed safety equipment. Patient does total self care    Fall RiskFall Risk Assessment, last 12 mths    Able to walk? Yes   Fall in past 12 months? No     Functional Ability:   Does the patient exhibit a steady gait? yes    How long did it take the patient to get up and walk from a sitting position? seconds    Is the patient self reliant? (ie can do own laundry, meals, household chores)  yes   Does the patient handle his/her own medications? yes   Does the patient handle his/her own money? yes   Is the patients home safe (ie good lighting, handrails on stairs and bath, etc.)? yes   Did you notice or did patient express any hearing difficulties? no   Did you notice or did patient express any vision difficulties?   no        Advance Care Planning:   Patient was offered the opportunity to discuss advance care planning:  yes Does patient have an Advance Directive:  no   If no, did you provide information on Caring Connections? yes      Abuse Screen  Patient is not abused    Cognitive Screening   Evaluation of Cognitive Function:  Has your family/caregiver stated any concerns about your memory: no      Patient Care Team   Patient Care Team:  Dilma Fatima MD as PCP - General    Assessment/Plan   Education and counseling provided:  Are appropriate based on today's review and evaluation  End-of-Life planning (with patient's consent)    ASSESSMENT and PLAN    Medicare Annual Wellness  Continue current treatment plan. Continue annual follow up. I have discussed diagnosis listed in this note with pt and/or family. I have discussed treatment plans and options and the risk/benefit analysis of those options, including safe use of medications and possible medication side effects. Through the use of shared decision making we have agreed to the above plan. The patient has received an after-visit summary and questions were answered concerning future plans and follow up. Advise pt of any urgent changes then to proceed to the ER.

## 2022-09-14 NOTE — PROGRESS NOTES
Chief Complaint   Patient presents with    Annual Wellness Visit     She keeps a cough and running nose        1. \"Have you been to the ER, urgent care clinic since your last visit? Hospitalized since your last visit? No    2. \"Have you seen or consulted any other health care providers outside of the [address] [place] [address] since your last visit? No     3. For patients aged 39-70: Has the patient had a colonoscopy / FIT/ Cologuard? Yes - no Care Gap present  04/2022    If the patient is female:    4. For patients aged 41-77: Has the patient had a mammogram within the past 2 years? Yes - no Care Gap present      5. For patients aged 21-65: Has the patient had a pap smear?  No    Health Maintenance Due   Topic Date Due    Flu Vaccine (1) 09/01/2022    Medicare Yearly Exam  09/14/2022

## 2022-09-14 NOTE — PROGRESS NOTES
HISTORY OF PRESENT ILLNESS  Salvador Bradley is a 67 y.o. female. HPI   Follow up on chronic medical problems. Overall feeling well. Doing the precautionary measures at home to reduce risks of exposure COVID19. Also wearing mask when she is going out. No known sick contacts or known exposure to [address]. She has not had the COVID19 vaccine. Discussed vaccine and COVID19 illness. Risk and benefits of getting vaccinated reviewed. Cardiovascular Review:  The patient has hypertension and hyperlipidemia. Diet and Lifestyle: generally follows a low fat low cholesterol diet, generally follows a low sodium diet, exercises sporadically  Home BP Monitoring: is not measured at home. Pertinent ROS: taking medications as instructed, no medication side effects noted, no TIA's, no chest pain on exertion, no dyspnea on exertion, no swelling of ankles. Hypercholesterolemia follow up:  Compliant w/ low fat, low cholesterol diet. Exercising some. Patient fasting today. Thyroid Review:  Patient is seen for followup of hypothyroidism. Thyroid ROS: denies heat/cold intolerance, bowel/skin changes or CVS symptoms. Weight is up by 14 pounds in the past 6 months. We discussed her diet and increase with exercise. Has been trying to exercise regularly and sleeping well. Taking multivitamins. Encounter for pain management. 1./2. Medical history/Past medical History  Chronic Pain:  Has osteoarthritis in multple joints. Pain is worse in the thumb and now having pain in the right hand and wrist.  Has slight swelling. Pain in the thumb in particular is severe at times. Using the tramadol for severe pain. Taking NSAIDs upsets her stomach but has been trying to take motrin as needed. She takes the tramadol less than 1 time a month. 3. Applicable records from prior treatment providers are apart of [facility] under the media tab.   4. Diagnostic, therapeutic and laboratory results are available in the [facility] chart. 5. Consultation notes are available for review in the media tab of the [address] [facility] chart. 6. Treatment goals include pain control so that the pt may be as active and function with her daily activities and improved comfort level. Previously pt has been limited by pain. 7. The risks and benefits of treatment has been discussed at this office visit with the pt. She understands that the medication has addicting potential.  Additionally the pt has been advised that narcotic pain medication may impair mental and/or physical ability required for performance of tasks such as driving or operating any other machinery. 8. Pt has an updated signed pain contract on file and can be found under the FYI section of the [facility] chart. 9. The pain contract is reviewed. Pain medication will be continued at the previous dosage. Urine drug screening will not be done today. Diagnostic studies are not indicated at this time. Interventional procedure are not indicated at this time. 10. Medication prescibed is traMADol (ULTRAM) 50 mg tablet.  has not been reviewed at this OV by me since no refill was needed. 11. Patient instructions have been reviewed in detail as outlined above and in the pain contract. 12. Re-eval is planned for 3 months. HM:  Mammogram: 8/31/2021   Bone density 7/24/2020   Colonoscopy 7/1/2021 by Dr. Nurys White in 5 yrs. Patient Active Problem List   Diagnosis Code    Menopause Z78.0    Hypothyroidism E03.9    Anxiety F41.9    GERD (gastroesophageal reflux disease) K21.9    Lung nodule R91.1    Degenerative arthritis of thumb M18.10       Current Outpatient Medications   Medication Sig Dispense Refill    levothyroxine (SYNTHROID) 75 mcg tablet Take 1 Tablet by mouth Daily (before breakfast). 90 Tablet 3    diclofenac (VOLTAREN) 1 % gel APPLY TO AFFECTED AREA FOUR (4) TIMES DAILY AS NEEDED FOR PAIN.  100 g 5    ALPRAZolam (XANAX) 1 mg tablet TAKE 1 TABLET BY MOUTH TWICE A DAY AS NEEDED. MAXIMUM 2 TABLETS PER DAY 60 Tablet 1    amLODIPine (NORVASC) 2.5 mg tablet TAKE 2 TABS BY MOUTH DAILY. FOR HIGH BLOOD PRESSURE. 180 Tablet 3    fluticasone propionate (Flonase Allergy Relief) 50 mcg/actuation nasal spray 2 Sprays by Both Nostrils route daily as needed. ibuprofen (MOTRIN) 800 mg tablet Take 1 Tablet by mouth every eight (8) hours as needed for Pain. 30 Tablet 3    omeprazole (PRILOSEC) 20 mg capsule TAKE 1 CAPSULE BY MOUTH EVERY DAY 90 Capsule 3    mupirocin (BACTROBAN) 2 % ointment Apply  to affected area two (2) times a day. 22 g 1    dextromethorphan-guaiFENesin (Mucinex DM) 60-1,200 mg Tb12 Take 1 Tablet by mouth.      loratadine-pseudoephedrine (Claritin-D 24 Hour)  mg per tablet Take 1 Tablet by mouth daily. calcium-cholecalciferol, d3, (CALCIUM 600 + D) 600-125 mg-unit tab Take 1 Tab by mouth daily. traMADol (ULTRAM) 50 mg tablet Take 50 mg by mouth every six (6) hours as needed for Pain.      vitamin E acetate (VITAMIN E PO) Take 400 mg by mouth daily. black cohosh 40 mg tab Take 1 Tab by mouth daily.       fluticasone (FLONASE) 50 mcg/actuation nasal spray USE TWO SPRAYS IN EACH NOSTRIL DAILY  (Patient taking differently: daily as needed.) 16 g 10       Allergies   Allergen Reactions    Codeine Nausea and Vomiting    Mepivacaine Other (comments)     Mepivacaine 3%   Facial swelling    Novocain [Procaine] Swelling     Facial swelling           Past Medical History:   Diagnosis Date    Anxiety 4/15/2010    Environmental allergies     GERD (gastroesophageal reflux disease)     Hypothyroidism 4/15/2010    Lung nodule     Menopause 4/15/2010    Osteoporosis     Pneumonia     Thyroid disease            Past Surgical History:   Procedure Laterality Date    COLONOSCOPY N/A 7/1/2021    COLONOSCOPY performed by Jahaira Ryan MD at hospitals ENDOSCOPY    COLONOSCOPY,DIAGNOSTIC  7/1/2021         HX APPENDECTOMY      HX HYSTERECTOMY  1983    HI COLONOSCOPY FLX DX W/COLLJ SPEC WHEN PFRMD  2010    nawaf           Family History   Problem Relation Age of Onset    Lung Disease Mother         emphysema    Hypertension Mother     Cancer Father         colon     Alzheimer's Disease Brother        Social History     Tobacco Use    Smoking status: Former     Packs/day: 0.50     Years: 30.00     Pack years: 15.00     Types: Cigarettes     Quit date: 2000     Years since quittin.7    Smokeless tobacco: Never   Substance Use Topics    Alcohol use: Not Currently     Alcohol/week: 0.0 standard drinks        Lab Results   Component Value Date/Time    WBC 5.6 2022 08:10 AM    HGB 13.7 2022 08:10 AM    HCT 42.2 2022 08:10 AM    PLATELET 541  08:10 AM    MCV 91.1 2022 08:10 AM     Lab Results   Component Value Date/Time    Cholesterol, total 181 2022 08:10 AM    HDL Cholesterol 77 2022 08:10 AM    LDL, calculated 90.8 2022 08:10 AM    Triglyceride 66 2022 08:10 AM    CHOL/HDL Ratio 2.4 2022 08:10 AM     Lab Results   Component Value Date/Time    TSH 3.580 2021 08:43 AM    T4, Free 1.56 2015 08:13 AM      Lab Results   Component Value Date/Time    Sodium 139 2022 08:10 AM    Potassium 4.9 2022 08:10 AM    Chloride 106 2022 08:10 AM    CO2 27 2022 08:10 AM    Anion gap 6 2022 08:10 AM    Glucose 90 2022 08:10 AM    BUN 16 2022 08:10 AM    Creatinine 0.71 2022 08:10 AM    BUN/Creatinine ratio 23 (H) 2022 08:10 AM    GFR est AA >60 2022 08:10 AM    GFR est non-AA >60 2022 08:10 AM    Calcium 9.9 2022 08:10 AM    Bilirubin, total 0.6 2022 08:10 AM    ALT (SGPT) 20 2022 08:10 AM    Alk.  phosphatase 82 2022 08:10 AM    Protein, total 6.9 2022 08:10 AM    Albumin 4.1 2022 08:10 AM    Globulin 2.8 2022 08:10 AM    A-G Ratio 1.5 2022 08:10 AM      Lab Results   Component Value Date/Time Hemoglobin A1c 5.3 04/20/2012 10:34 AM    Hemoglobin A1c (POC) 4.9 05/14/2018 10:15 AM         Review of Systems   Constitutional:  Negative for malaise/fatigue. HENT:  Negative for congestion. Eyes:  Negative for blurred vision. Respiratory:  Negative for cough and shortness of breath. Has had a cough off and on over the past year or so. Get coughing spells but not sure what triggers the cough. She is taking her allergy medication and using Flonase. Non smoker and no second hand smoke in the home. No pets. No chest pain , wheezing or SOB. Cardiovascular:  Negative for chest pain, palpitations and leg swelling. Gastrointestinal:  Negative for abdominal pain, constipation and heartburn. Genitourinary:  Negative for dysuria, frequency and urgency. Musculoskeletal:  Negative for back pain and joint pain. Neurological:  Negative for dizziness, tingling and headaches. Endo/Heme/Allergies:  Negative for environmental allergies. Psychiatric/Behavioral:  Negative for depression. The patient does not have insomnia. Physical Exam  Vitals and nursing note reviewed. Constitutional:       Appearance: Normal appearance. She is well-developed. HENT:      Right Ear: Tympanic membrane and ear canal normal.      Left Ear: Tympanic membrane and ear canal normal.   Neck:      Thyroid: No thyromegaly. Vascular: No carotid bruit. Cardiovascular:      Rate and Rhythm: Normal rate and regular rhythm. Pulses: Normal pulses. Heart sounds: Normal heart sounds. Pulmonary:      Effort: Pulmonary effort is normal.      Breath sounds: Normal breath sounds. No decreased air movement. No decreased breath sounds, wheezing or rhonchi. Chest:   Breasts:     Right: Normal. No axillary adenopathy or supraclavicular adenopathy. Left: Normal. No axillary adenopathy or supraclavicular adenopathy. Abdominal:      General: Bowel sounds are normal.      Palpations: Abdomen is soft.  There is no mass. Tenderness: There is no abdominal tenderness. Musculoskeletal:         General: No swelling. Normal range of motion. Cervical back: Normal range of motion and neck supple. Right lower leg: No edema. Left lower leg: No edema. Lymphadenopathy:      Cervical: No cervical adenopathy. Upper Body:      Right upper body: No supraclavicular, axillary or pectoral adenopathy. Left upper body: No supraclavicular, axillary or pectoral adenopathy. Skin:     General: Skin is warm and dry. Neurological:      General: No focal deficit present. Mental Status: She is alert and oriented to person, place, and time. Psychiatric:         Mood and Affect: Mood normal.       ASSESSMENT and PLAN  Diagnoses and all orders for this visit:    1. Medicare annual wellness visit, subsequent    2. Essential hypertension  Stable     3. Hypercholesterolemia  Stable LDL on last blood draw. Continue to monitor. Work on diet and exercise. 4. Hypothyroidism due to acquired atrophy of thyroid  -     TSH 3RD GENERATION; Future    5. Allergic cough  -     XR CHEST PA LAT; Future  -     promethazine-dextromethorphan (PROMETHAZINE-DM) 6.25-15 mg/5 mL syrup; Take 5 mL by mouth every six (6) hours as needed for Cough. Discussed avoiding potential triggers. Cover mattress and pills. Avoid dust and mold. 6. Anxiety  Stable on prn xanax     7. Arthralgia of multiple joints  8. Encounter for chronic pain management  Stable on current regime. The pt has signed medication agreement. Pain contract is reviewed. Pain medications will be continued at the previous dosage. Urine drug screening will be done today. Diagnostic  studies are not indicated at this time. Interventional procedure are not indicated at this time. Re-eval in 3 months. 9. Other osteoporosis without current pathological fracture  Discussed results and treatment options. She is interested in prolia.     -     VITAMIN D, 25 HYDROXY; Future  -     denosumab (PROLIA) 60 mg/mL injection; 1 mL by SubCUTAneous route once for 1 dose. 10. Needs flu shot  -     INFLUENZA, FLUAD, (AGE 65 Y+), IM, PF, 0.5 ML    11. Encounter for medication monitoring  -     METABOLIC PANEL, BASIC; Future        Follow-up and Dispositions    Return in about 6 months (around 3/14/2023). reviewed diet, exercise and weight control  cardiovascular risk and specific lipid/LDL goals reviewed  reviewed medications and side effects in detail    I have discussed diagnosis listed in this note with pt and/or family. I have discussed treatment plans and options and the risk/benefit analysis of those options, including safe use of medications and possible medication side effects. Through the use of shared decision making we have agreed to the above plan. The patient has received an after-visit summary and questions were answered concerning future plans and follow up. Advise pt of any urgent changes then to proceed to the ER.

## 2022-09-15 LAB — TSH SERPL DL<=0.005 MIU/L-ACNC: 1.98 UIU/ML (ref 0.45–4.5)

## 2022-09-27 DIAGNOSIS — F41.9 ANXIETY: ICD-10-CM

## 2022-09-27 RX ORDER — ALPRAZOLAM 1 MG/1
TABLET ORAL
Qty: 60 TABLET | Refills: 1 | Status: SHIPPED | OUTPATIENT
Start: 2022-09-27

## 2022-11-25 DIAGNOSIS — F41.9 ANXIETY: ICD-10-CM

## 2022-11-28 RX ORDER — ALPRAZOLAM 1 MG/1
TABLET ORAL
Qty: 60 TABLET | Refills: 1 | Status: SHIPPED | OUTPATIENT
Start: 2022-11-28

## 2022-12-18 DIAGNOSIS — K21.9 GASTROESOPHAGEAL REFLUX DISEASE: ICD-10-CM

## 2022-12-19 RX ORDER — OMEPRAZOLE 20 MG/1
CAPSULE, DELAYED RELEASE ORAL
Qty: 90 CAPSULE | Refills: 3 | Status: SHIPPED | OUTPATIENT
Start: 2022-12-19

## 2023-01-23 DIAGNOSIS — F41.9 ANXIETY: ICD-10-CM

## 2023-01-23 RX ORDER — ALPRAZOLAM 1 MG/1
TABLET ORAL
Qty: 60 TABLET | Refills: 1 | Status: SHIPPED | OUTPATIENT
Start: 2023-01-23

## 2023-03-10 ENCOUNTER — OFFICE VISIT (OUTPATIENT)
Dept: FAMILY MEDICINE CLINIC | Age: 73
End: 2023-03-10

## 2023-03-10 VITALS
DIASTOLIC BLOOD PRESSURE: 74 MMHG | BODY MASS INDEX: 25.19 KG/M2 | RESPIRATION RATE: 12 BRPM | SYSTOLIC BLOOD PRESSURE: 137 MMHG | HEIGHT: 61 IN | TEMPERATURE: 97.9 F | OXYGEN SATURATION: 97 % | WEIGHT: 133.4 LBS | HEART RATE: 79 BPM

## 2023-03-10 DIAGNOSIS — M25.50 ARTHRALGIA OF MULTIPLE JOINTS: ICD-10-CM

## 2023-03-10 DIAGNOSIS — F41.9 ANXIETY: ICD-10-CM

## 2023-03-10 DIAGNOSIS — E03.4 HYPOTHYROIDISM DUE TO ACQUIRED ATROPHY OF THYROID: ICD-10-CM

## 2023-03-10 DIAGNOSIS — G89.29 ENCOUNTER FOR CHRONIC PAIN MANAGEMENT: ICD-10-CM

## 2023-03-10 DIAGNOSIS — E78.00 HYPERCHOLESTEROLEMIA: ICD-10-CM

## 2023-03-10 DIAGNOSIS — Z51.81 ENCOUNTER FOR MEDICATION MONITORING: ICD-10-CM

## 2023-03-10 DIAGNOSIS — I10 ESSENTIAL HYPERTENSION: Primary | ICD-10-CM

## 2023-03-10 DIAGNOSIS — K21.9 GASTROESOPHAGEAL REFLUX DISEASE WITHOUT ESOPHAGITIS: ICD-10-CM

## 2023-03-10 LAB
ALBUMIN SERPL-MCNC: 4.6 G/DL (ref 3.5–5)
ALBUMIN/GLOB SERPL: 1.9 (ref 1.1–2.2)
ALP SERPL-CCNC: 78 U/L (ref 45–117)
ALT SERPL-CCNC: 25 U/L (ref 12–78)
ANION GAP SERPL CALC-SCNC: 4 MMOL/L (ref 5–15)
AST SERPL-CCNC: 14 U/L (ref 15–37)
BILIRUB SERPL-MCNC: 0.6 MG/DL (ref 0.2–1)
BUN SERPL-MCNC: 17 MG/DL (ref 6–20)
BUN/CREAT SERPL: 21 (ref 12–20)
CALCIUM SERPL-MCNC: 9.6 MG/DL (ref 8.5–10.1)
CHLORIDE SERPL-SCNC: 107 MMOL/L (ref 97–108)
CHOLEST SERPL-MCNC: 208 MG/DL
CO2 SERPL-SCNC: 31 MMOL/L (ref 21–32)
CREAT SERPL-MCNC: 0.8 MG/DL (ref 0.55–1.02)
ERYTHROCYTE [DISTWIDTH] IN BLOOD BY AUTOMATED COUNT: 12.2 % (ref 11.5–14.5)
GLOBULIN SER CALC-MCNC: 2.4 G/DL (ref 2–4)
GLUCOSE SERPL-MCNC: 89 MG/DL (ref 65–100)
HCT VFR BLD AUTO: 42.9 % (ref 35–47)
HDLC SERPL-MCNC: 75 MG/DL
HDLC SERPL: 2.8 (ref 0–5)
HGB BLD-MCNC: 14.5 G/DL (ref 11.5–16)
LDLC SERPL CALC-MCNC: 119.6 MG/DL (ref 0–100)
MCH RBC QN AUTO: 30.1 PG (ref 26–34)
MCHC RBC AUTO-ENTMCNC: 33.8 G/DL (ref 30–36.5)
MCV RBC AUTO: 89 FL (ref 80–99)
NRBC # BLD: 0 K/UL (ref 0–0.01)
NRBC BLD-RTO: 0 PER 100 WBC
PLATELET # BLD AUTO: 248 K/UL (ref 150–400)
PMV BLD AUTO: 10.9 FL (ref 8.9–12.9)
POTASSIUM SERPL-SCNC: 4.8 MMOL/L (ref 3.5–5.1)
PROT SERPL-MCNC: 7 G/DL (ref 6.4–8.2)
RBC # BLD AUTO: 4.82 M/UL (ref 3.8–5.2)
SODIUM SERPL-SCNC: 142 MMOL/L (ref 136–145)
TRIGL SERPL-MCNC: 67 MG/DL (ref ?–150)
VLDLC SERPL CALC-MCNC: 13.4 MG/DL
WBC # BLD AUTO: 5 K/UL (ref 3.6–11)

## 2023-03-10 RX ORDER — DEXTROMETHORPHAN POLISTIREX 30 MG/5ML
60 SUSPENSION ORAL
COMMUNITY

## 2023-03-10 RX ORDER — FEXOFENADINE HCL AND PSEUDOEPHEDRINE HCI 180; 240 MG/1; MG/1
1 TABLET, EXTENDED RELEASE ORAL
COMMUNITY

## 2023-03-10 RX ORDER — TRAMADOL HYDROCHLORIDE 50 MG/1
50 TABLET ORAL
Qty: 30 TABLET | Refills: 0 | Status: SHIPPED | OUTPATIENT
Start: 2023-03-10 | End: 2023-06-08

## 2023-03-10 NOTE — PROGRESS NOTES
Patient identified by 2 identifiers. Chief Complaint   Patient presents with    Follow-up    Hypertension     1. Have you been to the ER, urgent care clinic since your last visit? Hospitalized since your last visit? No    2. Have you seen or consulted any other health care providers outside of the [address] [place] [address] since your last visit? Include any pap smears or colon screening.  No

## 2023-03-10 NOTE — PROGRESS NOTES
HISTORY OF PRESENT ILLNESS  Nevaeh Martinez is a 68 y.o. female. HPI   Follow up on chronic medical problems. Overall feeling well. Doing the precautionary measures at home to reduce risks of exposure COVID19. Also wearing mask when she is going out. No known sick contacts or known exposure to [address]. She has not had the COVID19 vaccine. Discussed vaccine and COVID19 illness. Risk and benefits of getting vaccinated reviewed. Cardiovascular Review:  The patient has hypertension and hyperlipidemia. Diet and Lifestyle: generally follows a low fat low cholesterol diet, generally follows a low sodium diet, exercises sporadically  Home BP Monitoring: is not measured at home. Pertinent ROS: taking medications as instructed, no medication side effects noted, no TIA's, no chest pain on exertion, no dyspnea on exertion, no swelling of ankles. Hypercholesterolemia follow up:  Compliant w/ low fat, low cholesterol diet. Exercising some. Patient fasting today. Thyroid Review:  Patient is seen for followup of hypothyroidism. Thyroid ROS: denies heat/cold intolerance, bowel/skin changes or CVS symptoms. Weight is up by 14 pounds in the past 6 months. We discussed her diet and increase with exercise. Has been trying to exercise regularly and sleeping well. Taking multivitamins. Encounter for pain management. 1./2. Medical history/Past medical History  Chronic Pain:  Has osteoarthritis in multple joints. Pain is worse in the thumb and now having pain in the right hand and wrist.  Has slight swelling. Pain in the thumb in particular is severe at times. Using the tramadol for severe pain. Taking NSAIDs upsets her stomach but has been trying to take motrin as needed. She takes the tramadol less than 1 time a month. 3. Applicable records from prior treatment providers are apart of [facility] under the media tab.   4. Diagnostic, therapeutic and laboratory results are available in the [facility] chart. 5. Consultation notes are available for review in the media tab of the [facility] chart. 6. Treatment goals include pain control so that the pt may be as active and function with her daily activities and improved comfort level. Previously pt has been limited by pain. 7. The risks and benefits of treatment has been discussed at this office visit with the pt. She understands that the medication has addicting potential.  Additionally the pt has been advised that narcotic pain medication may impair mental and/or physical ability required for performance of tasks such as driving or operating any other machinery. 8. Pt has an updated signed pain contract on file and can be found under the FYI section of the [facility] chart. 9. The pain contract is reviewed. Pain medication will be continued at the previous dosage. Urine drug screening will not be done today. Diagnostic studies are not indicated at this time. Interventional procedure are not indicated at this time. 10. Medication prescibed is traMADol (ULTRAM) 50 mg tablet.  has not been reviewed at this OV by me since no refill was needed. 11. Patient instructions have been reviewed in detail as outlined above and in the pain contract. 12. Re-eval is planned for 3 months. HM:  Mammogram: 8/12/22   Bone density 7/24/2020   Colonoscopy 7/1/2021 by Dr. Maria Luisa Garcia in 5 yrs. Patient Active Problem List   Diagnosis Code    Menopause Z78.0    Hypothyroidism E03.9    Anxiety F41.9    GERD (gastroesophageal reflux disease) K21.9    Lung nodule R91.1    Degenerative arthritis of thumb M18.10       Current Outpatient Medications   Medication Sig Dispense Refill    ALPRAZolam (XANAX) 1 mg tablet TAKE 1 TABLET BY MOUTH TWICE A DAY AS NEEDED.  MAXIMUM 2 TABLETS PER DAY 60 Tablet 1    omeprazole (PRILOSEC) 20 mg capsule TAKE 1 CAPSULE BY MOUTH EVERY DAY 90 Capsule 3    ascorbic acid/vitamin E/biotin (HAIR, SKIN, NAILS WITH BIOTIN PO) Take  by mouth.      promethazine-dextromethorphan (PROMETHAZINE-DM) 6.25-15 mg/5 mL syrup Take 5 mL by mouth every six (6) hours as needed for Cough. 180 mL 1    diclofenac (VOLTAREN) 1 % gel APPLY TO AFFECTED AREA FOUR (4) TIMES DAILY AS NEEDED FOR PAIN. 100 g 5    amLODIPine (NORVASC) 2.5 mg tablet TAKE 2 TABS BY MOUTH DAILY. FOR HIGH BLOOD PRESSURE. 180 Tablet 3    levothyroxine (SYNTHROID) 75 mcg tablet Take 1 Tablet by mouth Daily (before breakfast). 90 Tablet 3    fluticasone propionate (FLONASE) 50 mcg/actuation nasal spray 2 Sprays by Both Nostrils route daily as needed. ibuprofen (MOTRIN) 800 mg tablet Take 1 Tablet by mouth every eight (8) hours as needed for Pain. 30 Tablet 3    mupirocin (BACTROBAN) 2 % ointment Apply  to affected area two (2) times a day. 22 g 1    loratadine-pseudoephedrine (Claritin-D 24 Hour)  mg per tablet Take 1 Tablet by mouth daily. calcium-cholecalciferol, d3, (CALCIUM 600 + D) 600-125 mg-unit tab Take 1 Tab by mouth daily. traMADol (ULTRAM) 50 mg tablet Take 50 mg by mouth every six (6) hours as needed for Pain.      vitamin E acetate (VITAMIN E PO) Take 400 mg by mouth daily. black cohosh 40 mg tab Take 1 Tab by mouth daily.          Allergies   Allergen Reactions    Codeine Nausea and Vomiting    Mepivacaine Other (comments)     Mepivacaine 3%   Facial swelling    Novocain [Procaine] Swelling     Facial swelling           Past Medical History:   Diagnosis Date    Anxiety 4/15/2010    Environmental allergies     GERD (gastroesophageal reflux disease)     Hypothyroidism 4/15/2010    Lung nodule     Menopause 4/15/2010    Osteoporosis     Pneumonia     Thyroid disease            Past Surgical History:   Procedure Laterality Date    COLONOSCOPY N/A 7/1/2021    COLONOSCOPY performed by Mabel Oscar MD at [address]  7/1/2021         HX APPENDECTOMY      HX HYSTERECTOMY  1983    [place] COLONOSCOPY FLX DX W/COLLJ [address] [facility] [address] [facility]  2010    nawaf           Family History   Problem Relation Age of Onset    Lung Disease Mother         emphysema    Hypertension Mother     Cancer Father         colon     Alzheimer's Disease Brother        Social History     Tobacco Use    Smoking status: Former     Packs/day: 0.50     Years: 30.00     Pack years: 15.00     Types: Cigarettes     Quit date: 2000     Years since quittin.2    Smokeless tobacco: Never   Substance Use Topics    Alcohol use: Not Currently     Alcohol/week: 0.0 standard drinks        Lab Results   Component Value Date/Time    WBC 5.6 2022 08:10 AM    HGB 13.7 2022 08:10 AM    HCT 42.2 2022 08:10 AM    PLATELET 559  08:10 AM    MCV 91.1 2022 08:10 AM     Lab Results   Component Value Date/Time    Cholesterol, total 181 2022 08:10 AM    HDL Cholesterol 77 2022 08:10 AM    LDL, calculated 90.8 2022 08:10 AM    Triglyceride 66 2022 08:10 AM    CHOL/HDL Ratio 2.4 2022 08:10 AM     Lab Results   Component Value Date/Time    TSH 1.980 2022 12:00 AM    T4, Free 1.56 2015 08:13 AM      Lab Results   Component Value Date/Time    Sodium 140 2022 08:40 AM    Potassium 4.5 2022 08:40 AM    Chloride 107 2022 08:40 AM    CO2 30 2022 08:40 AM    Anion gap 3 (L) 2022 08:40 AM    Glucose 97 2022 08:40 AM    BUN 19 2022 08:40 AM    Creatinine 0.83 2022 08:40 AM    BUN/Creatinine ratio 23 (H) 2022 08:40 AM    GFR est AA >60 2022 08:40 AM    GFR est non-AA >60 2022 08:40 AM    Calcium 10.3 (H) 2022 08:40 AM    Bilirubin, total 0.6 2022 08:10 AM    ALT (SGPT) 20 2022 08:10 AM    Alk.  phosphatase 82 2022 08:10 AM    Protein, total 6.9 2022 08:10 AM    Albumin 4.1 2022 08:10 AM    Globulin 2.8 2022 08:10 AM    A-G Ratio 1.5 2022 08:10 AM      Lab Results   Component Value Date/Time    Hemoglobin A1c 5.3 04/20/2012 10:34 AM    Hemoglobin A1c (POC) 4.9 05/14/2018 10:15 AM         Review of Systems   Constitutional:  Negative for malaise/fatigue. HENT:  Negative for congestion. Eyes:  Negative for blurred vision. Respiratory:  Negative for cough and shortness of breath. Cardiovascular:  Negative for chest pain, palpitations and leg swelling. Gastrointestinal:  Negative for abdominal pain, constipation and heartburn. Genitourinary:  Negative for dysuria, frequency and urgency. Musculoskeletal:  Negative for back pain and joint pain. Neurological:  Negative for dizziness, tingling and headaches. Endo/Heme/Allergies:  Negative for environmental allergies. Psychiatric/Behavioral:  Negative for depression. The patient does not have insomnia. Physical Exam  Vitals and nursing note reviewed. Constitutional:       Appearance: Normal appearance. She is well-developed. Comments: /74   Pulse 79   Temp 97.9 °F (36.6 °C)   Resp 12   Ht 5' 1\" (1.549 m)   Wt 133 lb 6.4 oz (60.5 kg)   LMP 01/01/1983   SpO2 97%   BMI 25.21 kg/m²    HENT:      Right Ear: Tympanic membrane and ear canal normal.      Left Ear: Tympanic membrane and ear canal normal.   Neck:      Thyroid: No thyromegaly. Cardiovascular:      Rate and Rhythm: Normal rate and regular rhythm. Heart sounds: Normal heart sounds. Pulmonary:      Effort: Pulmonary effort is normal.      Breath sounds: Normal breath sounds. Abdominal:      General: Bowel sounds are normal.      Palpations: Abdomen is soft. There is no mass. Tenderness: There is no abdominal tenderness. Musculoskeletal:         General: Normal range of motion. Cervical back: Normal range of motion and neck supple. Right lower leg: No edema. Left lower leg: No edema. Lymphadenopathy:      Cervical: No cervical adenopathy. Skin:     General: Skin is warm and dry. Neurological:      General: No focal deficit present. Mental Status: She is alert and oriented to person, place, and time. Psychiatric:         Mood and Affect: Mood normal.     ASSESSMENT and PLAN  Diagnoses and all orders for this visit:    1. Essential hypertension    2. Hypercholesterolemia  -     LIPID PANEL; Future    3. Hypothyroidism due to acquired atrophy of thyroid  Stable TSH    4. Gastroesophageal reflux disease without esophagitis  Stable     5. Anxiety  Stable     6. Encounter for chronic pain management  -     MONITOR SCREEN 10-DRUG CLASS PROFILE; Future  -     COMPLIANCE DRUG SCREEN/PRESCRIPTION MONITORING; Future  The pt has signed medication agreement. Pain contract is reviewed. Pain medications will be continued at the previous dosage. Urine drug screening will be done today. Diagnostic  studies are not indicated at this time. Interventional procedure are not indicated at this time. Re-eval in 3 months. 7. Arthralgia of multiple joints  -     traMADoL (ULTRAM) 50 mg tablet; Take 1 Tablet by mouth every six (6) hours as needed for Pain for up to 90 days. Max Daily Amount: 200 mg.    8. Encounter for medication monitoring  -     CBC W/O DIFF; Future  -     METABOLIC PANEL, COMPREHENSIVE; Future      Follow-up and Dispositions    Return in about 6 months (around 9/10/2023) for medicare wellness exam.       current treatment plan is effective, no change in therapy  reviewed diet, exercise and weight control  cardiovascular risk and specific lipid/LDL goals reviewed  reviewed medications and side effects in detail    I have discussed diagnosis listed in this note with pt and/or family. I have discussed treatment plans and options and the risk/benefit analysis of those options, including safe use of medications and possible medication side effects. Through the use of shared decision making we have agreed to the above plan. The patient has received an after-visit summary and questions were answered concerning future plans and follow up. Advise pt of any urgent changes then to proceed to the ER.

## 2023-03-10 NOTE — LETTER
CONTROLLED SUBSTANCE MEDICATION AGREEMENT  Patient Name: Jeff Sam  Patient YOB: 1950     I understand, that controlled substance medications may be used to help better manage my symptoms and to improve my ability to function at home, work and in social settings. However, I also understand that these medications do have risks, which have been discussed with me, including possible development of physical or psychological dependence. I understand that successful treatment requires mutual trust and honesty between me and my provider. I understand and agree that following this Medication Agreement is necessary in continuing my provider-patient relationship and the success of my treatment plan. Explanation from my Provider: Benefits and Goals of Controlled Substance Medications: There are two potential goals for your treatment: (1) decreased pain and suffering (2) improved daily life functions. There are many possible treatments for your chronic condition(s). Alternatives such as physical therapy, yoga, massage, home daily exercise, meditation, relaxation techniques, injections, chiropractic manipulations, surgery, cognitive therapy, hypnosis and many medications that are not habit-forming may be used. Use of controlled substance medications may be helpful, but they are unlikely to resolve all symptoms or restore all function. Explanation from my Provider: Risks of Controlled Substance Medications:   Opioid pain medications: These medications can lead to problems such as addiction/dependence, sedation, lightheadedness/dizziness, memory issues, falls, constipation, nausea, or vomiting. They may also impair the ability to drive or operate machinery. Additionally, these medications may lower testosterone levels, leading to loss of bone strength, stamina and sex drive.   They may cause problems with breathing, sleep apnea and reduced coughing, which is especially dangerous for patients with lung disease. Overdose or dangerous interactions with alcohol and other medications may occur, leading to death. Hyperalgesia may develop, which means patients receiving opioids for the treatment of pain may become more sensitive to certain painful stimuli, and in some cases, experience pain from ordinarily non-painful stimuli. Women between the ages of 14-53 who could become pregnant should carefully weigh the risks and benefits of opioids with their physicians, as these medications increase the risk of pregnancy complications, including miscarriage,  delivery and stillbirth. It is also possible for babies to be born addicted to opioids. Opioid dependence withdrawal symptoms may include; feelings of uneasiness, increased pain, irritability, belly pain, diarrhea, sweats and goose-flesh. Testosterone replacement therapy:  Potential side effects include increased risk of stroke and heart attack, blood clots, increased blood pressure, increased cholesterol, enlarged prostate, sleep apnea, irritability/aggression and other mood disorders, and decreased fertility. David Lindquist (1950)             Page 1 of 4    Initials:_______    Benzodiazepines and non-benzodiazepine sleep medications: These medications can lead to problems such as addiction/dependence, sedation, fatigue, lightheadedness, dizziness, incoordination, falls, depression, hallucinations, and impaired judgment, memory and concentration. The ability to drive and operate machinery may also be affected. Abnormal sleep-related behaviors have been reported, including sleepwalking, driving, making telephone calls, eating, or having sex while not fully awake. These medications can suppress breathing and worsen sleep apnea, particularly when combined with alcohol or other sedating medications, potentially leading to death. Dependence withdrawal symptoms may include tremors, anxiety, hallucinations and seizures.    Stimulants:  Common adverse effects include addiction/dependence, increased blood pressure and heart rate, decreased appetite, nausea, involuntary weight loss, insomnia,  irritability, and headaches. These risks may increase when these medications are combined with other stimulants, such as caffeine pills or energy drinks, certain weight loss supplements and oral decongestants. Dependence withdrawal symptoms may include depressed mood, loss of interest, suicidal thoughts, anxiety, fatigue, appetite changes and agitation. I agree and understand that I and my prescriber have the following rights and responsibilities regarding my treatment plan:   1. MY RIGHTS:  To be informed of my treatment and medication plan. To be an active participant in my health and wellbeing. 2. MY RESPONSIBILITY AND UNDERSTANDING FOR USE OF MEDICATIONS   I will take medications at the dose and frequency as directed. For my safety, I will not increase or change how I take my medications without the recommendation of my healthcare provider.  I will actively participate in any program recommended by my provider which may improve function, including social, physical, psychological programs.  I will not take my medications with alcohol or other drugs not prescribed to me. I understand that drinking alcohol with my medications increases the chances of side effects, including reduced breathing rate and could lead to personal injury when operating machinery.  I understand that if I have a history of substance use disorders, including alcohol or other illicit drugs, that I may be at increased risk of addiction to my medications.  I agree to notify my provider immediately if I should become pregnant so that my treatment plan can be adjusted.    I agree and understand that I shall only receive controlled substance medications from the prescriber that signed this agreement unless there is written agreement among other prescribers of controlled substances outlining the responsibility of the medications being prescribed.  I understand that the if the controlled medication is not helping to achieve goals, the dosage may be tapered and no longer prescribed. 3. MY RESPONSIBILITY FOR COMMUNICATION / PRESCRIPTION RENEWALS   I agree that all controlled substance medications that I take will be prescribed only by my provider. If another healthcare provider prescribes me medication in an emergency, I will notify my provider within seventy-two (72) hours. Demetrius Velasco (1950)             Page 2 of 4    Initials:_______   I will arrange for refills at the prescribed interval ONLY during regular office hours. I will not ask for refills earlier than agreed, after-hours, on holidays or weekends. Refills may take up to 72 hours for processing and prescriptions to reach the pharmacy.  I will inform my other health care providers that I am taking these medications and of the existence of this [id number]. In the event of an emergency, I will provide the same information to the emergency department prescribers.  I will keep my provider updated on the pharmacy I am using for controlled medication prescription filling. 4. MY RESPONSIBILITY FOR PROTECTING MEDICATIONS   I will protect my prescriptions and medications. I understand that lost or misplaced prescriptions will not be replaced.  I will keep medications only for my own use and will not share them with others. I will keep all medications away from children.  I agree that if my medications are adjusted or discontinued, I will properly dispose of any remaining medications. I understand that I will be required to dispose of any remaining controlled medications as, directed by my prescriber, prior to being provided with any prescriptions for other controlled medications.   Medication drop box locations can be found at: [employer]  5. MY RESPONSIBILITY WITH ILLEGAL DRUGS    I will not use illegal or street drugs or another person's prescription medications not prescribed to me.  If there are identified addiction type symptoms, then referral to a program may be provided by my provider and I agree to follow through with this recommendation. 6. MY RESPONSIBILITY FOR COOPERATION WITH INVESTIGATIONS   I understand that my provider will comply with any applicable law and may discuss my use and/or possible misuse/abuse of controlled substances and alcohol, as appropriate, with any health care provider involved in my care, pharmacist, or legal authority.  I authorize my provider and pharmacy to cooperate fully with law enforcement agencies (as permitted by law) in the investigation of any possible misuse, sale, or other diversion of my controlled substances.  I agree to waive any applicable privilege or right of privacy or confidentiality with respect to these authorizations. 7. PROVIDERS RIGHT TO MONITOR FOR SAFETY: PRESCRIPTION MONITORING / DRUG TESTING   I consent to drug/toxicology screening and will submit to a drug screen upon my providers request to assure I am only taking the prescribed drugs for my safety monitoring. I understand that a drug screen is a laboratory test in which a sample of my urine, blood or saliva is checked to see what drugs I have been taking. This may entail an observed urine specimen, which means that a nurse or other health care provider may watch me provide urine, and I will cooperate if I am asked to provide an observed specimen. Ellis Barrera (1950)             Page 3 of 4    Initials:_______  Jose Guadalupe Augustin I understand that my provider will check a copy of my State Prescription Monitoring Program () Report in order to safely prescribe medications.      Pill Counts: I consent to pill counts when requested. I may be asked to bring all my prescribed controlled substance medications, in their original bottles, to all of my scheduled appointments. In addition, my provider may ask me to come to the practice at any time for a random pill count. 8. TERMINATION OF THIS AGREEMENT   For my safety, my prescriber has the right to stop prescribing controlled substance medications and may end this agreement.  Conditions that may result in termination of this agreement:  a. I do not show any improvement in pain, or my activity has not improved. b. I develop rapid tolerance or loss of improvement, as described in my treatment plan.  c. I develop significant side effects from the medication. d. My behavior is not consistent with the responsibilities outlined above, thereby causing safety concerns to continue prescribing controlled substance medications. e. I fail to follow the terms of this agreement. f. Other:____________________________     UNDERSTANDING THIS MEDICATION AGREEMENT:    I have read the above and have had all my questions answered. For chronic disease management, I know that my symptoms can be managed with many types of treatments. A chronic medication trial may be part of my treatment, but I must be an active participant in my care. Medication therapy is only one part of my symptom management plan. In some cases, there may be limited scientific evidence to support the chronic use of certain medications to improve symptoms and daily function. Furthermore, in certain circumstances, there may be scientific information that suggests that the use of chronic controlled substances may worsen my symptoms and increase my risk of unintentional death directly related to this medication therapy. I know that if my provider feels my risk from controlled medications is greater than my benefit, I will have my controlled substance medication(s) compassionately lowered or removed altogether. I further agree to allow this office to contact my HIPAA contact if there are concerns about my safety and use of the controlled medications. I have agreed to use the prescribed controlled substance medications to me as instructed by my provider and as stated in this Medication Agreement. My initial on each page and my signature below shows that I have read each page and I have had the opportunity to ask questions with answers provided by my provider.       Patient Name (Printed): _____________________________________    Patient Signature:  ______________________   Date: _____________      Prescriber Name (Printed): ___________________________________    Prescriber Signature: _____________________  Date: _____________     Joaquim Shepard (1950)             Page 4 of 4

## 2023-03-11 LAB — LABORATORY COMMENT REPORT: NORMAL

## 2023-03-14 RX ORDER — LEVOTHYROXINE SODIUM 75 UG/1
TABLET ORAL
Qty: 90 TABLET | Refills: 3 | Status: SHIPPED | OUTPATIENT
Start: 2023-03-14

## 2023-03-23 DIAGNOSIS — F41.9 ANXIETY: ICD-10-CM

## 2023-03-23 RX ORDER — ALPRAZOLAM 1 MG/1
TABLET ORAL
Qty: 60 TABLET | Refills: 1 | Status: SHIPPED | OUTPATIENT
Start: 2023-03-23

## 2023-05-20 DIAGNOSIS — F41.9 ANXIETY DISORDER, UNSPECIFIED: ICD-10-CM

## 2023-05-22 DIAGNOSIS — F41.9 ANXIETY: Primary | ICD-10-CM

## 2023-05-22 RX ORDER — ALPRAZOLAM 1 MG/1
TABLET ORAL
Qty: 60 TABLET | Refills: 1 | Status: SHIPPED | OUTPATIENT
Start: 2023-05-22 | End: 2023-06-23

## 2023-05-22 RX ORDER — ALPRAZOLAM 1 MG/1
TABLET ORAL
Qty: 60 TABLET | Refills: 1 | OUTPATIENT
Start: 2023-05-22

## 2023-07-06 DIAGNOSIS — I10 ESSENTIAL (PRIMARY) HYPERTENSION: ICD-10-CM

## 2023-07-06 RX ORDER — AMLODIPINE BESYLATE 2.5 MG/1
TABLET ORAL
Qty: 180 TABLET | Refills: 3 | Status: SHIPPED | OUTPATIENT
Start: 2023-07-06

## 2023-07-22 DIAGNOSIS — F41.9 ANXIETY: ICD-10-CM

## 2023-07-24 RX ORDER — ALPRAZOLAM 1 MG/1
TABLET ORAL
Qty: 60 TABLET | Refills: 1 | Status: SHIPPED | OUTPATIENT
Start: 2023-07-24 | End: 2023-08-23

## 2023-08-09 ENCOUNTER — TRANSCRIBE ORDERS (OUTPATIENT)
Dept: SCHEDULING | Age: 73
End: 2023-08-09

## 2023-08-09 DIAGNOSIS — Z12.31 VISIT FOR SCREENING MAMMOGRAM: Primary | ICD-10-CM

## 2023-09-06 ENCOUNTER — HOSPITAL ENCOUNTER (OUTPATIENT)
Facility: HOSPITAL | Age: 73
Discharge: HOME | End: 2023-09-09
Attending: FAMILY MEDICINE
Payer: MEDICARE

## 2023-09-06 VITALS — BODY MASS INDEX: 25.11 KG/M2 | HEIGHT: 61 IN | WEIGHT: 133 LBS

## 2023-09-06 DIAGNOSIS — Z12.31 VISIT FOR SCREENING MAMMOGRAM: ICD-10-CM

## 2023-09-06 PROCEDURE — 77063 BREAST TOMOSYNTHESIS BI: CPT

## 2023-09-15 ENCOUNTER — OFFICE VISIT (OUTPATIENT)
Age: 73
End: 2023-09-15
Payer: MEDICARE

## 2023-09-15 VITALS
DIASTOLIC BLOOD PRESSURE: 80 MMHG | BODY MASS INDEX: 24.32 KG/M2 | OXYGEN SATURATION: 99 % | RESPIRATION RATE: 18 BRPM | SYSTOLIC BLOOD PRESSURE: 130 MMHG | TEMPERATURE: 97.5 F | HEART RATE: 74 BPM | WEIGHT: 128.8 LBS | HEIGHT: 61 IN

## 2023-09-15 DIAGNOSIS — M15.9 PRIMARY OSTEOARTHRITIS INVOLVING MULTIPLE JOINTS: ICD-10-CM

## 2023-09-15 DIAGNOSIS — K21.9 GASTRO-ESOPHAGEAL REFLUX DISEASE WITHOUT ESOPHAGITIS: ICD-10-CM

## 2023-09-15 DIAGNOSIS — I10 ESSENTIAL (PRIMARY) HYPERTENSION: ICD-10-CM

## 2023-09-15 DIAGNOSIS — G89.29 ENCOUNTER FOR CHRONIC PAIN MANAGEMENT: ICD-10-CM

## 2023-09-15 DIAGNOSIS — E03.9 HYPOTHYROIDISM, ACQUIRED: ICD-10-CM

## 2023-09-15 DIAGNOSIS — Z23 ENCOUNTER FOR IMMUNIZATION: ICD-10-CM

## 2023-09-15 DIAGNOSIS — D22.30 CHANGE IN FACIAL MOLE: ICD-10-CM

## 2023-09-15 DIAGNOSIS — Z00.00 MEDICARE ANNUAL WELLNESS VISIT, SUBSEQUENT: Primary | ICD-10-CM

## 2023-09-15 DIAGNOSIS — Z79.899 ENCOUNTER FOR LONG-TERM (CURRENT) USE OF MEDICATIONS: ICD-10-CM

## 2023-09-15 DIAGNOSIS — E78.00 HYPERCHOLESTEROLEMIA: ICD-10-CM

## 2023-09-15 DIAGNOSIS — F41.9 ANXIETY: ICD-10-CM

## 2023-09-15 LAB
ANION GAP SERPL CALC-SCNC: 2 MMOL/L (ref 5–15)
APPEARANCE UR: CLEAR
BACTERIA URNS QL MICRO: ABNORMAL /HPF
BILIRUB UR QL: NEGATIVE
BUN SERPL-MCNC: 20 MG/DL (ref 6–20)
BUN/CREAT SERPL: 27 (ref 12–20)
CALCIUM SERPL-MCNC: 9.9 MG/DL (ref 8.5–10.1)
CHLORIDE SERPL-SCNC: 106 MMOL/L (ref 97–108)
CHOLEST SERPL-MCNC: 224 MG/DL
CO2 SERPL-SCNC: 30 MMOL/L (ref 21–32)
COLOR UR: ABNORMAL
CREAT SERPL-MCNC: 0.75 MG/DL (ref 0.55–1.02)
EPITH CASTS URNS QL MICRO: ABNORMAL /LPF
GLUCOSE SERPL-MCNC: 93 MG/DL (ref 65–100)
GLUCOSE UR STRIP.AUTO-MCNC: NEGATIVE MG/DL
HDLC SERPL-MCNC: 87 MG/DL
HDLC SERPL: 2.6 (ref 0–5)
HGB UR QL STRIP: ABNORMAL
KETONES UR QL STRIP.AUTO: NEGATIVE MG/DL
LDLC SERPL CALC-MCNC: 120.6 MG/DL (ref 0–100)
LEUKOCYTE ESTERASE UR QL STRIP.AUTO: ABNORMAL
NITRITE UR QL STRIP.AUTO: NEGATIVE
PH UR STRIP: 5.5 (ref 5–8)
POTASSIUM SERPL-SCNC: 5.3 MMOL/L (ref 3.5–5.1)
PROT UR STRIP-MCNC: NEGATIVE MG/DL
RBC #/AREA URNS HPF: ABNORMAL /HPF (ref 0–5)
SODIUM SERPL-SCNC: 138 MMOL/L (ref 136–145)
SP GR UR REFRACTOMETRY: 1.02 (ref 1–1.03)
TRIGL SERPL-MCNC: 82 MG/DL
UROBILINOGEN UR QL STRIP.AUTO: 0.2 EU/DL (ref 0.2–1)
VLDLC SERPL CALC-MCNC: 16.4 MG/DL
WBC URNS QL MICRO: ABNORMAL /HPF (ref 0–4)

## 2023-09-15 PROCEDURE — 90674 CCIIV4 VAC NO PRSV 0.5 ML IM: CPT | Performed by: FAMILY MEDICINE

## 2023-09-15 PROCEDURE — 99214 OFFICE O/P EST MOD 30 MIN: CPT | Performed by: FAMILY MEDICINE

## 2023-09-15 RX ORDER — ALPRAZOLAM 1 MG/1
1 TABLET ORAL AS NEEDED
COMMUNITY
End: 2023-09-15 | Stop reason: SDUPTHER

## 2023-09-15 RX ORDER — GARLIC 180 MG
40 TABLET, DELAYED RELEASE (ENTERIC COATED) ORAL DAILY
COMMUNITY

## 2023-09-15 RX ORDER — VITAMIN E 268 MG
400 CAPSULE ORAL DAILY
COMMUNITY

## 2023-09-15 RX ORDER — ALPRAZOLAM 1 MG/1
TABLET ORAL
COMMUNITY
Start: 2023-03-23

## 2023-09-15 RX ORDER — TRAMADOL HYDROCHLORIDE 50 MG/1
50 TABLET ORAL EVERY 6 HOURS PRN
COMMUNITY

## 2023-09-15 ASSESSMENT — PATIENT HEALTH QUESTIONNAIRE - PHQ9
1. LITTLE INTEREST OR PLEASURE IN DOING THINGS: 0
SUM OF ALL RESPONSES TO PHQ QUESTIONS 1-9: 0
2. FEELING DOWN, DEPRESSED OR HOPELESS: 0
SUM OF ALL RESPONSES TO PHQ QUESTIONS 1-9: 0
SUM OF ALL RESPONSES TO PHQ9 QUESTIONS 1 & 2: 0
SUM OF ALL RESPONSES TO PHQ QUESTIONS 1-9: 0
SUM OF ALL RESPONSES TO PHQ QUESTIONS 1-9: 0

## 2023-09-15 ASSESSMENT — ENCOUNTER SYMPTOMS
CONSTIPATION: 0
COUGH: 0
SHORTNESS OF BREATH: 0
RHINORRHEA: 0
CHEST TIGHTNESS: 0
BLOOD IN STOOL: 0
ABDOMINAL PAIN: 0

## 2023-09-15 ASSESSMENT — LIFESTYLE VARIABLES
HOW MANY STANDARD DRINKS CONTAINING ALCOHOL DO YOU HAVE ON A TYPICAL DAY: PATIENT DOES NOT DRINK
HOW OFTEN DO YOU HAVE A DRINK CONTAINING ALCOHOL: NEVER

## 2023-09-15 NOTE — PATIENT INSTRUCTIONS
Advance Directives: Care Instructions  Overview  An advance directive is a legal way to state your wishes at the end of your life. It tells your family and your doctor what to do if you can't say what you want. There are two main types of advance directives. You can change them any time your wishes change. Living will. This form tells your family and your doctor your wishes about life support and other treatment. The form is also called a declaration. Medical power of . This form lets you name a person to make treatment decisions for you when you can't speak for yourself. This person is called a health care agent (health care proxy, health care surrogate). The form is also called a durable power of  for health care. If you do not have an advance directive, decisions about your medical care may be made by a family member, or by a doctor or a  who doesn't know you. It may help to think of an advance directive as a gift to the people who care for you. If you have one, they won't have to make tough decisions by themselves. For more information, including forms for your state, see the [address] [place] [address] website (www.caringinfo.org/planning/advance-directives/). Follow-up care is a key part of your treatment and safety. Be sure to make and go to all appointments, and call your doctor if you are having problems. It's also a good idea to know your test results and keep a list of the medicines you take. What should you include in an advance directive? Many states have a unique advance directive form. (It may ask you to address specific issues.) Or you might use a universal form that's approved by many states. If your form doesn't tell you what to address, it may be hard to know what to include in your advance directive. Use the questions below to help you get started. Who do you want to make decisions about your medical care if you are not able to?   What life-support measures do you want if you

## 2023-09-16 LAB
AMPHETAMINES UR QL SCN: NEGATIVE NG/ML
BARBITURATES UR QL SCN: NEGATIVE NG/ML
BENZODIAZ UR QL SCN: POSITIVE NG/ML
BZE UR QL SCN: NEGATIVE NG/ML
CANNABINOIDS UR QL SCN: NEGATIVE NG/ML
CREAT UR-MCNC: 99.9 MG/DL (ref 20–300)
LABORATORY COMMENT REPORT: ABNORMAL
METHADONE UR QL SCN: NEGATIVE NG/ML
OPIATES UR QL SCN: NEGATIVE NG/ML
OXYCODONE+OXYMORPHONE UR QL SCN: NEGATIVE NG/ML
PCP UR QL: NEGATIVE NG/ML
PH UR: 5.3 (ref 4.5–8.9)
PROPOXYPH UR QL SCN: NEGATIVE NG/ML
TSH SERPL DL<=0.005 MIU/L-ACNC: 0.56 UIU/ML (ref 0.45–4.5)

## 2023-09-21 DIAGNOSIS — F41.9 ANXIETY DISORDER, UNSPECIFIED: ICD-10-CM

## 2023-09-21 RX ORDER — LEVOTHYROXINE SODIUM 0.07 MG/1
TABLET ORAL
Qty: 30 TABLET | Refills: 0
Start: 2023-09-21

## 2023-09-22 ENCOUNTER — TELEPHONE (OUTPATIENT)
Age: 73
End: 2023-09-22

## 2023-09-22 RX ORDER — ALPRAZOLAM 1 MG/1
TABLET ORAL
Qty: 60 TABLET | Refills: 1 | Status: SHIPPED | OUTPATIENT
Start: 2023-09-22 | End: 2024-03-20

## 2023-09-22 NOTE — TELEPHONE ENCOUNTER
Last appointment: 9/15/23  Next appointment: 3/15/24  Previous refill encounter(s): 3/23/23 #60 with 1 refill    Requested Prescriptions     Pending Prescriptions Disp Refills    ALPRAZolam (XANAX) 1 MG tablet [Pharmacy Med Name: ALPRAZOLAM 1 MG TABLET] 60 tablet 5     Sig: TAKE 1 TABLET BY MOUTH TWICE A DAY AS NEEDED. MAXIMUM 2 TABLETS PER DAY         For Pharmacy Admin Tracking Only    Program: Medication Refill  CPA in place:    Recommendation Provided To:    Intervention Detail: New Rx: 1, reason: Patient Preference  Intervention Accepted By:   Leonila Cota Closed?:    Time Spent (min): 5 [FreeTextEntry1] : 83 yr old  DM HTNwith abdominal wall ulcer-  reopened upper ulcer, \par adaptic/ prontosan foam,   use small foam , or abd prontosan,\par vna 3 x a week\par discussed diet\par \par    binder- insurance wouldn’t cover, stopped eo2\par   had closed/ progress with prior to protosan \par  grafix 1/16/20 primatrix placed  10/22 , sep12- aug 30 aug 8\par  \par discussed telehealth visit for next visit with nurse- patient consents 1 month\par  complexlow-lab, xr asha,test reviewed\par risk- low\par

## 2023-09-22 NOTE — TELEPHONE ENCOUNTER
PT needs a refill on her alprazolam 1 mg tablet PT would like it sent to [facility] [place].  She will be out on Sunday     CB# [phone] okay to [facility]ALEY

## 2023-11-10 DIAGNOSIS — M19.049 PRIMARY OSTEOARTHRITIS, UNSPECIFIED HAND: ICD-10-CM

## 2023-11-10 NOTE — TELEPHONE ENCOUNTER
Last appointment: 9/15/23  Next appointment: 3/15/24  Previous refill encounter(s): 8/9/22    Requested Prescriptions     Pending Prescriptions Disp Refills    diclofenac sodium (VOLTAREN) 1 % GEL [Pharmacy Med Name: DICLOFENAC SODIUM 1% GEL] 100 g 5     Sig: APPLY TO AFFECTED AREA FOUR (4) TIMES DAILY AS NEEDED FOR PAIN. For Pharmacy Admin Tracking Only    Program: Medication Refill  CPA in place:    Recommendation Provided To:    Intervention Detail: New Rx: 1, reason: Patient Preference  Intervention Accepted By:   Creed  Closed?:    Time Spent (min): 5

## 2023-11-20 DIAGNOSIS — F41.9 ANXIETY DISORDER, UNSPECIFIED: ICD-10-CM

## 2023-11-22 RX ORDER — ALPRAZOLAM 1 MG/1
TABLET ORAL
Qty: 60 TABLET | Refills: 1 | Status: SHIPPED | OUTPATIENT
Start: 2023-11-22 | End: 2024-05-20

## 2023-11-22 NOTE — TELEPHONE ENCOUNTER
Last appointment: 9/15/23  Next appointment: 3/15/24  Previous refill encounter(s): 9/22/23 #60 with 1 refill    Requested Prescriptions     Pending Prescriptions Disp Refills    ALPRAZolam (XANAX) 1 MG tablet [Pharmacy Med Name: ALPRAZOLAM 1 MG TABLET] 60 tablet 1     Sig: TAKE 1 TABLET BY MOUTH TWICE A DAY AS NEEDED. MAXIMUM 2 TABLETS PER DAY         For Pharmacy Admin Tracking Only    Program: Medication Refill  CPA in place:    Recommendation Provided To:    Intervention Detail: New Rx: 1, reason: Patient Preference  Intervention Accepted By:   Sydney Liao Closed?:    Time Spent (min): 5

## 2023-12-05 RX ORDER — LEVOTHYROXINE SODIUM 0.07 MG/1
TABLET ORAL
Qty: 90 TABLET | Refills: 3 | Status: SHIPPED | OUTPATIENT
Start: 2023-12-05

## 2023-12-05 NOTE — TELEPHONE ENCOUNTER
Rx pended with updated dose as per last lab results. Last appointment: 9/15/23  Next appointment: 3/15/24    Requested Prescriptions     Pending Prescriptions Disp Refills    levothyroxine (SYNTHROID) 75 MCG tablet [Pharmacy Med Name: LEVOTHYROXINE 75 MCG TABLET] 90 tablet 3     Sig: Take 1/2 tab on each Sunday and 1 tab Monday thru Saturday         For Pharmacy Admin Tracking Only    Program: Medication Refill  CPA in place:    Recommendation Provided To:    Intervention Detail: New Rx: 1, reason: Patient Preference  Intervention Accepted By:   Clemente Patten Closed?:    Time Spent (min): 5

## 2023-12-14 DIAGNOSIS — K21.9 GASTRO-ESOPHAGEAL REFLUX DISEASE WITHOUT ESOPHAGITIS: ICD-10-CM

## 2023-12-14 RX ORDER — OMEPRAZOLE 20 MG/1
CAPSULE, DELAYED RELEASE ORAL
Qty: 90 CAPSULE | Refills: 3 | Status: SHIPPED | OUTPATIENT
Start: 2023-12-14

## 2023-12-14 NOTE — TELEPHONE ENCOUNTER
Last appointment: 9/15/23  Next appointment: 3/15/24  Previous refill encounter(s): 12/19/22    Requested Prescriptions     Pending Prescriptions Disp Refills    omeprazole (PRILOSEC) 20 MG delayed release capsule [Pharmacy Med Name: OMEPRAZOLE DR 20 MG CAPSULE] 90 capsule 3     Sig: TAKE 1 CAPSULE BY MOUTH EVERY DAY         For Pharmacy Admin Tracking Only    Program: Medication Refill  CPA in place:    Recommendation Provided To:    Intervention Detail: New Rx: 1, reason: Patient Preference  Intervention Accepted By:   Susana Malone Closed?:    Time Spent (min): 5

## 2024-01-16 ENCOUNTER — TELEPHONE (OUTPATIENT)
Age: 74
End: 2024-01-16

## 2024-01-16 NOTE — TELEPHONE ENCOUNTER
[phone] - Patient has a real bad case of the flu and would like a prescription called in to [facility] in [place].  Symptoms include the following: coughing, sinuses are stuffy and congested in her chest. She has tried over the counter medication but it is not enough.

## 2024-01-16 NOTE — TELEPHONE ENCOUNTER
[phone] - Patient has a real bad case of the flu and would like a prescription called in to [facility] in [place].  Symptoms include the following: coughing, sinuses are stuffy and congested in her chest. She has tried over the counter medication but it is not enough.    -----------------------------------------------------------------------------------------------------------    Pt took a COVID test this morning and it was negative and she is currently taking Delsium, Robitussin DM and Allegra D and she is not better.  Sx started Sunday.

## 2024-01-18 DIAGNOSIS — F41.9 ANXIETY DISORDER, UNSPECIFIED: ICD-10-CM

## 2024-01-18 NOTE — TELEPHONE ENCOUNTER
Last appointment: 9/15/23  Next appointment: 3/15/24  Previous refill encounter(s): 11/22/23 #60 with 1 refill    Requested Prescriptions     Pending Prescriptions Disp Refills    ALPRAZolam (XANAX) 1 MG tablet [Pharmacy Med Name: ALPRAZOLAM 1 MG TABLET] 60 tablet 1     Sig: TAKE 1 TABLET BY MOUTH TWICE A DAY AS NEEDED. MAXIMUM 2 TABLETS PER DAY         For Pharmacy Admin Tracking Only    Program: Medication Refill  CPA in place:    Recommendation Provided To:   Intervention Detail: New Rx: 1, reason: Patient Preference  Intervention Accepted By:   Gap Closed?:    Time Spent (min): 5

## 2024-01-19 RX ORDER — ALPRAZOLAM 1 MG/1
TABLET ORAL
Qty: 60 TABLET | Refills: 1 | Status: SHIPPED | OUTPATIENT
Start: 2024-01-19 | End: 2024-07-17

## 2024-01-22 ENCOUNTER — TELEPHONE (OUTPATIENT)
Age: 74
End: 2024-01-22

## 2024-01-22 NOTE — TELEPHONE ENCOUNTER
Patient  Cesar states that his wife need something today for cough congestion and stuffy sinus please send something into the pharmacy soon he can be reached @ [phone]

## 2024-01-22 NOTE — TELEPHONE ENCOUNTER
Pt state she is still coughing and congested.  She also states that now she has R side pain when she coughs.  She stated her COVID test was negative last week.  Her cough is productive and dry.  She is still taking the Delsium and the Robitussin DM.  She stated Dr. Giordano said she had the flu.  I asked her if she was tested for the flu and she stated someone here told her she had the flu.  I told her that Dr. Giordano,  nor did I tell her that because she has to be tested for the flu and her message that sent stated she has a real bad case of the flu. [phone].

## 2024-01-22 NOTE — TELEPHONE ENCOUNTER
Patient wants to get something called in for a cough and congestion, she has had this since last week.  Please give her a call @ [phone]

## 2024-03-15 ENCOUNTER — OFFICE VISIT (OUTPATIENT)
Age: 74
End: 2024-03-15
Payer: MEDICARE

## 2024-03-15 VITALS
BODY MASS INDEX: 24.77 KG/M2 | SYSTOLIC BLOOD PRESSURE: 114 MMHG | HEART RATE: 85 BPM | HEIGHT: 61 IN | OXYGEN SATURATION: 96 % | WEIGHT: 131.2 LBS | TEMPERATURE: 97.8 F | DIASTOLIC BLOOD PRESSURE: 66 MMHG | RESPIRATION RATE: 16 BRPM

## 2024-03-15 DIAGNOSIS — K21.9 GASTRO-ESOPHAGEAL REFLUX DISEASE WITHOUT ESOPHAGITIS: ICD-10-CM

## 2024-03-15 DIAGNOSIS — Z79.899 ENCOUNTER FOR LONG-TERM (CURRENT) USE OF MEDICATIONS: ICD-10-CM

## 2024-03-15 DIAGNOSIS — F41.9 ANXIETY: ICD-10-CM

## 2024-03-15 DIAGNOSIS — E03.9 HYPOTHYROIDISM, ACQUIRED: ICD-10-CM

## 2024-03-15 DIAGNOSIS — I10 ESSENTIAL (PRIMARY) HYPERTENSION: Primary | ICD-10-CM

## 2024-03-15 DIAGNOSIS — E78.00 HYPERCHOLESTEROLEMIA: ICD-10-CM

## 2024-03-15 DIAGNOSIS — F45.8 NERVOUS STOMACH: ICD-10-CM

## 2024-03-15 LAB
ALBUMIN SERPL-MCNC: 4.2 G/DL (ref 3.5–5)
ALBUMIN/GLOB SERPL: 1.4 (ref 1.1–2.2)
ALP SERPL-CCNC: 88 U/L (ref 45–117)
ALT SERPL-CCNC: 21 U/L (ref 12–78)
ANION GAP SERPL CALC-SCNC: 4 MMOL/L (ref 5–15)
AST SERPL-CCNC: 14 U/L (ref 15–37)
BILIRUB SERPL-MCNC: 0.8 MG/DL (ref 0.2–1)
BUN SERPL-MCNC: 16 MG/DL (ref 6–20)
BUN/CREAT SERPL: 17 (ref 12–20)
CALCIUM SERPL-MCNC: 9.8 MG/DL (ref 8.5–10.1)
CHLORIDE SERPL-SCNC: 106 MMOL/L (ref 97–108)
CHOLEST SERPL-MCNC: 206 MG/DL
CO2 SERPL-SCNC: 27 MMOL/L (ref 21–32)
CREAT SERPL-MCNC: 0.95 MG/DL (ref 0.55–1.02)
ERYTHROCYTE [DISTWIDTH] IN BLOOD BY AUTOMATED COUNT: 12.4 % (ref 11.5–14.5)
GLOBULIN SER CALC-MCNC: 2.9 G/DL (ref 2–4)
GLUCOSE SERPL-MCNC: 86 MG/DL (ref 65–100)
HCT VFR BLD AUTO: 42.7 % (ref 35–47)
HDLC SERPL-MCNC: 84 MG/DL
HDLC SERPL: 2.5 (ref 0–5)
HGB BLD-MCNC: 14.7 G/DL (ref 11.5–16)
LDLC SERPL CALC-MCNC: 110.6 MG/DL (ref 0–100)
MCH RBC QN AUTO: 30.9 PG (ref 26–34)
MCHC RBC AUTO-ENTMCNC: 34.4 G/DL (ref 30–36.5)
MCV RBC AUTO: 89.7 FL (ref 80–99)
NRBC # BLD: 0 K/UL (ref 0–0.01)
NRBC BLD-RTO: 0 PER 100 WBC
PLATELET # BLD AUTO: 276 K/UL (ref 150–400)
PMV BLD AUTO: 10.6 FL (ref 8.9–12.9)
POTASSIUM SERPL-SCNC: 4.9 MMOL/L (ref 3.5–5.1)
PROT SERPL-MCNC: 7.1 G/DL (ref 6.4–8.2)
RBC # BLD AUTO: 4.76 M/UL (ref 3.8–5.2)
SODIUM SERPL-SCNC: 137 MMOL/L (ref 136–145)
TRIGL SERPL-MCNC: 57 MG/DL
TSH SERPL DL<=0.05 MIU/L-ACNC: 4.17 UIU/ML (ref 0.36–3.74)
VLDLC SERPL CALC-MCNC: 11.4 MG/DL
WBC # BLD AUTO: 5.4 K/UL (ref 3.6–11)

## 2024-03-15 PROCEDURE — 3078F DIAST BP <80 MM HG: CPT | Performed by: FAMILY MEDICINE

## 2024-03-15 PROCEDURE — G8399 PT W/DXA RESULTS DOCUMENT: HCPCS | Performed by: FAMILY MEDICINE

## 2024-03-15 PROCEDURE — G8420 CALC BMI NORM PARAMETERS: HCPCS | Performed by: FAMILY MEDICINE

## 2024-03-15 PROCEDURE — 99214 OFFICE O/P EST MOD 30 MIN: CPT | Performed by: FAMILY MEDICINE

## 2024-03-15 PROCEDURE — 1123F ACP DISCUSS/DSCN MKR DOCD: CPT | Performed by: FAMILY MEDICINE

## 2024-03-15 PROCEDURE — 1090F PRES/ABSN URINE INCON ASSESS: CPT | Performed by: FAMILY MEDICINE

## 2024-03-15 PROCEDURE — G8427 DOCREV CUR MEDS BY ELIG CLIN: HCPCS | Performed by: FAMILY MEDICINE

## 2024-03-15 PROCEDURE — 1036F TOBACCO NON-USER: CPT | Performed by: FAMILY MEDICINE

## 2024-03-15 PROCEDURE — 3017F COLORECTAL CA SCREEN DOC REV: CPT | Performed by: FAMILY MEDICINE

## 2024-03-15 PROCEDURE — 3074F SYST BP LT 130 MM HG: CPT | Performed by: FAMILY MEDICINE

## 2024-03-15 PROCEDURE — G8482 FLU IMMUNIZE ORDER/ADMIN: HCPCS | Performed by: FAMILY MEDICINE

## 2024-03-15 RX ORDER — HYOSCYAMINE SULFATE 0.12 MG/1
1 TABLET SUBLINGUAL EVERY 4 HOURS PRN
Qty: 30 EACH | Refills: 1 | Status: SHIPPED | OUTPATIENT
Start: 2024-03-15

## 2024-03-15 RX ORDER — LEVOTHYROXINE SODIUM 0.07 MG/1
75 TABLET ORAL DAILY
Qty: 90 TABLET | Refills: 3 | Status: SHIPPED | OUTPATIENT
Start: 2024-03-15

## 2024-03-15 RX ORDER — ALBUTEROL SULFATE 90 UG/1
2 AEROSOL, METERED RESPIRATORY (INHALATION) EVERY 4 HOURS PRN
COMMUNITY
Start: 2024-01-22

## 2024-03-15 ASSESSMENT — PATIENT HEALTH QUESTIONNAIRE - PHQ9
1. LITTLE INTEREST OR PLEASURE IN DOING THINGS: 0
SUM OF ALL RESPONSES TO PHQ QUESTIONS 1-9: 0
2. FEELING DOWN, DEPRESSED OR HOPELESS: 0
SUM OF ALL RESPONSES TO PHQ QUESTIONS 1-9: 0
SUM OF ALL RESPONSES TO PHQ9 QUESTIONS 1 & 2: 0

## 2024-03-15 ASSESSMENT — ENCOUNTER SYMPTOMS
ABDOMINAL PAIN: 0
CONSTIPATION: 0
CHEST TIGHTNESS: 0
RHINORRHEA: 0
COUGH: 0
SHORTNESS OF BREATH: 0
BLOOD IN STOOL: 0

## 2024-03-15 NOTE — PROGRESS NOTES
Subjective:      Patient ID: Marta Arreguin is a 74 y.o. female.    HPI  Follow up on chronic medical problems.  Overall feeling well.    C/o \"nervous stomach\".  Usually has stomach pain and then has to go to the [place] with diarrheas when she is out shopping or out to eat.  .  Seem that she gets \"excited\" that triggers her sx.  Would like something that she can't take to help with sx when it does occur.  Never occurs when she is at home.  No particular food triggers noted.    Cardiovascular Review:  The patient has hypertension and hyperlipidemia.  Diet and Lifestyle: generally follows a low fat low cholesterol diet, generally follows a low sodium diet, exercises sporadically  Home BP Monitoring: is not measured at home.  Pertinent ROS: taking medications as instructed, no medication side effects noted, no TIA's, no chest pain on exertion, no dyspnea on exertion, no swelling of ankles.   Hypercholesterolemia follow up:  Compliant w/ low fat, low cholesterol diet.  Exercising some.  Patient fasting today.  Thyroid Review:  Patient is seen for followup of hypothyroidism.   Thyroid ROS: denies heat/cold intolerance, bowel/skin changes or CVS symptoms.  Weight is up by 14 pounds in the past 6 months.  We discussed her diet and increase with exercise.  Has been trying to exercise regularly and sleeping well.  Taking multivitamins.      Osteoarthritis:  Patient has osteoarthritis.  Has osteoarthritis in multple joints.  Pain is worse in the thumb and now having pain in the right hand and wrist.  Has slight swelling.  Pain in the thumb in particular is severe at times.  Using the tramadol for severe pain but has not been recently taking it for pain.  Last refill was 3/2023.  Has been taking ibuprofen which she has been tolerating better without GI upset.    Symptoms onset: problem is longstanding.  Rheumatological ROS: stable, mild-to-moderate joint symptoms intermittently, reasonably well controlled by PRN meds.   Response to

## 2024-03-15 NOTE — PROGRESS NOTES
Chief Complaint   Patient presents with    6 Month Follow-Up       \"Have you been to the ER, urgent care clinic since your last visit?  Hospitalized since your last visit?\"    Yes, [facility] in 2024 for RSV.    “Have you seen or consulted any other health care providers outside of [facility] since your last visit?”    NO             Vitals:    03/15/24 0751   BP: 114/66   Pulse: 85   Resp: 16   Temp: 97.8 °F (36.6 °C)   SpO2: 96%       Health Maintenance Due   Topic Date Due    Respiratory Syncytial Virus (RSV) Pregnant or age 60 yrs+ (1 - 1-dose 60+ series) Never done        The patient, Marta Arreguin, identity was verified by name and .

## 2024-03-20 DIAGNOSIS — F41.9 ANXIETY DISORDER, UNSPECIFIED: ICD-10-CM

## 2024-03-21 RX ORDER — ALPRAZOLAM 1 MG/1
TABLET ORAL
Qty: 60 TABLET | Refills: 1 | Status: SHIPPED | OUTPATIENT
Start: 2024-03-21 | End: 2024-09-17

## 2024-03-21 NOTE — TELEPHONE ENCOUNTER
Last appointment: 3/15/24  Next appointment: 9/20/24  Previous refill encounter(s): 1/19/24 #60 with 1 refill    Requested Prescriptions     Pending Prescriptions Disp Refills    ALPRAZolam (XANAX) 1 MG tablet [Pharmacy Med Name: ALPRAZOLAM 1 MG TABLET] 60 tablet 1     Sig: TAKE 1 TABLET BY MOUTH TWICE A DAY AS NEEDED. MAXIMUM 2 TABLETS PER DAY         For Pharmacy Admin Tracking Only    Program: Medication Refill  CPA in place:    Recommendation Provided To:   Intervention Detail: New Rx: 1, reason: Patient Preference  Intervention Accepted By:   Gap Closed?:    Time Spent (min): 5

## 2024-03-29 ENCOUNTER — TELEPHONE (OUTPATIENT)
Age: 74
End: 2024-03-29

## 2024-03-29 NOTE — TELEPHONE ENCOUNTER
Patient states Dr. Hewitt informed her she would be increasing the Synthroid. The Rx sent in was for 75mcg/day which is how she has been taking it. She is asking for clarification.      Please advise        For Pharmacy Admin Tracking Only    Program: Medication Refill  CPA in place:    Recommendation Provided To:   Intervention Detail: New Rx: 1, reason: Needs Additional Therapy  Intervention Accepted By:   Gap Closed?:    Time Spent (min): 5

## 2024-04-01 RX ORDER — LEVOTHYROXINE SODIUM 88 UG/1
88 TABLET ORAL DAILY
Qty: 30 TABLET | Refills: 5 | Status: SHIPPED | OUTPATIENT
Start: 2024-04-01

## 2024-04-01 NOTE — TELEPHONE ENCOUNTER
Pt called.  She has been taking levothyroxine 75 mcg daily.  Will increase to 88 mcg daily.  Repeat in 2 months

## 2024-04-17 DIAGNOSIS — I10 ESSENTIAL (PRIMARY) HYPERTENSION: ICD-10-CM

## 2024-04-18 RX ORDER — AMLODIPINE BESYLATE 2.5 MG/1
TABLET ORAL
Qty: 180 TABLET | Refills: 3 | Status: SHIPPED | OUTPATIENT
Start: 2024-04-18

## 2024-04-18 NOTE — TELEPHONE ENCOUNTER
Last appointment: 3/15/24  Next appointment: 9/20/24  Previous refill encounter(s): 7/6/23 #180 with 3 refills    Requested Prescriptions     Pending Prescriptions Disp Refills    amLODIPine (NORVASC) 2.5 MG tablet [Pharmacy Med Name: AMLODIPINE BESYLATE 2.5 MG TAB] 180 tablet 3     Sig: TAKE 2 TABLETS BY MOUTH DAILY FOR HIGH BLOOD PRESSURE.         For Pharmacy Admin Tracking Only    Program: Medication Refill  CPA in place:    Recommendation Provided To:   Intervention Detail: New Rx: 1, reason: Patient Preference  Intervention Accepted By:   Gap Closed?:    Time Spent (min): 5

## 2024-05-19 DIAGNOSIS — F41.9 ANXIETY DISORDER, UNSPECIFIED: ICD-10-CM

## 2024-05-20 RX ORDER — ALPRAZOLAM 1 MG/1
TABLET ORAL
Qty: 60 TABLET | Refills: 1 | Status: SHIPPED | OUTPATIENT
Start: 2024-05-20 | End: 2024-11-16

## 2024-05-20 NOTE — TELEPHONE ENCOUNTER
Last appointment: 3/15/24  Next appointment: 9/20/24  Previous refill encounter(s): 3/21/24 #60 with 1 refill    Requested Prescriptions     Pending Prescriptions Disp Refills    ALPRAZolam (XANAX) 1 MG tablet [Pharmacy Med Name: ALPRAZOLAM 1 MG TABLET] 60 tablet 1     Sig: TAKE 1 TABLET BY MOUTH TWICE A DAY AS NEEDED. MAXIMUM 2 TABLETS PER DAY         For Pharmacy Admin Tracking Only    Program: Medication Refill  CPA in place:    Recommendation Provided To:   Intervention Detail: New Rx: 1, reason: Patient Preference  Intervention Accepted By:   Gap Closed?:    Time Spent (min): 5

## 2024-05-24 DIAGNOSIS — M25.50 PAIN IN UNSPECIFIED JOINT: ICD-10-CM

## 2024-05-24 RX ORDER — TRAMADOL HYDROCHLORIDE 50 MG/1
50 TABLET ORAL EVERY 6 HOURS PRN
Qty: 30 TABLET | Refills: 0 | Status: SHIPPED | OUTPATIENT
Start: 2024-05-24 | End: 2024-06-23

## 2024-05-24 NOTE — TELEPHONE ENCOUNTER
Last appointment: 3/15/24  Next appointment: 9/20/24  Previous refill encounter(s): 3/10/23    Requested Prescriptions     Pending Prescriptions Disp Refills    traMADol (ULTRAM) 50 MG tablet [Pharmacy Med Name: TRAMADOL HCL 50 MG TABLET] 30 tablet 0     Sig: Take 1 tablet by mouth every 6 hours as needed for Pain for up to 30 days. Max Daily Amount: 200 mg         For Pharmacy Admin Tracking Only    Program: Medication Refill  CPA in place:    Recommendation Provided To:   Intervention Detail: New Rx: 1, reason: Patient Preference  Intervention Accepted By:   Gap Closed?:    Time Spent (min): 5

## 2024-06-26 DIAGNOSIS — M19.049 PRIMARY OSTEOARTHRITIS, UNSPECIFIED HAND: ICD-10-CM

## 2024-06-27 NOTE — TELEPHONE ENCOUNTER
Last appointment: 3/15/24  Next appointment: 9/20/24  Previous refill encounter(s): 11/10/23 #100g with 5 refills    Requested Prescriptions     Pending Prescriptions Disp Refills    diclofenac sodium (VOLTAREN) 1 % GEL [Pharmacy Med Name: DICLOFENAC SODIUM 1% GEL] 100 g 5     Sig: APPLY TO AFFECTED AREA FOUR (4) TIMES DAILY AS NEEDED FOR PAIN.         For Pharmacy Admin Tracking Only    Program: Medication Refill  CPA in place:    Recommendation Provided To:   Intervention Detail: New Rx: 1, reason: Patient Preference  Intervention Accepted By:   Gap Closed?:    Time Spent (min): 5

## 2024-07-17 DIAGNOSIS — F41.9 ANXIETY DISORDER, UNSPECIFIED: ICD-10-CM

## 2024-07-17 RX ORDER — ALPRAZOLAM 1 MG/1
TABLET ORAL
Qty: 60 TABLET | Refills: 1 | Status: SHIPPED | OUTPATIENT
Start: 2024-07-17 | End: 2025-01-13

## 2024-07-17 NOTE — TELEPHONE ENCOUNTER
Last appointment: 3/15/24  Next appointment: 9/20/24  Previous refill encounter(s): 5/20/24 #60 with 1 refill    Requested Prescriptions     Pending Prescriptions Disp Refills    ALPRAZolam (XANAX) 1 MG tablet [Pharmacy Med Name: ALPRAZOLAM 1 MG TABLET] 60 tablet 1     Sig: TAKE 1 TABLET BY MOUTH TWICE A DAY AS NEEDED. MAXIMUM 2 TABLETS PER DAY         For Pharmacy Admin Tracking Only    Program: Medication Refill  CPA in place:    Recommendation Provided To:   Intervention Detail: New Rx: 1, reason: Patient Preference  Intervention Accepted By:   Gap Closed?:    Time Spent (min): 5

## 2024-08-21 ENCOUNTER — TELEPHONE (OUTPATIENT)
Age: 74
End: 2024-08-21

## 2024-08-21 DIAGNOSIS — M81.8 OTHER OSTEOPOROSIS WITHOUT CURRENT PATHOLOGICAL FRACTURE: Primary | ICD-10-CM

## 2024-08-21 NOTE — TELEPHONE ENCOUNTER
Pt  left a message that the pt last bone densitometry exam was on 8/19/22 and wants to know does she need another one.  They can be reached at [phone].  In the pt chart it says completed.

## 2024-08-27 RX ORDER — LEVOTHYROXINE SODIUM 88 UG/1
88 TABLET ORAL DAILY
Qty: 90 TABLET | Refills: 1 | Status: SHIPPED | OUTPATIENT
Start: 2024-08-27

## 2024-08-27 NOTE — TELEPHONE ENCOUNTER
Pharmacy is requesting a 90 d/s    Last appointment: 3/15/24  Next appointment: 9/20/24  Previous refill encounter(s): 4/1/24    Requested Prescriptions     Pending Prescriptions Disp Refills    levothyroxine (SYNTHROID) 88 MCG tablet [Pharmacy Med Name: LEVOTHYROXINE 88 MCG TABLET] 90 tablet 1     Sig: TAKE 1 TABLET BY MOUTH EVERY DAY         For Pharmacy Admin Tracking Only    Program: Medication Refill  CPA in place:    Recommendation Provided To:   Intervention Detail: New Rx: 1, reason: Improve Adherence  Intervention Accepted By:   Gap Closed?:    Time Spent (min): 5

## 2024-09-17 DIAGNOSIS — F41.9 ANXIETY DISORDER, UNSPECIFIED: ICD-10-CM

## 2024-09-17 RX ORDER — ALPRAZOLAM 1 MG
TABLET ORAL
Qty: 60 TABLET | Refills: 1 | Status: SHIPPED | OUTPATIENT
Start: 2024-09-17 | End: 2025-03-16

## 2024-09-20 ENCOUNTER — OFFICE VISIT (OUTPATIENT)
Age: 74
End: 2024-09-20
Payer: MEDICARE

## 2024-09-20 ENCOUNTER — ANCILLARY PROCEDURE (OUTPATIENT)
Age: 74
End: 2024-09-20
Payer: MEDICARE

## 2024-09-20 VITALS
WEIGHT: 124.6 LBS | OXYGEN SATURATION: 97 % | HEART RATE: 77 BPM | DIASTOLIC BLOOD PRESSURE: 80 MMHG | RESPIRATION RATE: 16 BRPM | SYSTOLIC BLOOD PRESSURE: 130 MMHG | HEIGHT: 61 IN | BODY MASS INDEX: 23.53 KG/M2 | TEMPERATURE: 97.7 F

## 2024-09-20 DIAGNOSIS — K21.9 GASTRO-ESOPHAGEAL REFLUX DISEASE WITHOUT ESOPHAGITIS: ICD-10-CM

## 2024-09-20 DIAGNOSIS — E03.9 HYPOTHYROIDISM, ACQUIRED: ICD-10-CM

## 2024-09-20 DIAGNOSIS — Z00.00 MEDICARE ANNUAL WELLNESS VISIT, SUBSEQUENT: Primary | ICD-10-CM

## 2024-09-20 DIAGNOSIS — E78.00 HYPERCHOLESTEROLEMIA: ICD-10-CM

## 2024-09-20 DIAGNOSIS — R05.8 ALLERGIC COUGH: ICD-10-CM

## 2024-09-20 DIAGNOSIS — Z79.899 ENCOUNTER FOR LONG-TERM (CURRENT) USE OF MEDICATIONS: ICD-10-CM

## 2024-09-20 DIAGNOSIS — I10 ESSENTIAL (PRIMARY) HYPERTENSION: ICD-10-CM

## 2024-09-20 DIAGNOSIS — F41.9 ANXIETY: ICD-10-CM

## 2024-09-20 LAB
ANION GAP SERPL CALC-SCNC: 2 MMOL/L (ref 2–12)
APPEARANCE UR: ABNORMAL
BACTERIA URNS QL MICRO: NEGATIVE /HPF
BILIRUB UR QL: NEGATIVE
BUN SERPL-MCNC: 22 MG/DL (ref 6–20)
BUN/CREAT SERPL: 28 (ref 12–20)
CALCIUM SERPL-MCNC: 10.3 MG/DL (ref 8.5–10.1)
CHLORIDE SERPL-SCNC: 107 MMOL/L (ref 97–108)
CHOLEST SERPL-MCNC: 215 MG/DL
CO2 SERPL-SCNC: 30 MMOL/L (ref 21–32)
COLOR UR: ABNORMAL
CREAT SERPL-MCNC: 0.78 MG/DL (ref 0.55–1.02)
EPITH CASTS URNS QL MICRO: ABNORMAL /LPF
GLUCOSE SERPL-MCNC: 95 MG/DL (ref 65–100)
GLUCOSE UR STRIP.AUTO-MCNC: NEGATIVE MG/DL
HDLC SERPL-MCNC: 88 MG/DL
HDLC SERPL: 2.4 (ref 0–5)
HGB UR QL STRIP: ABNORMAL
HYALINE CASTS URNS QL MICRO: ABNORMAL /LPF (ref 0–5)
KETONES UR QL STRIP.AUTO: NEGATIVE MG/DL
LDLC SERPL CALC-MCNC: 116.8 MG/DL (ref 0–100)
LEUKOCYTE ESTERASE UR QL STRIP.AUTO: ABNORMAL
NITRITE UR QL STRIP.AUTO: NEGATIVE
PH UR STRIP: 5.5 (ref 5–8)
POTASSIUM SERPL-SCNC: 4.7 MMOL/L (ref 3.5–5.1)
PROT UR STRIP-MCNC: NEGATIVE MG/DL
RBC #/AREA URNS HPF: ABNORMAL /HPF (ref 0–5)
SODIUM SERPL-SCNC: 139 MMOL/L (ref 136–145)
SP GR UR REFRACTOMETRY: 1.02 (ref 1–1.03)
SPECIMEN HOLD: NORMAL
TRIGL SERPL-MCNC: 51 MG/DL
TSH SERPL DL<=0.05 MIU/L-ACNC: 0.24 UIU/ML (ref 0.36–3.74)
UROBILINOGEN UR QL STRIP.AUTO: 0.2 EU/DL (ref 0.2–1)
VLDLC SERPL CALC-MCNC: 10.2 MG/DL
WBC URNS QL MICRO: ABNORMAL /HPF (ref 0–4)

## 2024-09-20 PROCEDURE — 71046 X-RAY EXAM CHEST 2 VIEWS: CPT

## 2024-09-20 PROCEDURE — 99214 OFFICE O/P EST MOD 30 MIN: CPT | Performed by: FAMILY MEDICINE

## 2024-09-20 RX ORDER — MUPIROCIN 20 MG/G
OINTMENT TOPICAL 2 TIMES DAILY PRN
Qty: 15 G | Refills: 0 | Status: SHIPPED | OUTPATIENT
Start: 2024-09-20

## 2024-09-20 RX ORDER — DEXTROMETHORPHAN HYDROBROMIDE AND PROMETHAZINE HYDROCHLORIDE 15; 6.25 MG/5ML; MG/5ML
5 SYRUP ORAL EVERY 6 HOURS PRN
Qty: 180 ML | Refills: 1 | Status: SHIPPED | OUTPATIENT
Start: 2024-09-20

## 2024-09-20 SDOH — ECONOMIC STABILITY: FOOD INSECURITY: WITHIN THE PAST 12 MONTHS, YOU WORRIED THAT YOUR FOOD WOULD RUN OUT BEFORE YOU GOT MONEY TO BUY MORE.: NEVER TRUE

## 2024-09-20 SDOH — ECONOMIC STABILITY: INCOME INSECURITY: HOW HARD IS IT FOR YOU TO PAY FOR THE VERY BASICS LIKE FOOD, HOUSING, MEDICAL CARE, AND HEATING?: NOT HARD AT ALL

## 2024-09-20 SDOH — ECONOMIC STABILITY: FOOD INSECURITY: WITHIN THE PAST 12 MONTHS, THE FOOD YOU BOUGHT JUST DIDN'T LAST AND YOU DIDN'T HAVE MONEY TO GET MORE.: NEVER TRUE

## 2024-09-20 ASSESSMENT — ENCOUNTER SYMPTOMS
COUGH: 0
RHINORRHEA: 0
ABDOMINAL PAIN: 0
CHEST TIGHTNESS: 0
SHORTNESS OF BREATH: 0
BLOOD IN STOOL: 0
CONSTIPATION: 0

## 2024-09-20 ASSESSMENT — PATIENT HEALTH QUESTIONNAIRE - PHQ9
SUM OF ALL RESPONSES TO PHQ9 QUESTIONS 1 & 2: 0
1. LITTLE INTEREST OR PLEASURE IN DOING THINGS: NOT AT ALL
SUM OF ALL RESPONSES TO PHQ QUESTIONS 1-9: 0
SUM OF ALL RESPONSES TO PHQ QUESTIONS 1-9: 0
2. FEELING DOWN, DEPRESSED OR HOPELESS: NOT AT ALL
SUM OF ALL RESPONSES TO PHQ QUESTIONS 1-9: 0
SUM OF ALL RESPONSES TO PHQ QUESTIONS 1-9: 0

## 2024-09-20 ASSESSMENT — LIFESTYLE VARIABLES
HOW OFTEN DO YOU HAVE A DRINK CONTAINING ALCOHOL: NEVER
HOW MANY STANDARD DRINKS CONTAINING ALCOHOL DO YOU HAVE ON A TYPICAL DAY: PATIENT DOES NOT DRINK

## 2024-10-17 DIAGNOSIS — K21.9 GASTRO-ESOPHAGEAL REFLUX DISEASE WITHOUT ESOPHAGITIS: ICD-10-CM

## 2024-10-18 RX ORDER — DEXTROMETHORPHAN HYDROBROMIDE AND PROMETHAZINE HYDROCHLORIDE 15; 6.25 MG/5ML; MG/5ML
SYRUP ORAL
Qty: 180 ML | Refills: 1 | Status: SHIPPED | OUTPATIENT
Start: 2024-10-18

## 2024-10-18 NOTE — TELEPHONE ENCOUNTER
Last appointment: 9/20/24  Next appointment: 3/21/25  Previous refill encounter(s): 12/14/23 Prilosec #90 with 3 refills, 9/20/24 Promethazine #180 with 1 refill    Requested Prescriptions     Pending Prescriptions Disp Refills    omeprazole (PRILOSEC) 20 MG delayed release capsule [Pharmacy Med Name: OMEPRAZOLE DR 20 MG CAPSULE] 90 capsule 3     Sig: TAKE 1 CAPSULE BY MOUTH EVERY DAY    promethazine-dextromethorphan (PROMETHAZINE-DM) 6.25-15 MG/5ML syrup [Pharmacy Med Name: PROMETHAZINE-DM 6.25-15 MG/5ML] 180 mL 1     Sig: TAKE 5 ML BY MOUTH EVERY 6 HOURS AS NEEDED FOR COUGH         For Pharmacy Admin Tracking Only    Program: Medication Refill  CPA in place:    Recommendation Provided To:   Intervention Detail: New Rx: 2, reason: Patient Preference  Intervention Accepted By:   Gap Closed?:    Time Spent (min): 5

## 2024-10-24 RX ORDER — LEVOTHYROXINE SODIUM 88 UG/1
TABLET ORAL
COMMUNITY
Start: 2024-10-24

## 2024-11-15 DIAGNOSIS — F41.9 ANXIETY DISORDER, UNSPECIFIED: ICD-10-CM

## 2024-11-16 ENCOUNTER — TELEPHONE (OUTPATIENT)
Age: 74
End: 2024-11-16

## 2024-11-16 DIAGNOSIS — F41.9 ANXIETY DISORDER, UNSPECIFIED: ICD-10-CM

## 2024-11-16 RX ORDER — ALPRAZOLAM 1 MG/1
TABLET ORAL
Qty: 60 TABLET | Refills: 1 | OUTPATIENT
Start: 2024-11-16

## 2024-11-16 RX ORDER — ALPRAZOLAM 1 MG/1
1 TABLET ORAL 2 TIMES DAILY PRN
Qty: 60 TABLET | Refills: 0 | Status: SHIPPED | OUTPATIENT
Start: 2024-11-16 | End: 2024-12-16

## 2024-11-16 NOTE — TELEPHONE ENCOUNTER
[facility]  [address] [place] [address] [phone]    On Call- Telephone Note      Date: 11/16/24   Time: 3:47 PM    Received a page in regards to Marta Rodríguez. Called the number provided and spoke with pharmacy.    Caller had the following concerns:    Patient needing refill of xanax. Requested yesterday but has not received yet. About to run out    Chart reviewed. Advice given included:    PDMP reviewed will send refill      Informed patient and/or patient's family that they may call the on-call line again if there are any further questions or concerns.       Discussed the expected course, resolution and complications of the diagnosis(es) in detail.  Medication risks, benefits, costs, interactions, and alternatives were discussed as indicated.  Patient to contact the office if their condition worsens, changes or fails to improve. Pt verbalized understanding with the diagnosis(es) and plan.           Nicol Gregg DO    11/16/24   3:47 PM

## 2024-12-04 RX ORDER — LEVOTHYROXINE SODIUM 88 UG/1
TABLET ORAL
Qty: 90 TABLET | Refills: 1 | Status: SHIPPED | OUTPATIENT
Start: 2024-12-04

## 2024-12-04 NOTE — TELEPHONE ENCOUNTER
Rx pended with updated dose as per 10/24/24.    Last appointment: 9/20/24  Next appointment: 3/21/25  Previous refill encounter(s): 8/27/24    Requested Prescriptions     Pending Prescriptions Disp Refills    levothyroxine (SYNTHROID) 88 MCG tablet [Pharmacy Med Name: LEVOTHYROXINE 88 MCG TABLET] 90 tablet 1     Sig: Take 1/2 tab on each Sunday and and 1 tab daily Monday thru Saturday.         For Pharmacy Admin Tracking Only    Program: Medication Refill  CPA in place:    Recommendation Provided To:   Intervention Detail: New Rx: 1, reason: Patient Preference  Intervention Accepted By:   Gap Closed?:    Time Spent (min): 5

## 2024-12-14 DIAGNOSIS — F41.9 ANXIETY DISORDER, UNSPECIFIED: ICD-10-CM

## 2024-12-16 ENCOUNTER — CLINICAL DOCUMENTATION (OUTPATIENT)
Age: 74
End: 2024-12-16

## 2024-12-16 RX ORDER — ALPRAZOLAM 1 MG/1
1 TABLET ORAL 2 TIMES DAILY PRN
Qty: 60 TABLET | Refills: 1 | Status: SHIPPED | OUTPATIENT
Start: 2024-12-16 | End: 2025-03-25

## 2025-01-15 ENCOUNTER — HOSPITAL ENCOUNTER (OUTPATIENT)
Facility: HOSPITAL | Age: 75
Discharge: HOME | End: 2025-01-18
Attending: FAMILY MEDICINE
Payer: MEDICARE

## 2025-01-15 VITALS — BODY MASS INDEX: 23.41 KG/M2 | WEIGHT: 124 LBS | HEIGHT: 61 IN

## 2025-01-15 DIAGNOSIS — Z12.31 VISIT FOR SCREENING MAMMOGRAM: ICD-10-CM

## 2025-01-15 DIAGNOSIS — M81.8 OTHER OSTEOPOROSIS WITHOUT CURRENT PATHOLOGICAL FRACTURE: ICD-10-CM

## 2025-01-15 PROCEDURE — 77063 BREAST TOMOSYNTHESIS BI: CPT

## 2025-01-15 PROCEDURE — 77080 DXA BONE DENSITY AXIAL: CPT

## 2025-02-11 DIAGNOSIS — F41.9 ANXIETY DISORDER, UNSPECIFIED: ICD-10-CM

## 2025-02-11 NOTE — TELEPHONE ENCOUNTER
Last appointment: 9/20/24  Next appointment: 3/21/25  Previous refill encounter(s): 12/16/24 #60 with 1 refill    Requested Prescriptions     Pending Prescriptions Disp Refills    ALPRAZolam (XANAX) 1 MG tablet [Pharmacy Med Name: ALPRAZOLAM 1 MG TABLET] 60 tablet 1     Sig: Take 1 tablet by mouth 2 times daily as needed for Anxiety for up to 60 days. Max Daily Amount: 2 mg         For Pharmacy Admin Tracking Only    Program: Medication Refill  CPA in place:    Recommendation Provided To:   Intervention Detail: New Rx: 1, reason: Patient Preference  Intervention Accepted By:   Gap Closed?:    Time Spent (min): 5

## 2025-02-12 RX ORDER — ALPRAZOLAM 1 MG/1
1 TABLET ORAL 2 TIMES DAILY PRN
Qty: 60 TABLET | Refills: 1 | Status: SHIPPED | OUTPATIENT
Start: 2025-02-12 | End: 2025-04-13

## 2025-02-24 ENCOUNTER — HOSPITAL ENCOUNTER (OUTPATIENT)
Facility: HOSPITAL | Age: 75
Discharge: HOME | End: 2025-02-27
Attending: FAMILY MEDICINE
Payer: MEDICARE

## 2025-02-24 DIAGNOSIS — R92.8 ABNORMAL SCREENING MAMMOGRAM: ICD-10-CM

## 2025-02-24 PROCEDURE — 76642 ULTRASOUND BREAST LIMITED: CPT

## 2025-02-24 PROCEDURE — G0279 TOMOSYNTHESIS, MAMMO: HCPCS

## 2025-03-21 ENCOUNTER — OFFICE VISIT (OUTPATIENT)
Age: 75
End: 2025-03-21
Payer: MEDICARE

## 2025-03-21 VITALS
HEART RATE: 69 BPM | DIASTOLIC BLOOD PRESSURE: 69 MMHG | OXYGEN SATURATION: 98 % | TEMPERATURE: 97.8 F | RESPIRATION RATE: 20 BRPM | WEIGHT: 129.6 LBS | SYSTOLIC BLOOD PRESSURE: 131 MMHG | BODY MASS INDEX: 24.49 KG/M2

## 2025-03-21 DIAGNOSIS — R82.90 NONSPECIFIC FINDINGS ON EXAMINATION OF URINE: ICD-10-CM

## 2025-03-21 DIAGNOSIS — F41.9 ANXIETY: ICD-10-CM

## 2025-03-21 DIAGNOSIS — E03.9 HYPOTHYROIDISM, ACQUIRED: ICD-10-CM

## 2025-03-21 DIAGNOSIS — N39.43 DRIBBLING OF URINE: ICD-10-CM

## 2025-03-21 DIAGNOSIS — Z79.899 ENCOUNTER FOR LONG-TERM (CURRENT) USE OF MEDICATIONS: ICD-10-CM

## 2025-03-21 DIAGNOSIS — E78.00 HYPERCHOLESTEROLEMIA: ICD-10-CM

## 2025-03-21 DIAGNOSIS — K21.9 GASTRO-ESOPHAGEAL REFLUX DISEASE WITHOUT ESOPHAGITIS: ICD-10-CM

## 2025-03-21 DIAGNOSIS — M81.0 AGE-RELATED OSTEOPOROSIS WITHOUT CURRENT PATHOLOGICAL FRACTURE: ICD-10-CM

## 2025-03-21 DIAGNOSIS — I10 ESSENTIAL (PRIMARY) HYPERTENSION: Primary | ICD-10-CM

## 2025-03-21 LAB
25(OH)D3 SERPL-MCNC: 59 NG/ML (ref 30–100)
ALBUMIN SERPL-MCNC: 4.6 G/DL (ref 3.5–5)
ALBUMIN/GLOB SERPL: 1.6 (ref 1.1–2.2)
ALP SERPL-CCNC: 83 U/L (ref 45–117)
ALT SERPL-CCNC: 17 U/L (ref 12–78)
ANION GAP SERPL CALC-SCNC: 5 MMOL/L (ref 2–12)
APPEARANCE UR: CLEAR
AST SERPL-CCNC: 17 U/L (ref 15–37)
BACTERIA URNS QL MICRO: ABNORMAL /HPF
BILIRUB SERPL-MCNC: 0.7 MG/DL (ref 0.2–1)
BILIRUB UR QL: NEGATIVE
BUN SERPL-MCNC: 16 MG/DL (ref 6–20)
BUN/CREAT SERPL: 20 (ref 12–20)
CALCIUM SERPL-MCNC: 10 MG/DL (ref 8.5–10.1)
CHLORIDE SERPL-SCNC: 104 MMOL/L (ref 97–108)
CHOLEST SERPL-MCNC: 204 MG/DL
CO2 SERPL-SCNC: 28 MMOL/L (ref 21–32)
COLOR UR: ABNORMAL
CREAT SERPL-MCNC: 0.81 MG/DL (ref 0.55–1.02)
EPITH CASTS URNS QL MICRO: ABNORMAL /LPF
ERYTHROCYTE [DISTWIDTH] IN BLOOD BY AUTOMATED COUNT: 12.4 % (ref 11.5–14.5)
GLOBULIN SER CALC-MCNC: 2.8 G/DL (ref 2–4)
GLUCOSE SERPL-MCNC: 94 MG/DL (ref 65–100)
GLUCOSE UR STRIP.AUTO-MCNC: NEGATIVE MG/DL
HCT VFR BLD AUTO: 45.1 % (ref 35–47)
HDLC SERPL-MCNC: 84 MG/DL
HDLC SERPL: 2.4 (ref 0–5)
HGB BLD-MCNC: 15 G/DL (ref 11.5–16)
HGB UR QL STRIP: ABNORMAL
HYALINE CASTS URNS QL MICRO: ABNORMAL /LPF (ref 0–5)
KETONES UR QL STRIP.AUTO: NEGATIVE MG/DL
LDLC SERPL CALC-MCNC: 108 MG/DL (ref 0–100)
LEUKOCYTE ESTERASE UR QL STRIP.AUTO: ABNORMAL
MCH RBC QN AUTO: 29.9 PG (ref 26–34)
MCHC RBC AUTO-ENTMCNC: 33.3 G/DL (ref 30–36.5)
MCV RBC AUTO: 89.8 FL (ref 80–99)
NITRITE UR QL STRIP.AUTO: NEGATIVE
NRBC # BLD: 0 K/UL (ref 0–0.01)
NRBC BLD-RTO: 0 PER 100 WBC
PH UR STRIP: 5.5 (ref 5–8)
PLATELET # BLD AUTO: 265 K/UL (ref 150–400)
PMV BLD AUTO: 11 FL (ref 8.9–12.9)
POTASSIUM SERPL-SCNC: 4.2 MMOL/L (ref 3.5–5.1)
PROT SERPL-MCNC: 7.4 G/DL (ref 6.4–8.2)
PROT UR STRIP-MCNC: NEGATIVE MG/DL
RBC # BLD AUTO: 5.02 M/UL (ref 3.8–5.2)
RBC #/AREA URNS HPF: ABNORMAL /HPF (ref 0–5)
SODIUM SERPL-SCNC: 137 MMOL/L (ref 136–145)
SP GR UR REFRACTOMETRY: 1.01 (ref 1–1.03)
SPECIMEN HOLD: NORMAL
TRIGL SERPL-MCNC: 60 MG/DL
TSH SERPL DL<=0.05 MIU/L-ACNC: 0.2 UIU/ML (ref 0.36–3.74)
UROBILINOGEN UR QL STRIP.AUTO: 0.2 EU/DL (ref 0.2–1)
VLDLC SERPL CALC-MCNC: 12 MG/DL
WBC # BLD AUTO: 5.8 K/UL (ref 3.6–11)
WBC URNS QL MICRO: >100 /HPF (ref 0–4)

## 2025-03-21 PROCEDURE — G2211 COMPLEX E/M VISIT ADD ON: HCPCS | Performed by: FAMILY MEDICINE

## 2025-03-21 PROCEDURE — 3078F DIAST BP <80 MM HG: CPT | Performed by: FAMILY MEDICINE

## 2025-03-21 PROCEDURE — 1036F TOBACCO NON-USER: CPT | Performed by: FAMILY MEDICINE

## 2025-03-21 PROCEDURE — 1123F ACP DISCUSS/DSCN MKR DOCD: CPT | Performed by: FAMILY MEDICINE

## 2025-03-21 PROCEDURE — 1126F AMNT PAIN NOTED NONE PRSNT: CPT | Performed by: FAMILY MEDICINE

## 2025-03-21 PROCEDURE — 1159F MED LIST DOCD IN RCRD: CPT | Performed by: FAMILY MEDICINE

## 2025-03-21 PROCEDURE — 1090F PRES/ABSN URINE INCON ASSESS: CPT | Performed by: FAMILY MEDICINE

## 2025-03-21 PROCEDURE — G8427 DOCREV CUR MEDS BY ELIG CLIN: HCPCS | Performed by: FAMILY MEDICINE

## 2025-03-21 PROCEDURE — 0509F URINE INCON PLAN DOCD: CPT | Performed by: FAMILY MEDICINE

## 2025-03-21 PROCEDURE — 3017F COLORECTAL CA SCREEN DOC REV: CPT | Performed by: FAMILY MEDICINE

## 2025-03-21 PROCEDURE — G8399 PT W/DXA RESULTS DOCUMENT: HCPCS | Performed by: FAMILY MEDICINE

## 2025-03-21 PROCEDURE — 99214 OFFICE O/P EST MOD 30 MIN: CPT | Performed by: FAMILY MEDICINE

## 2025-03-21 PROCEDURE — 3075F SYST BP GE 130 - 139MM HG: CPT | Performed by: FAMILY MEDICINE

## 2025-03-21 PROCEDURE — 1160F RVW MEDS BY RX/DR IN RCRD: CPT | Performed by: FAMILY MEDICINE

## 2025-03-21 PROCEDURE — G8420 CALC BMI NORM PARAMETERS: HCPCS | Performed by: FAMILY MEDICINE

## 2025-03-21 RX ORDER — IBANDRONATE SODIUM 150 MG/1
150 TABLET, FILM COATED ORAL
Qty: 3 TABLET | Refills: 3 | Status: SHIPPED | OUTPATIENT
Start: 2025-03-21

## 2025-03-21 SDOH — ECONOMIC STABILITY: FOOD INSECURITY: WITHIN THE PAST 12 MONTHS, YOU WORRIED THAT YOUR FOOD WOULD RUN OUT BEFORE YOU GOT MONEY TO BUY MORE.: NEVER TRUE

## 2025-03-21 SDOH — ECONOMIC STABILITY: FOOD INSECURITY: WITHIN THE PAST 12 MONTHS, THE FOOD YOU BOUGHT JUST DIDN'T LAST AND YOU DIDN'T HAVE MONEY TO GET MORE.: NEVER TRUE

## 2025-03-21 ASSESSMENT — PATIENT HEALTH QUESTIONNAIRE - PHQ9
SUM OF ALL RESPONSES TO PHQ QUESTIONS 1-9: 0
1. LITTLE INTEREST OR PLEASURE IN DOING THINGS: NOT AT ALL
SUM OF ALL RESPONSES TO PHQ QUESTIONS 1-9: 0
SUM OF ALL RESPONSES TO PHQ QUESTIONS 1-9: 0
2. FEELING DOWN, DEPRESSED OR HOPELESS: NOT AT ALL
SUM OF ALL RESPONSES TO PHQ QUESTIONS 1-9: 0

## 2025-03-21 ASSESSMENT — ENCOUNTER SYMPTOMS
SHORTNESS OF BREATH: 0
CHEST TIGHTNESS: 0
ABDOMINAL PAIN: 0
CONSTIPATION: 0
RHINORRHEA: 0
BLOOD IN STOOL: 0
COUGH: 0

## 2025-03-21 NOTE — PROGRESS NOTES
Chief Complaint   Patient presents with    6 Month Follow-Up     Patient is here today for a 6 month follow up.       \"Have you been to the ER, urgent care clinic since your last visit?  Hospitalized since your last visit?\"    NO    “Have you seen or consulted any other health care providers outside of [facility] since your last visit?”    NO            Click Here for Release of Records Request       There were no vitals filed for this visit.   Health Maintenance Due   Topic Date Due    Respiratory Syncytial Virus (RSV) Pregnant or age 60 yrs+ (1 - 1-dose 75+ series) Never done        The patient, Marta Arreguin, identity was verified by name and .     
sleep disturbance. The patient is not nervous/anxious.        Objective:   Physical Exam  Constitutional:       Appearance: Normal appearance.      Comments: /69   Pulse 69   Temp 97.8 °F (36.6 °C) (Temporal)   Resp 20   Wt 58.8 kg (129 lb 9.6 oz)   SpO2 98%   BMI 24.49 kg/m²    HENT:      Right Ear: Tympanic membrane normal.      Left Ear: Tympanic membrane normal.      Nose: No rhinorrhea.      Mouth/Throat:      Mouth: Mucous membranes are moist.   Cardiovascular:      Rate and Rhythm: Normal rate and regular rhythm.   Pulmonary:      Effort: Pulmonary effort is normal.      Breath sounds: Normal breath sounds.   Abdominal:      General: Bowel sounds are normal.      Palpations: Abdomen is soft.      Tenderness: There is no abdominal tenderness.   Musculoskeletal:         General: Normal range of motion.      Cervical back: Normal range of motion and neck supple.      Right lower leg: No edema.      Left lower leg: No edema.   Lymphadenopathy:      Cervical: No cervical adenopathy.   Skin:     General: Skin is warm and dry.   Neurological:      General: No focal deficit present.      Mental Status: She is alert and oriented to person, place, and time.   Psychiatric:         Mood and Affect: Mood normal.           Assessment and Plan:   ASSESSMENT and PLAN  Marta was seen today for 6 month follow-up.    Diagnoses and all orders for this visit:    Essential (primary) hypertension  Chronic, stable   Discussed sodium restriction, high k rich diet, maintaining ideal body weight and regular exercise program such as daily walking 30 min perday 4-5 times per week, as physiologic means to achieve blood pressure control.  Medication compliance advised.     Hypercholesterolemia  Continue to monitor.  Work on diet and exercise.  -     Lipid Panel; Future    Hypothyroidism, acquired  -     TSH; Future    Anxiety  Stable with prn xanax    Encounter for long-term (current) use of medications  -     Comprehensive

## 2025-03-23 LAB
BACTERIA SPEC CULT: ABNORMAL
CC UR VC: ABNORMAL
SERVICE CMNT-IMP: ABNORMAL

## 2025-03-25 ENCOUNTER — RESULTS FOLLOW-UP (OUTPATIENT)
Age: 75
End: 2025-03-25

## 2025-03-25 RX ORDER — SULFAMETHOXAZOLE AND TRIMETHOPRIM 800; 160 MG/1; MG/1
1 TABLET ORAL 2 TIMES DAILY
Qty: 10 TABLET | Refills: 0 | Status: SHIPPED | OUTPATIENT
Start: 2025-03-25 | End: 2025-03-30

## 2025-03-25 RX ORDER — LEVOTHYROXINE SODIUM 88 UG/1
TABLET ORAL
Qty: 90 TABLET | Refills: 1
Start: 2025-03-25 | End: 2025-03-25 | Stop reason: SDUPTHER

## 2025-03-25 RX ORDER — LEVOTHYROXINE SODIUM 88 UG/1
TABLET ORAL
Qty: 90 TABLET | Refills: 3 | Status: SHIPPED | OUTPATIENT
Start: 2025-03-25

## 2025-04-10 ENCOUNTER — TELEPHONE (OUTPATIENT)
Age: 75
End: 2025-04-10

## 2025-04-10 RX ORDER — LEVOTHYROXINE SODIUM 88 UG/1
TABLET ORAL
Qty: 90 TABLET | Refills: 3 | Status: SHIPPED | OUTPATIENT
Start: 2025-04-10

## 2025-04-10 NOTE — TELEPHONE ENCOUNTER
[id number] [place] [address] - [phone] - F [phone]   Is wanting to get clarification on the directions for the pt's levothyroxine (SYNTHROID) 88 MCG tablet   ------------------------------------------------------------------  Sig on the bottle reads:  Take 1/2 tab on each Sunday and Sunday and 1 tab daily Monday thru Friday.     Change to 1/2 tab on each Saturday and Sunday?

## 2025-04-10 NOTE — TELEPHONE ENCOUNTER
[id number] - [place], [place] - [address] - [phone] - F [phone]   Is wanting to get clarification on the directions for the pt's levothyroxine (SYNTHROID) 88 MCG tablet

## 2025-04-14 DIAGNOSIS — F41.9 ANXIETY DISORDER, UNSPECIFIED: ICD-10-CM

## 2025-04-15 DIAGNOSIS — F41.9 ANXIETY: Primary | ICD-10-CM

## 2025-04-15 RX ORDER — ALPRAZOLAM 1 MG/1
TABLET ORAL
Qty: 60 TABLET | Refills: 1 | Status: SHIPPED | OUTPATIENT
Start: 2025-04-15 | End: 2025-05-15

## 2025-04-15 RX ORDER — LEVOTHYROXINE SODIUM 88 UG/1
TABLET ORAL
Qty: 90 TABLET | Refills: 3 | Status: SHIPPED | OUTPATIENT
Start: 2025-04-15

## 2025-04-15 NOTE — TELEPHONE ENCOUNTER
[phone]    Patient is requesting a Rx refill of Alprazolam 1 mg.    Please send to [address]  [phone].

## 2025-04-16 RX ORDER — ALPRAZOLAM 1 MG/1
1 TABLET ORAL 2 TIMES DAILY PRN
Qty: 60 TABLET | Refills: 1 | OUTPATIENT
Start: 2025-04-16 | End: 2025-06-15

## 2025-05-19 NOTE — PROGRESS NOTES
HISTORY OF PRESENT ILLNESS Km Diaz is a 71 y.o. female. HPI Follow up on chronic medical problems. C/o increase gas and passing flatus. Has tried OTC medications which has not helped. At times feels bloated. Sometimes has abd pain and cramping. No abd pain. BMs are normal but occasionally loose. No particular diet trigger. Hypercholesterolemia follow up: 
Compliant w/ low fat, low cholesterol diet. Exercising some. Patient fasting today. Thyroid Review: 
Patient is seen for followup of hypothyroidism. Thyroid ROS: denies fatigue, heat/cold intolerance, bowel/skin changes or CVS symptoms. Weight is up from last visit. HM: 
Mammogram: 5/14/2018 Colonoscopy 3/12/2015 by Dr. Analy Jenkins in 5 yrs Encounter for pain management. 1./2. Medical history/Past medical History Chronic Pain: 
Has osteoarthritis in multple joints. Pain is worse in the thumb. C/o pain in the right right thumb from arthritits. Pain in the thumb in particular is severe at times. Using the tramadol for severe pain. Taking NSAIDs upsets her stomach. 3. Applicable records from prior treatment providers are apart of [facility] under the media tab. 4. Diagnostic, therapeutic and laboratory results are available in the [facility] chart. 5. Consultation notes are available for review in the media tab of the [facility] chart. 6. Treatment goals include pain control so that the pt may be as active and function with her daily activities and improved comfort level. Previously pt has been limited by pain. 7. The risks and benefits of treatment has been discussed at this office visit with the pt. She understands that the medication has addicting potential.  Additionally the pt has been advised that narcotic pain medication may impair mental and/or physical ability required for performance of tasks such as driving or operating any other machinery. Thanks!   8. Pt has an updated signed pain contract on file and can be found under the FYI section of the [facility] chart. 9. The pain contract is reviewed. Pain medication will be continued at the previous dosage. Urine drug screening will be done today. Diagnostic studies are not indicated at this time. Interventional procedure are not indicated at this time. 10. Medication prescibed is traMADol (ULTRAM) 50 mg tablet.  has been reviewed at this OV by me. 11. Patient instructions have been reviewed in detail as outlined above and in the pain contract. 12. Re-eval is planned for 3 months. Patient Active Problem List  
Diagnosis Code  Menopause Z78.0  Hypothyroidism E03.9  Anxiety F41.9  GERD (gastroesophageal reflux disease) K21.9  Lung nodule R91.1  Degenerative arthritis of thumb M18.10 Current Outpatient Medications Medication Sig Dispense Refill  ALPRAZolam (XANAX) 1 mg tablet TAKE 1 TABLET BY MOUTH TWICE A DAY AS NEEDED MAX 2 TABS PER DAY 60 Tab 1  
 omeprazole (PRILOSEC) 20 mg capsule TAKE ONE CAPSULE BY MOUTH ONE TIME DAILY 90 Cap 3  
 traMADol (ULTRAM) 50 mg tablet Take 1 tablet by mouth every 6 hours as needed for pain. (Max 4 tablets per day) 30 Tab 0  
 levothyroxine (SYNTHROID) 75 mcg tablet Take 1 Tab by mouth Daily (before breakfast). 90 Tab 3  
 black cohosh 40 mg tab Take 1 Tab by mouth daily.  calcium citrate/vitamin D3 (CITRACAL + D PO) Take 1 Tab by mouth two (2) times a day.  multivit-min/iron/folic/lutein (CENTRUM SILVER WOMEN PO) Take 1 Tab by mouth daily.  loratadine-pseudoephedrine (CLARITIN-D 12 HOUR) 5-120 mg per tablet Take 1 Tab by mouth two (2) times daily as needed.  Vit A,C,E-Zinc-Copper (ICAPS AREDS) cap capsule Take 1 Cap by mouth.  Cetirizine (ZYRTEC) 10 mg cap Take 1 Tab by mouth daily as needed.     
 fluticasone (FLONASE) 50 mcg/actuation nasal spray USE TWO SPRAYS IN EACH NOSTRIL DAILY  16 g 10  
 
 
 Allergies Allergen Reactions  Codeine Nausea and Vomiting  Mepivacaine Other (comments) Mepivacaine 3% Facial swelling  Novocain [Procaine] Swelling Facial swelling Past Medical History:  
Diagnosis Date  Anxiety 4/15/2010  Environmental allergies  GERD (gastroesophageal reflux disease)  Hypothyroidism 4/15/2010  Lung nodule  Menopause 4/15/2010  Pneumonia  Thyroid disease Past Surgical History:  
Procedure Laterality Date  HX APPEN[address]CTOMY [address]  [place] COLONOSCOPY FLX DX W/COLLJ SPEC WHEN PFRMD  2010  
 [facility] Family History Problem Relation Age of Onset  Lung Disease Mother   
     emphysema  Hypertension Mother  Cancer Father   
     colon  Alzheimer Brother Social History Tobacco Use  Smoking status: Former Smoker Last attempt to quit: 2000 Years since quittin.3  Smokeless tobacco: Never Used Substance Use Topics  Alcohol use: Yes Alcohol/week: 0.0 oz  
  Comment: occasional  
 
 
  
Lab Results Component Value Date/Time WBC 5.1 2018 10:15 AM  
 HGB 14.4 2018 10:15 AM  
 HCT 42.4 2018 10:15 AM  
 PLATELET 793  10:15 AM  
 MCV 87 2018 10:15 AM  
 
Lab Results Component Value Date/Time Cholesterol, total 216 (H) 2018 10:15 AM  
 HDL Cholesterol 92 2018 10:15 AM  
 LDL, calculated 112 (H) 2018 10:15 AM  
 Triglyceride 58 2018 10:15 AM  
 CHOL/HDL Ratio 2.3 2009 04:08 PM  
 
Lab Results Component Value Date/Time TSH 2.510 2018 08:54 AM  
 T4, Free 1.56 2015 08:13 AM  
  
Lab Results Component Value Date/Time  Sodium 142 2018 10:15 AM  
 Potassium 5.3 (H) 2018 10:15 AM  
 Chloride 102 2018 10:15 AM  
 CO2 24 2018 10:15 AM  
 Anion gap 6 10/07/2012 11:30 AM  
 Glucose 81 2018 10:15 AM  
 BUN 16 2018 10:15 AM  
 Creatinine 0.70 05/14/2018 10:15 AM  
 BUN/Creatinine ratio 23 05/14/2018 10:15 AM  
 GFR est  05/14/2018 10:15 AM  
 GFR est non-AA 89 05/14/2018 10:15 AM  
 Calcium 10.1 05/14/2018 10:15 AM  
 Bilirubin, total 0.6 05/14/2018 10:15 AM  
 ALT (SGPT) 18 05/14/2018 10:15 AM  
 AST (SGOT) 24 05/14/2018 10:15 AM  
 Alk. phosphatase 71 05/14/2018 10:15 AM  
 Protein, total 7.0 05/14/2018 10:15 AM  
 Albumin 4.9 (H) 05/14/2018 10:15 AM  
 Globulin 3.7 10/07/2012 11:30 AM  
 A-G Ratio 2.3 (H) 05/14/2018 10:15 AM  
  
Lab Results Component Value Date/Time Hemoglobin A1c 5.3 04/20/2012 10:34 AM  
 Hemoglobin A1c (POC) 4.9 05/14/2018 10:15 AM  
   
 
Review of Systems Constitutional: Negative for malaise/fatigue. HENT: Negative for congestion. Eyes: Negative for blurred vision. Respiratory: Negative for cough and shortness of breath. Cardiovascular: Negative for chest pain, palpitations and leg swelling. Gastrointestinal: Negative for abdominal pain, constipation and heartburn. Genitourinary: Negative for dysuria, frequency and urgency. Musculoskeletal: Positive for back pain and joint pain. Neurological: Negative for dizziness, tingling and headaches. Endo/Heme/Allergies: Negative for environmental allergies. Psychiatric/Behavioral: Negative for depression. The patient does not have insomnia. Physical Exam  
Constitutional: She appears well-developed and well-nourished. /78   Pulse 76   Temp 96.4 °F (35.8 °C) (Oral)   Resp 16   Ht 5' 1\" (1.549 m)   Wt 115 lb 3.2 oz (52.3 kg)   LMP 01/01/1983   SpO2 99%   BMI 21.77 kg/m² HENT:  
Right Ear: Tympanic membrane and ear canal normal.  
Left Ear: Tympanic membrane and ear canal normal.  
Nose: No mucosal edema or rhinorrhea. Mouth/Throat: Oropharynx is clear and moist and mucous membranes are normal.  
Neck: Normal range of motion. Neck supple. No thyromegaly present. Cardiovascular: Normal rate, regular rhythm and normal heart sounds. Exam reveals no gallop. Pulmonary/Chest: Effort normal and breath sounds normal.  
Abdominal: Soft. Normal appearance and bowel sounds are normal. She exhibits no mass. There is no tenderness. Musculoskeletal: Normal range of motion. She exhibits no edema. Lumbar back: She exhibits tenderness and bony tenderness. She exhibits normal range of motion. Has right thumb tenderness at the MCP. Lymphadenopathy:  
  She has no cervical adenopathy. Skin: Skin is warm and dry. Psychiatric: She has a normal mood and affect. Nursing note and vitals reviewed. ASSESSMENT and PLAN Diagnoses and all orders for this visit: 
 
1. Hypothyroidism due to acquired atrophy of thyroid 
-     TSH 3RD GENERATION 2. Arthralgia of multiple joints// 
3. Osteoarthritis of right thumb -     Refill traMADol (ULTRAM) 50 mg tablet; Take 1 Tab by mouth every six (6) hours as needed for Pain for up to 99 days. Max Daily Amount: 200 mg. Take 1 tablet by mouth every 6 hours as needed for pain. (Max 4 tablets per day) 4. Encounter for pain management 
-     9-DRUG SCREEN + OXY, UR 
-     COMPLIANCE DRUG SCREEN/PRESCRIPTION MONITORING The pt has signed medication agreement. Pain contract is reviewed. Pain medications will be continued at the previous dosage. Urine drug screening will be done today. Diagnostic  studies are not indicated at this time. Interventional procedure are not indicated at this time. Re-eval in 3 months. 5. Encounter for medication monitoring -     METABOLIC PANEL, COMPREHENSIVE 
-     CBC W/O DIFF 6. Passing gas// 
7. Abdominal bloating Reviewed info on diet, increase fiber. Follow-up and Dispositions · Return in about 5 months (around 9/23/2019) for medicare wellness exam. 
  
 
reviewed diet, exercise and weight control 
cardiovascular risk and specific lipid/LDL goals reviewed reviewed medications and side effects in detail I have discussed diagnosis listed in this note with pt and/or family. I have discussed treatment plans and options and the risk/benefit analysis of those options, including safe use of medications and possible medication side effects. Through the use of shared decision making we have agreed to the above plan. The patient has received an after-visit summary and questions were answered concerning future plans and follow up. Advise pt of any urgent changes then to proceed to the ER.

## 2025-05-29 DIAGNOSIS — I10 ESSENTIAL (PRIMARY) HYPERTENSION: ICD-10-CM

## 2025-05-29 RX ORDER — AMLODIPINE BESYLATE 2.5 MG/1
TABLET ORAL
Qty: 180 TABLET | Refills: 3 | Status: SHIPPED | OUTPATIENT
Start: 2025-05-29

## 2025-05-29 NOTE — TELEPHONE ENCOUNTER
Last appointment: 3/21/25  Next appointment: 9/26/25  Previous refill encounter(s): 4/18/24    Requested Prescriptions     Pending Prescriptions Disp Refills    amLODIPine (NORVASC) 2.5 MG tablet [Pharmacy Med Name: AMLODIPINE BESYLATE 2.5 MG TAB] 180 tablet 3     Sig: TAKE 2 TABLETS BY MOUTH DAILY FOR HIGH BLOOD PRESSURE.         For Pharmacy Admin Tracking Only    Program: Medication Refill  CPA in place:    Recommendation Provided To:   Intervention Detail: New Rx: 1, reason: Patient Preference  Intervention Accepted By:   Gap Closed?:    Time Spent (min): 5   WDL

## 2025-06-13 DIAGNOSIS — F41.9 ANXIETY: ICD-10-CM

## 2025-06-13 RX ORDER — ALPRAZOLAM 1 MG/1
1 TABLET ORAL 2 TIMES DAILY PRN
Qty: 60 TABLET | Refills: 0 | Status: SHIPPED | OUTPATIENT
Start: 2025-06-13 | End: 2025-07-13

## 2025-06-13 NOTE — TELEPHONE ENCOUNTER
Last appointment: 3/21/25  Next appointment: 9/26/25  Previous refill encounter(s): 4/15/25 #60 with 1 refill    Requested Prescriptions     Pending Prescriptions Disp Refills    ALPRAZolam (XANAX) 1 MG tablet [Pharmacy Med Name: ALPRAZOLAM 1 MG TABLET] 60 tablet 0     Sig: Take 1 tablet by mouth 2 times daily as needed for Anxiety for up to 30 days. Max Daily Amount: 2 mg         For Pharmacy Admin Tracking Only    Program: Medication Refill  CPA in place:    Recommendation Provided To:   Intervention Detail: New Rx: 1, reason: Patient Preference  Intervention Accepted By:   Gap Closed?:    Time Spent (min): 5

## 2025-06-27 DIAGNOSIS — F41.9 ANXIETY: ICD-10-CM

## 2025-06-27 RX ORDER — ALPRAZOLAM 1 MG/1
1 TABLET ORAL 2 TIMES DAILY PRN
Qty: 60 TABLET | Refills: 0 | OUTPATIENT
Start: 2025-06-27 | End: 2025-07-27

## 2025-07-09 DIAGNOSIS — K21.9 GASTRO-ESOPHAGEAL REFLUX DISEASE WITHOUT ESOPHAGITIS: ICD-10-CM

## 2025-07-09 DIAGNOSIS — M19.049 PRIMARY OSTEOARTHRITIS, UNSPECIFIED HAND: ICD-10-CM

## 2025-07-09 RX ORDER — OMEPRAZOLE 20 MG/1
CAPSULE, DELAYED RELEASE ORAL DAILY
Qty: 90 CAPSULE | Refills: 3 | Status: SHIPPED | OUTPATIENT
Start: 2025-07-09

## 2025-07-09 NOTE — TELEPHONE ENCOUNTER
I show Prilosec was d/c on 3/21/25. Please sign if appropriate.    Last appointment: 3/21/25  Next appointment: 9/26/25  Previous refill encounter(s): 6/27/24 Voltaren, 10/18/24 Prilosec    Requested Prescriptions     Pending Prescriptions Disp Refills    diclofenac sodium (VOLTAREN) 1 % GEL [Pharmacy Med Name: DICLOFENAC SODIUM 1% GEL] 100 g 5     Sig: APPLY TO AFFECTED AREA FOUR (4) TIMES DAILY AS NEEDED FOR PAIN.    omeprazole (PRILOSEC) 20 MG delayed release capsule [Pharmacy Med Name: OMEPRAZOLE DR 20 MG CAPSULE] 90 capsule 3     Sig: TAKE 1 CAPSULE BY MOUTH EVERY DAY         For Pharmacy Admin Tracking Only    Program: Medication Refill  CPA in place:    Recommendation Provided To:   Intervention Detail: New Rx: 2, reason: Patient Preference  Intervention Accepted By:   Gap Closed?:    Time Spent (min): 5

## 2025-07-15 DIAGNOSIS — F41.9 ANXIETY: Primary | ICD-10-CM

## 2025-07-15 RX ORDER — ALPRAZOLAM 1 MG/1
TABLET ORAL
Qty: 60 TABLET | Refills: 1 | Status: SHIPPED | OUTPATIENT
Start: 2025-07-15 | End: 2025-08-15

## 2025-07-15 NOTE — TELEPHONE ENCOUNTER
Patient's  Cesar, calling for status of refill request and wants   A call back to the patient to explain why she has not gotten her refill. [phone].

## 2025-07-15 NOTE — TELEPHONE ENCOUNTER
Patient's  Cesar, (EC) requesting a refill on Alprazolam 1 mg sent to [facility] [phone]. Cesar can  be reached at [phone].

## (undated) DEVICE — 1200 GUARD II KIT W/5MM TUBE W/O VAC TUBE: Brand: GUARDIAN

## (undated) DEVICE — SOLIDIFIER FLD 2OZ 1500CC N DISINF IN BTL DISP SAFESORB

## (undated) DEVICE — Device

## (undated) DEVICE — NEONATAL-ADULT SPO2 SENSOR: Brand: NELLCOR

## (undated) DEVICE — SYR 3ML LL TIP 1/10ML GRAD --

## (undated) DEVICE — ELECTRODE,RADIOTRANSLUCENT,FOAM,5PK: Brand: MEDLINE

## (undated) DEVICE — SET ADMIN 16ML TBNG L100IN 2 Y INJ SITE IV PIGGY BK DISP

## (undated) DEVICE — BASIN EMSIS 16OZ GRAPHITE PLAS KID SHP MOLD GRAD FOR ORAL

## (undated) DEVICE — TOWEL 4 PLY TISS 19X30 SUE WHT

## (undated) DEVICE — NEEDLE HYPO 18GA L1.5IN PNK S STL HUB POLYPR SHLD REG BVL

## (undated) DEVICE — SYR 10ML LUER LOK 1/5ML GRAD --

## (undated) DEVICE — Z DISCONTINUED PER MEDLINE LINE GAS SAMPLING O2/CO2 LNG AD 13 FT NSL W/ TBNG FILTERLINE

## (undated) DEVICE — CATH IV AUTOGRD BC PNK 20GA 25 -- INSYTE